# Patient Record
Sex: MALE | Race: WHITE | NOT HISPANIC OR LATINO | Employment: OTHER | URBAN - NONMETROPOLITAN AREA
[De-identification: names, ages, dates, MRNs, and addresses within clinical notes are randomized per-mention and may not be internally consistent; named-entity substitution may affect disease eponyms.]

---

## 2017-01-18 ENCOUNTER — CLINICAL SUPPORT (OUTPATIENT)
Dept: INTERNAL MEDICINE | Facility: CLINIC | Age: 57
End: 2017-01-18

## 2017-01-18 DIAGNOSIS — E29.1 HYPOGONADISM IN MALE: ICD-10-CM

## 2017-01-18 PROCEDURE — 96372 THER/PROPH/DIAG INJ SC/IM: CPT | Performed by: FAMILY MEDICINE

## 2017-01-18 RX ADMIN — TESTOSTERONE CYPIONATE 300 MG: 200 INJECTION, SOLUTION INTRAMUSCULAR at 11:53

## 2017-02-02 ENCOUNTER — APPOINTMENT (OUTPATIENT)
Dept: CT IMAGING | Facility: HOSPITAL | Age: 57
End: 2017-02-02

## 2017-02-02 ENCOUNTER — HOSPITAL ENCOUNTER (OUTPATIENT)
Facility: HOSPITAL | Age: 57
Setting detail: OBSERVATION
Discharge: HOME | End: 2017-02-03
Attending: INTERNAL MEDICINE | Admitting: INTERNAL MEDICINE

## 2017-02-02 ENCOUNTER — APPOINTMENT (OUTPATIENT)
Dept: GENERAL RADIOLOGY | Facility: HOSPITAL | Age: 57
End: 2017-02-02

## 2017-02-02 ENCOUNTER — OFFICE VISIT (OUTPATIENT)
Dept: INTERNAL MEDICINE | Facility: CLINIC | Age: 57
End: 2017-02-02

## 2017-02-02 VITALS
SYSTOLIC BLOOD PRESSURE: 124 MMHG | TEMPERATURE: 98.4 F | HEIGHT: 70 IN | BODY MASS INDEX: 32.78 KG/M2 | HEART RATE: 87 BPM | OXYGEN SATURATION: 89 % | DIASTOLIC BLOOD PRESSURE: 80 MMHG | RESPIRATION RATE: 12 BRPM | WEIGHT: 229 LBS

## 2017-02-02 DIAGNOSIS — R09.02 HYPOXIA: ICD-10-CM

## 2017-02-02 DIAGNOSIS — R06.03 RESPIRATORY DISTRESS: Primary | ICD-10-CM

## 2017-02-02 DIAGNOSIS — J20.8 ACUTE BRONCHITIS DUE TO OTHER SPECIFIED ORGANISMS: ICD-10-CM

## 2017-02-02 DIAGNOSIS — R06.02 SHORTNESS OF BREATH: ICD-10-CM

## 2017-02-02 DIAGNOSIS — J41.1 BRONCHITIS, MUCOPURULENT RECURRENT (HCC): ICD-10-CM

## 2017-02-02 PROBLEM — R06.00 DYSPNEA: Status: ACTIVE | Noted: 2017-02-02

## 2017-02-02 PROBLEM — I16.0 HYPERTENSIVE URGENCY: Status: ACTIVE | Noted: 2017-02-02

## 2017-02-02 PROBLEM — R51.9 HEADACHE: Status: ACTIVE | Noted: 2017-02-02

## 2017-02-02 LAB
ALBUMIN SERPL-MCNC: 4.4 G/DL (ref 3.5–5)
ALBUMIN/GLOB SERPL: 1.4 G/DL (ref 1–2)
ALP SERPL-CCNC: 69 U/L (ref 38–126)
ALT SERPL W P-5'-P-CCNC: 54 U/L (ref 13–69)
ANION GAP SERPL CALCULATED.3IONS-SCNC: 18.8 MMOL/L
ARTERIAL PATENCY WRIST A: POSITIVE
AST SERPL-CCNC: 28 U/L (ref 15–46)
ATMOSPHERIC PRESS: 741 MMHG
BASE EXCESS BLDA CALC-SCNC: 1.7 MMOL/L
BASOPHILS # BLD AUTO: 0.04 10*3/MM3 (ref 0–0.2)
BASOPHILS NFR BLD AUTO: 0.5 % (ref 0–2.5)
BDY SITE: ABNORMAL
BILIRUB SERPL-MCNC: 0.6 MG/DL (ref 0.2–1.3)
BUN BLD-MCNC: 19 MG/DL (ref 7–20)
BUN/CREAT SERPL: 27.1 (ref 6.3–21.9)
CALCIUM SPEC-SCNC: 9.4 MG/DL (ref 8.4–10.2)
CHLORIDE SERPL-SCNC: 103 MMOL/L (ref 98–107)
CO2 SERPL-SCNC: 25 MMOL/L (ref 26–30)
CREAT BLD-MCNC: 0.7 MG/DL (ref 0.6–1.3)
D-LACTATE SERPL-SCNC: 1.3 MMOL/L (ref 0.5–2)
DEPRECATED RDW RBC AUTO: 47.3 FL (ref 37–54)
EOSINOPHIL # BLD AUTO: 0.37 10*3/MM3 (ref 0–0.7)
EOSINOPHIL NFR BLD AUTO: 4.8 % (ref 0–7)
ERYTHROCYTE [DISTWIDTH] IN BLOOD BY AUTOMATED COUNT: 14 % (ref 11.5–14.5)
GFR SERPL CREATININE-BSD FRML MDRD: 118 ML/MIN/1.73
GLOBULIN UR ELPH-MCNC: 3.2 GM/DL
GLUCOSE BLD-MCNC: 185 MG/DL (ref 74–98)
HCO3 BLDA-SCNC: 25.9 MMOL/L (ref 22–28)
HCT VFR BLD AUTO: 51.7 % (ref 42–52)
HGB BLD-MCNC: 17.7 G/DL (ref 14–18)
HGB BLDA-MCNC: 18.1 G/DL (ref 12–18)
HOROWITZ INDEX BLD+IHG-RTO: 28 %
IMM GRANULOCYTES # BLD: 0.02 10*3/MM3 (ref 0–0.06)
IMM GRANULOCYTES NFR BLD: 0.3 % (ref 0–0.6)
LYMPHOCYTES # BLD AUTO: 1.55 10*3/MM3 (ref 0.6–3.4)
LYMPHOCYTES NFR BLD AUTO: 20 % (ref 10–50)
MCH RBC QN AUTO: 31.7 PG (ref 27–31)
MCHC RBC AUTO-ENTMCNC: 34.2 G/DL (ref 30–37)
MCV RBC AUTO: 92.7 FL (ref 80–94)
MODALITY: ABNORMAL
MONOCYTES # BLD AUTO: 0.91 10*3/MM3 (ref 0–0.9)
MONOCYTES NFR BLD AUTO: 11.8 % (ref 0–12)
NEUTROPHILS # BLD AUTO: 4.85 10*3/MM3 (ref 2–6.9)
NEUTROPHILS NFR BLD AUTO: 62.6 % (ref 37–80)
NRBC BLD MANUAL-RTO: 0 /100 WBC (ref 0–0)
PCO2 BLDA: 38.5 MM HG (ref 35–45)
PH BLDA: 7.44 PH UNITS
PLAT MORPH BLD: NORMAL
PLATELET # BLD AUTO: 183 10*3/MM3 (ref 130–400)
PMV BLD AUTO: 10.5 FL (ref 6–12)
PO2 BLDA: 66.8 MM HG (ref 75–100)
POTASSIUM BLD-SCNC: 3.8 MMOL/L (ref 3.5–5.1)
PROT SERPL-MCNC: 7.6 G/DL (ref 6.3–8.2)
RBC # BLD AUTO: 5.58 10*6/MM3 (ref 4.7–6.1)
RBC MORPH BLD: NORMAL
SAO2 % BLDCOA: 95.3 %
SODIUM BLD-SCNC: 143 MMOL/L (ref 137–145)
TROPONIN I SERPL-MCNC: <0.012 NG/ML (ref 0–0.03)
WBC MORPH BLD: NORMAL
WBC NRBC COR # BLD: 7.74 10*3/MM3 (ref 4.8–10.8)

## 2017-02-02 PROCEDURE — G0378 HOSPITAL OBSERVATION PER HR: HCPCS

## 2017-02-02 PROCEDURE — 71020 HC CHEST PA AND LATERAL: CPT

## 2017-02-02 PROCEDURE — 96360 HYDRATION IV INFUSION INIT: CPT

## 2017-02-02 PROCEDURE — 99220 PR INITIAL OBSERVATION CARE/DAY 70 MINUTES: CPT | Performed by: INTERNAL MEDICINE

## 2017-02-02 PROCEDURE — 25010000002 CEFTRIAXONE: Performed by: INTERNAL MEDICINE

## 2017-02-02 PROCEDURE — 36600 WITHDRAWAL OF ARTERIAL BLOOD: CPT

## 2017-02-02 PROCEDURE — 87040 BLOOD CULTURE FOR BACTERIA: CPT | Performed by: INTERNAL MEDICINE

## 2017-02-02 PROCEDURE — 85025 COMPLETE CBC W/AUTO DIFF WBC: CPT | Performed by: INTERNAL MEDICINE

## 2017-02-02 PROCEDURE — 94640 AIRWAY INHALATION TREATMENT: CPT | Performed by: FAMILY MEDICINE

## 2017-02-02 PROCEDURE — 94640 AIRWAY INHALATION TREATMENT: CPT

## 2017-02-02 PROCEDURE — 96361 HYDRATE IV INFUSION ADD-ON: CPT

## 2017-02-02 PROCEDURE — 96372 THER/PROPH/DIAG INJ SC/IM: CPT

## 2017-02-02 PROCEDURE — 84484 ASSAY OF TROPONIN QUANT: CPT | Performed by: INTERNAL MEDICINE

## 2017-02-02 PROCEDURE — 82805 BLOOD GASES W/O2 SATURATION: CPT

## 2017-02-02 PROCEDURE — 80053 COMPREHEN METABOLIC PANEL: CPT | Performed by: INTERNAL MEDICINE

## 2017-02-02 PROCEDURE — 70470 CT HEAD/BRAIN W/O & W/DYE: CPT

## 2017-02-02 PROCEDURE — 83605 ASSAY OF LACTIC ACID: CPT | Performed by: INTERNAL MEDICINE

## 2017-02-02 PROCEDURE — 25010000002 ENOXAPARIN PER 10 MG: Performed by: INTERNAL MEDICINE

## 2017-02-02 PROCEDURE — 25010000002 LEVOFLOXACIN PER 250 MG: Performed by: INTERNAL MEDICINE

## 2017-02-02 PROCEDURE — 0 IOPAMIDOL 61 % SOLUTION: Performed by: INTERNAL MEDICINE

## 2017-02-02 PROCEDURE — 85007 BL SMEAR W/DIFF WBC COUNT: CPT | Performed by: INTERNAL MEDICINE

## 2017-02-02 PROCEDURE — 99213 OFFICE O/P EST LOW 20 MIN: CPT | Performed by: FAMILY MEDICINE

## 2017-02-02 RX ORDER — BUPROPION HYDROCHLORIDE 150 MG/1
150 TABLET, EXTENDED RELEASE ORAL 3 TIMES DAILY
Status: DISCONTINUED | OUTPATIENT
Start: 2017-02-02 | End: 2017-02-03 | Stop reason: HOSPADM

## 2017-02-02 RX ORDER — HYDROCHLOROTHIAZIDE 12.5 MG/1
12.5 CAPSULE, GELATIN COATED ORAL DAILY
Status: DISCONTINUED | OUTPATIENT
Start: 2017-02-02 | End: 2017-02-03 | Stop reason: HOSPADM

## 2017-02-02 RX ORDER — IPRATROPIUM BROMIDE AND ALBUTEROL SULFATE 2.5; .5 MG/3ML; MG/3ML
3 SOLUTION RESPIRATORY (INHALATION)
Status: DISCONTINUED | OUTPATIENT
Start: 2017-02-02 | End: 2017-02-03 | Stop reason: HOSPADM

## 2017-02-02 RX ORDER — BRIMONIDINE TARTRATE AND TIMOLOL MALEATE 2; 5 MG/ML; MG/ML
2 SOLUTION OPHTHALMIC DAILY
Status: DISCONTINUED | OUTPATIENT
Start: 2017-02-02 | End: 2017-02-03 | Stop reason: HOSPADM

## 2017-02-02 RX ORDER — ACETAMINOPHEN 325 MG/1
650 TABLET ORAL EVERY 6 HOURS PRN
Status: DISCONTINUED | OUTPATIENT
Start: 2017-02-02 | End: 2017-02-03 | Stop reason: HOSPADM

## 2017-02-02 RX ORDER — ALBUTEROL SULFATE 2.5 MG/3ML
2.5 SOLUTION RESPIRATORY (INHALATION) EVERY 6 HOURS PRN
Status: DISCONTINUED | OUTPATIENT
Start: 2017-02-02 | End: 2017-02-03 | Stop reason: HOSPADM

## 2017-02-02 RX ORDER — BENZONATATE 100 MG/1
200 CAPSULE ORAL 3 TIMES DAILY PRN
Status: DISCONTINUED | OUTPATIENT
Start: 2017-02-02 | End: 2017-02-03 | Stop reason: HOSPADM

## 2017-02-02 RX ORDER — GUAIFENESIN 600 MG/1
600 TABLET, EXTENDED RELEASE ORAL 2 TIMES DAILY
Status: DISCONTINUED | OUTPATIENT
Start: 2017-02-02 | End: 2017-02-03 | Stop reason: HOSPADM

## 2017-02-02 RX ORDER — ALBUTEROL SULFATE 0.63 MG/3ML
0.63 SOLUTION RESPIRATORY (INHALATION) ONCE
Status: DISCONTINUED | OUTPATIENT
Start: 2017-02-02 | End: 2017-02-02

## 2017-02-02 RX ORDER — ALBUTEROL SULFATE 2.5 MG/3ML
2.5 SOLUTION RESPIRATORY (INHALATION) ONCE
Status: DISCONTINUED | OUTPATIENT
Start: 2017-02-02 | End: 2017-02-03 | Stop reason: HOSPADM

## 2017-02-02 RX ORDER — SODIUM CHLORIDE 9 MG/ML
75 INJECTION, SOLUTION INTRAVENOUS CONTINUOUS
Status: DISCONTINUED | OUTPATIENT
Start: 2017-02-02 | End: 2017-02-03 | Stop reason: HOSPADM

## 2017-02-02 RX ORDER — HYDRALAZINE HYDROCHLORIDE 10 MG/1
10 TABLET, FILM COATED ORAL EVERY 6 HOURS PRN
Status: DISCONTINUED | OUTPATIENT
Start: 2017-02-02 | End: 2017-02-03 | Stop reason: HOSPADM

## 2017-02-02 RX ORDER — LEVOFLOXACIN 5 MG/ML
500 INJECTION, SOLUTION INTRAVENOUS EVERY 24 HOURS
Status: DISCONTINUED | OUTPATIENT
Start: 2017-02-02 | End: 2017-02-03 | Stop reason: HOSPADM

## 2017-02-02 RX ORDER — DIAZEPAM 5 MG/1
5 TABLET ORAL EVERY 12 HOURS PRN
Status: DISCONTINUED | OUTPATIENT
Start: 2017-02-02 | End: 2017-02-03 | Stop reason: HOSPADM

## 2017-02-02 RX ORDER — SODIUM CHLORIDE 0.9 % (FLUSH) 0.9 %
1-10 SYRINGE (ML) INJECTION AS NEEDED
Status: DISCONTINUED | OUTPATIENT
Start: 2017-02-02 | End: 2017-02-03 | Stop reason: HOSPADM

## 2017-02-02 RX ORDER — AMLODIPINE BESYLATE AND BENAZEPRIL HYDROCHLORIDE 5; 10 MG/1; MG/1
2 CAPSULE ORAL DAILY
Status: DISCONTINUED | OUTPATIENT
Start: 2017-02-02 | End: 2017-02-03 | Stop reason: HOSPADM

## 2017-02-02 RX ORDER — ALBUTEROL SULFATE 2.5 MG/3ML
2.5 SOLUTION RESPIRATORY (INHALATION) ONCE
Status: DISCONTINUED | OUTPATIENT
Start: 2017-02-02 | End: 2017-02-02

## 2017-02-02 RX ORDER — ALBUTEROL SULFATE 90 UG/1
2 AEROSOL, METERED RESPIRATORY (INHALATION) EVERY 6 HOURS PRN
Status: DISCONTINUED | OUTPATIENT
Start: 2017-02-02 | End: 2017-02-02 | Stop reason: CLARIF

## 2017-02-02 RX ADMIN — SODIUM CHLORIDE 75 ML/HR: 9 INJECTION, SOLUTION INTRAVENOUS at 19:00

## 2017-02-02 RX ADMIN — SODIUM CHLORIDE 75 ML/HR: 9 INJECTION, SOLUTION INTRAVENOUS at 18:59

## 2017-02-02 RX ADMIN — SODIUM CHLORIDE 75 ML/HR: 9 INJECTION, SOLUTION INTRAVENOUS at 18:51

## 2017-02-02 RX ADMIN — IPRATROPIUM BROMIDE AND ALBUTEROL SULFATE 3 ML: .5; 3 SOLUTION RESPIRATORY (INHALATION) at 19:24

## 2017-02-02 RX ADMIN — IOPAMIDOL 100 ML: 612 INJECTION, SOLUTION INTRAVENOUS at 22:00

## 2017-02-02 RX ADMIN — CEFTRIAXONE 1 G: 1 INJECTION, POWDER, FOR SOLUTION INTRAMUSCULAR; INTRAVENOUS at 19:38

## 2017-02-02 RX ADMIN — ALBUTEROL SULFATE 2.5 MG: 2.5 SOLUTION RESPIRATORY (INHALATION) at 15:28

## 2017-02-02 RX ADMIN — AMLODIPINE BESYLATE AND BENAZEPRIL HYDROCHLORIDE 2 CAPSULE: 5; 10 CAPSULE ORAL at 18:58

## 2017-02-02 RX ADMIN — DIAZEPAM 5 MG: 5 TABLET ORAL at 18:58

## 2017-02-02 RX ADMIN — BUPROPION HYDROCHLORIDE 150 MG: 150 TABLET, FILM COATED, EXTENDED RELEASE ORAL at 20:00

## 2017-02-02 RX ADMIN — ACETAMINOPHEN 650 MG: 325 TABLET, FILM COATED ORAL at 19:38

## 2017-02-02 RX ADMIN — ENOXAPARIN SODIUM 40 MG: 40 INJECTION SUBCUTANEOUS at 18:56

## 2017-02-02 RX ADMIN — HYDROCHLOROTHIAZIDE 12.5 MG: 12.5 CAPSULE ORAL at 18:58

## 2017-02-02 RX ADMIN — LEVOFLOXACIN 500 MG: 5 INJECTION, SOLUTION INTRAVENOUS at 18:59

## 2017-02-02 RX ADMIN — BRIMONIDINE TARTRATE, TIMOLOL MALEATE 2 DROP: 2; 5 SOLUTION/ DROPS OPHTHALMIC at 18:57

## 2017-02-02 RX ADMIN — HYDRALAZINE HYDROCHLORIDE 10 MG: 10 TABLET, FILM COATED ORAL at 18:58

## 2017-02-02 RX ADMIN — GUAIFENESIN 600 MG: 600 TABLET, EXTENDED RELEASE ORAL at 18:58

## 2017-02-02 NOTE — PROGRESS NOTES
SUBJECTIVE: Demi Meyer is a 56 y.o. male seen for a follow up visit;        Frequent bronchitis: had improved for 2 weeks after the 2 week course of doxycycline.  Then started getting sinus infection symptoms, + coughing, became SOB 2 days ago - albuterol not helping.  Started feeling dizzy, lightheaded though on Sunday.      C/O headaches since November, pretty much every day. Takes ASA, Tylenol, ibuprofen alt.         The following portions of the patient's history were reviewed and updated as appropriate: He  has a past medical history of Arthritis; Diverticulitis; Diverticulosis; Gastric ulcer; Hypertension; Renal calculi; and Stroke.  He has Essential hypertension and Hypogonadism in male on his pertinent problem list.  He  has a past surgical history that includes Appendectomy; Eye surgery; Lumbar fusion (1999); Tonsillectomy; Hernia repair (2011); Back surgery; and Abdominal surgery.  His family history includes Alcohol abuse in his brother and maternal grandfather; Asthma in his sister; COPD in his maternal grandfather; Cancer in his brother, maternal grandfather, mother, and paternal grandmother; Diabetes in his father and mother; Esophageal cancer in his paternal grandmother; Heart attack in his father; Hypertension in his father, maternal grandfather, and mother; Irritable bowel syndrome in his father and sister; Lung cancer in his maternal grandfather; No Known Problems in his brother, maternal grandmother, maternal uncle, maternal uncle, and paternal aunt; Prostate cancer (age of onset: 61) in his brother; Stomach cancer in his maternal aunt.  He  reports that he has quit smoking. His smoking use included Cigarettes. He has a 5.00 pack-year smoking history. He has quit using smokeless tobacco. His smokeless tobacco use included Chew. He reports that he drinks about 0.6 oz of alcohol per week  He reports that he does not use illicit drugs..    Review of Systems   Constitutional: Positive for  "chills and diaphoresis. Negative for fever.   Respiratory: Positive for cough, chest tightness and shortness of breath.    Cardiovascular: Negative for chest pain and leg swelling.         OBJECTIVE:  Visit Vitals   • /80   • Pulse 87   • Temp 98.4 °F (36.9 °C) (Temporal Artery )   • Resp 12   • Ht 70\" (177.8 cm)   • Wt 229 lb (104 kg)   • SpO2 (!) 89%   • BMI 32.86 kg/m2      91% on 2 L O2 after albuterol neb    Physical Exam   Constitutional: He appears ill.   Cardiovascular: Normal rate and regular rhythm.    Pulmonary/Chest: Tachypnea noted. He has decreased breath sounds. He has wheezes.           ASSESSMENT:  1. Respiratory distress  Unstable - had to place on 2 L O2 and still only sating 91% at rest.    Discussed with on call hospitalist, pt to be admitted for hypoxia, suspected pneumonia    2. Shortness of breath  Albuterol nebs given in office w/ minimal improvement    3. Hypoxia  Currently on 2L 02 and only up to 91% - needs admission to hospital    4. Bronchitis, mucopurulent recurrent  Needs workup for recurrent bronchitis.                I, Angelita Lu MD, hereby attest that the medical record entry above accurately reflects signatures/notations that I made in my capacity as Angelita Lu MD when I treated and diagnosed the above patient.  I do hereby attest that his information is true, accurate, and complete to the best of my knowledge and I understand that any falsification, omission, or concealment of material fact may subject me to administrative, civil, or criminal liability.              "
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
## 2017-02-03 ENCOUNTER — APPOINTMENT (OUTPATIENT)
Dept: CARDIOLOGY | Facility: HOSPITAL | Age: 57
End: 2017-02-03
Attending: INTERNAL MEDICINE

## 2017-02-03 VITALS
HEIGHT: 70 IN | RESPIRATION RATE: 18 BRPM | WEIGHT: 227.6 LBS | DIASTOLIC BLOOD PRESSURE: 89 MMHG | SYSTOLIC BLOOD PRESSURE: 125 MMHG | OXYGEN SATURATION: 95 % | TEMPERATURE: 97.4 F | HEART RATE: 61 BPM | BODY MASS INDEX: 32.58 KG/M2

## 2017-02-03 LAB
ANION GAP SERPL CALCULATED.3IONS-SCNC: 14.5 MMOL/L
BACTERIA UR QL AUTO: ABNORMAL /HPF
BASOPHILS # BLD AUTO: 0.06 10*3/MM3 (ref 0–0.2)
BASOPHILS NFR BLD AUTO: 0.8 % (ref 0–2.5)
BH CV ECHO MEAS - % IVS THICK: 30.8 %
BH CV ECHO MEAS - % LVPW THICK: 80 %
BH CV ECHO MEAS - AO ACC SLOPE: 1372 CM/SEC^2
BH CV ECHO MEAS - AO ACC TIME: 0.1 SEC
BH CV ECHO MEAS - AO ROOT AREA (BSA CORRECTED): 1.5
BH CV ECHO MEAS - AO ROOT AREA: 8.6 CM^2
BH CV ECHO MEAS - AO ROOT DIAM: 3.3 CM
BH CV ECHO MEAS - BSA(HAYCOCK): 2.3 M^2
BH CV ECHO MEAS - BSA: 2.2 M^2
BH CV ECHO MEAS - BZI_BMI: 32.6 KILOGRAMS/M^2
BH CV ECHO MEAS - BZI_METRIC_HEIGHT: 177.8 CM
BH CV ECHO MEAS - BZI_METRIC_WEIGHT: 103 KG
BH CV ECHO MEAS - EDV(CUBED): 179.8 ML
BH CV ECHO MEAS - EDV(TEICH): 156.4 ML
BH CV ECHO MEAS - EF(CUBED): 75.8 %
BH CV ECHO MEAS - EF(TEICH): 67.1 %
BH CV ECHO MEAS - ESV(CUBED): 43.5 ML
BH CV ECHO MEAS - ESV(TEICH): 51.4 ML
BH CV ECHO MEAS - FS: 37.7 %
BH CV ECHO MEAS - IVS/LVPW: 1.3
BH CV ECHO MEAS - IVSD: 1.1 CM
BH CV ECHO MEAS - IVSS: 1.5 CM
BH CV ECHO MEAS - LA DIMENSION: 3.6 CM
BH CV ECHO MEAS - LA/AO: 1.1
BH CV ECHO MEAS - LV MASS(C)D: 222.2 GRAMS
BH CV ECHO MEAS - LV MASS(C)DI: 100.9 GRAMS/M^2
BH CV ECHO MEAS - LV MASS(C)S: 198.8 GRAMS
BH CV ECHO MEAS - LV MASS(C)SI: 90.2 GRAMS/M^2
BH CV ECHO MEAS - LV MAX PG: 7.1 MMHG
BH CV ECHO MEAS - LV MEAN PG: 3.3 MMHG
BH CV ECHO MEAS - LV V1 MAX: 133.4 CM/SEC
BH CV ECHO MEAS - LV V1 MEAN: 83.6 CM/SEC
BH CV ECHO MEAS - LV V1 VTI: 25.6 CM
BH CV ECHO MEAS - LVIDD: 5.6 CM
BH CV ECHO MEAS - LVIDS: 3.5 CM
BH CV ECHO MEAS - LVOT AREA (M): 4.2 CM^2
BH CV ECHO MEAS - LVOT AREA: 4 CM^2
BH CV ECHO MEAS - LVOT DIAM: 2.3 CM
BH CV ECHO MEAS - LVPWD: 0.87 CM
BH CV ECHO MEAS - LVPWS: 1.6 CM
BH CV ECHO MEAS - MV A MAX VEL: 85.9 CM/SEC
BH CV ECHO MEAS - MV E MAX VEL: 96.3 CM/SEC
BH CV ECHO MEAS - MV E/A: 1.1
BH CV ECHO MEAS - MV MAX PG: 3.4 MMHG
BH CV ECHO MEAS - MV MEAN PG: 1.2 MMHG
BH CV ECHO MEAS - MV V2 MAX: 92.8 CM/SEC
BH CV ECHO MEAS - MV V2 MEAN: 47.8 CM/SEC
BH CV ECHO MEAS - MV V2 VTI: 29.6 CM
BH CV ECHO MEAS - MVA(VTI): 3.5 CM^2
BH CV ECHO MEAS - PA ACC SLOPE: 1928 CM/SEC^2
BH CV ECHO MEAS - PA ACC TIME: 0.04 SEC
BH CV ECHO MEAS - PA MAX PG: 2.6 MMHG
BH CV ECHO MEAS - PA MEAN PG: 1.7 MMHG
BH CV ECHO MEAS - PA PR(ACCEL): 60 MMHG
BH CV ECHO MEAS - PA V2 MAX: 80.9 CM/SEC
BH CV ECHO MEAS - PA V2 MEAN: 61.4 CM/SEC
BH CV ECHO MEAS - PA V2 VTI: 16.2 CM
BH CV ECHO MEAS - RVSP: 28 MMHG
BH CV ECHO MEAS - SI(CUBED): 61.9 ML/M^2
BH CV ECHO MEAS - SI(LVOT): 46.6 ML/M^2
BH CV ECHO MEAS - SI(TEICH): 47.7 ML/M^2
BH CV ECHO MEAS - SV(CUBED): 136.3 ML
BH CV ECHO MEAS - SV(LVOT): 102.6 ML
BH CV ECHO MEAS - SV(TEICH): 105 ML
BH CV ECHO MEAS - TR MAX VEL: 189.4 CM/SEC
BILIRUB UR QL STRIP: NEGATIVE
BUN BLD-MCNC: 15 MG/DL (ref 7–20)
BUN/CREAT SERPL: 21.4 (ref 6.3–21.9)
CALCIUM SPEC-SCNC: 8.9 MG/DL (ref 8.4–10.2)
CHLORIDE SERPL-SCNC: 104 MMOL/L (ref 98–107)
CHOLEST SERPL-MCNC: 163 MG/DL (ref 0–199)
CLARITY UR: CLEAR
CO2 SERPL-SCNC: 28 MMOL/L (ref 26–30)
COLOR UR: YELLOW
CREAT BLD-MCNC: 0.7 MG/DL (ref 0.6–1.3)
DEPRECATED RDW RBC AUTO: 47.3 FL (ref 37–54)
EOSINOPHIL # BLD AUTO: 0.36 10*3/MM3 (ref 0–0.7)
EOSINOPHIL NFR BLD AUTO: 4.9 % (ref 0–7)
ERYTHROCYTE [DISTWIDTH] IN BLOOD BY AUTOMATED COUNT: 14.1 % (ref 11.5–14.5)
GFR SERPL CREATININE-BSD FRML MDRD: 118 ML/MIN/1.73
GLUCOSE BLD-MCNC: 136 MG/DL (ref 74–98)
GLUCOSE UR STRIP-MCNC: ABNORMAL MG/DL
HCT VFR BLD AUTO: 49.2 % (ref 42–52)
HDLC SERPL-MCNC: 27 MG/DL (ref 40–60)
HGB BLD-MCNC: 16.8 G/DL (ref 14–18)
HGB UR QL STRIP.AUTO: ABNORMAL
HYALINE CASTS UR QL AUTO: ABNORMAL /LPF
IMM GRANULOCYTES # BLD: 0.04 10*3/MM3 (ref 0–0.06)
IMM GRANULOCYTES NFR BLD: 0.5 % (ref 0–0.6)
KETONES UR QL STRIP: NEGATIVE
LDLC SERPL CALC-MCNC: 73 MG/DL (ref 0–99)
LDLC/HDLC SERPL: 2.71 {RATIO}
LEUKOCYTE ESTERASE UR QL STRIP.AUTO: NEGATIVE
LV EF 2D ECHO EST: 60 %
LYMPHOCYTES # BLD AUTO: 1.63 10*3/MM3 (ref 0.6–3.4)
LYMPHOCYTES NFR BLD AUTO: 22.1 % (ref 10–50)
MAGNESIUM SERPL-MCNC: 1.7 MG/DL (ref 1.6–2.3)
MCH RBC QN AUTO: 31.5 PG (ref 27–31)
MCHC RBC AUTO-ENTMCNC: 34.1 G/DL (ref 30–37)
MCV RBC AUTO: 92.3 FL (ref 80–94)
MONOCYTES # BLD AUTO: 1.07 10*3/MM3 (ref 0–0.9)
MONOCYTES NFR BLD AUTO: 14.5 % (ref 0–12)
NEUTROPHILS # BLD AUTO: 4.21 10*3/MM3 (ref 2–6.9)
NEUTROPHILS NFR BLD AUTO: 57.2 % (ref 37–80)
NITRITE UR QL STRIP: NEGATIVE
NRBC BLD MANUAL-RTO: 0 /100 WBC (ref 0–0)
PH UR STRIP.AUTO: 6.5 [PH] (ref 5–8)
PLATELET # BLD AUTO: 219 10*3/MM3 (ref 130–400)
PMV BLD AUTO: 10.4 FL (ref 6–12)
POTASSIUM BLD-SCNC: 3.5 MMOL/L (ref 3.5–5.1)
PROT UR QL STRIP: ABNORMAL
RBC # BLD AUTO: 5.33 10*6/MM3 (ref 4.7–6.1)
RBC # UR: ABNORMAL /HPF
REF LAB TEST METHOD: ABNORMAL
SODIUM BLD-SCNC: 143 MMOL/L (ref 137–145)
SP GR UR STRIP: 1.02 (ref 1–1.03)
SQUAMOUS #/AREA URNS HPF: ABNORMAL /HPF
TRIGL SERPL-MCNC: 314 MG/DL
TROPONIN I SERPL-MCNC: <0.012 NG/ML (ref 0–0.03)
TROPONIN I SERPL-MCNC: <0.012 NG/ML (ref 0–0.03)
UROBILINOGEN UR QL STRIP: ABNORMAL
VLDLC SERPL-MCNC: 62.8 MG/DL
WBC NRBC COR # BLD: 7.37 10*3/MM3 (ref 4.8–10.8)
WBC UR QL AUTO: ABNORMAL /HPF

## 2017-02-03 PROCEDURE — 93306 TTE W/DOPPLER COMPLETE: CPT | Performed by: INTERNAL MEDICINE

## 2017-02-03 PROCEDURE — 93306 TTE W/DOPPLER COMPLETE: CPT

## 2017-02-03 PROCEDURE — 94640 AIRWAY INHALATION TREATMENT: CPT

## 2017-02-03 PROCEDURE — G0378 HOSPITAL OBSERVATION PER HR: HCPCS

## 2017-02-03 PROCEDURE — 84484 ASSAY OF TROPONIN QUANT: CPT | Performed by: INTERNAL MEDICINE

## 2017-02-03 PROCEDURE — 81001 URINALYSIS AUTO W/SCOPE: CPT | Performed by: INTERNAL MEDICINE

## 2017-02-03 PROCEDURE — 80048 BASIC METABOLIC PNL TOTAL CA: CPT | Performed by: INTERNAL MEDICINE

## 2017-02-03 PROCEDURE — 80061 LIPID PANEL: CPT | Performed by: INTERNAL MEDICINE

## 2017-02-03 PROCEDURE — 99217 PR OBSERVATION CARE DISCHARGE MANAGEMENT: CPT | Performed by: INTERNAL MEDICINE

## 2017-02-03 PROCEDURE — 85025 COMPLETE CBC W/AUTO DIFF WBC: CPT | Performed by: INTERNAL MEDICINE

## 2017-02-03 PROCEDURE — 96361 HYDRATE IV INFUSION ADD-ON: CPT

## 2017-02-03 PROCEDURE — 83735 ASSAY OF MAGNESIUM: CPT | Performed by: INTERNAL MEDICINE

## 2017-02-03 RX ORDER — PREDNISONE 10 MG/1
10 TABLET ORAL DAILY
Qty: 30 TABLET | Refills: 0 | Status: SHIPPED | OUTPATIENT
Start: 2017-02-03 | End: 2017-04-26 | Stop reason: HOSPADM

## 2017-02-03 RX ORDER — NAPROXEN 250 MG/1
500 TABLET ORAL DAILY PRN
COMMUNITY
End: 2017-02-03 | Stop reason: HOSPADM

## 2017-02-03 RX ORDER — CETIRIZINE HYDROCHLORIDE 10 MG/1
10 TABLET ORAL DAILY
COMMUNITY

## 2017-02-03 RX ORDER — IBUPROFEN 200 MG
600 TABLET ORAL DAILY PRN
COMMUNITY
End: 2017-02-03 | Stop reason: HOSPADM

## 2017-02-03 RX ORDER — GUAIFENESIN 600 MG/1
600 TABLET, EXTENDED RELEASE ORAL 2 TIMES DAILY
Qty: 14 TABLET | Refills: 0 | Status: SHIPPED | OUTPATIENT
Start: 2017-02-03 | End: 2017-04-26 | Stop reason: HOSPADM

## 2017-02-03 RX ORDER — ASPIRIN 325 MG
650 TABLET ORAL DAILY PRN
COMMUNITY
End: 2017-05-01

## 2017-02-03 RX ORDER — LEVOFLOXACIN 500 MG/1
500 TABLET, FILM COATED ORAL DAILY
Qty: 14 TABLET | Refills: 0 | Status: SHIPPED | OUTPATIENT
Start: 2017-02-03 | End: 2017-03-24

## 2017-02-03 RX ORDER — ACETAMINOPHEN 500 MG
1000 TABLET ORAL DAILY PRN
COMMUNITY
End: 2017-02-03 | Stop reason: HOSPADM

## 2017-02-03 RX ORDER — BENZONATATE 200 MG/1
200 CAPSULE ORAL 3 TIMES DAILY PRN
Qty: 30 CAPSULE | Refills: 0 | Status: SHIPPED | OUTPATIENT
Start: 2017-02-03 | End: 2017-04-26 | Stop reason: HOSPADM

## 2017-02-03 RX ADMIN — BUPROPION HYDROCHLORIDE 150 MG: 150 TABLET, FILM COATED, EXTENDED RELEASE ORAL at 08:30

## 2017-02-03 RX ADMIN — BENZONATATE 200 MG: 100 CAPSULE ORAL at 08:30

## 2017-02-03 RX ADMIN — ACETAMINOPHEN 650 MG: 325 TABLET, FILM COATED ORAL at 03:27

## 2017-02-03 RX ADMIN — BRIMONIDINE TARTRATE, TIMOLOL MALEATE 2 DROP: 2; 5 SOLUTION/ DROPS OPHTHALMIC at 08:33

## 2017-02-03 RX ADMIN — IPRATROPIUM BROMIDE AND ALBUTEROL SULFATE 3 ML: .5; 3 SOLUTION RESPIRATORY (INHALATION) at 07:16

## 2017-02-03 RX ADMIN — DIAZEPAM 5 MG: 5 TABLET ORAL at 08:30

## 2017-02-03 RX ADMIN — HYDROCHLOROTHIAZIDE 12.5 MG: 12.5 CAPSULE ORAL at 08:30

## 2017-02-03 RX ADMIN — GUAIFENESIN 600 MG: 600 TABLET, EXTENDED RELEASE ORAL at 08:30

## 2017-02-03 RX ADMIN — AMLODIPINE BESYLATE AND BENAZEPRIL HYDROCHLORIDE 2 CAPSULE: 5; 10 CAPSULE ORAL at 08:31

## 2017-02-03 RX ADMIN — SODIUM CHLORIDE 75 ML/HR: 9 INJECTION, SOLUTION INTRAVENOUS at 08:28

## 2017-02-03 RX ADMIN — IPRATROPIUM BROMIDE AND ALBUTEROL SULFATE 3 ML: .5; 3 SOLUTION RESPIRATORY (INHALATION) at 12:22

## 2017-02-03 NOTE — PLAN OF CARE
Problem: Patient Care Overview (Adult)  Goal: Plan of Care Review  Outcome: Ongoing (interventions implemented as appropriate)    02/03/17 1331   Coping/Psychosocial Response Interventions   Plan Of Care Reviewed With patient;spouse   Patient Care Overview   Progress improving   Outcome Evaluation   Outcome Summary/Follow up Plan preparing for dc, no 02 required         Problem: COPD, Chronic Bronchitis/Emphysema (Adult)  Goal: Signs and Symptoms of Listed Potential Problems Will be Absent or Manageable (COPD, Chronic Bronchitis/Emphysema)  Outcome: Ongoing (interventions implemented as appropriate)    Problem: Pain, Chronic (Adult)  Goal: Identify Related Risk Factors and Signs and Symptoms  Outcome: Ongoing (interventions implemented as appropriate)  Goal: Acceptable Pain Control/Comfort Level  Outcome: Ongoing (interventions implemented as appropriate)

## 2017-02-03 NOTE — PROGRESS NOTES
94Exercise Oximetry    Patient Name:Demi Meyer   MRN: [id number]   Date: 02/03/17             ROOM AIR BASELINE   SpO2% 94   HR  71   Blood Pressure      EXERCISE ON ROOM AIR SpO2% EXERCISE ON O2 @ LPM SpO2%   1 MINUTE 93 1 MINUTE    2 MINUTES 94 2 MINUTES    3 MINUTES 95 3 MINUTES    4 MINUTES 95 4 MINUTES    5 MINUTES 95 5 MINUTES    6 MINUTES 95 6 MINUTES               Distance Walked  790 Distance Walked   Dyspnea (Patricia Scale)  1 Dyspnea (Patricia Scale)   Fatigue (Patricia Scale)  1 Fatigue (Patricia Scale)   SpO2% Post Exercise  96 SpO2% Post Exercise   HR Post Exercise  62 HR Post Exercise   Time to Recovery  1 min Time to Recovery     Comments: Pt walked with ease and did not require O2 to maintain a SaO2 greater than 88%.

## 2017-02-03 NOTE — PLAN OF CARE
Problem: Patient Care Overview (Adult)  Goal: Plan of Care Review  Outcome: Ongoing (interventions implemented as appropriate)    02/02/17 1940   Coping/Psychosocial Response Interventions   Plan Of Care Reviewed With patient;spouse   Patient Care Overview   Progress no change   Outcome Evaluation   Outcome Summary/Follow up Plan new admission, 02 at 3L via NC applied, tylenol for pain       Goal: Adult Individualization and Mutuality  Outcome: Ongoing (interventions implemented as appropriate)  Goal: Discharge Needs Assessment  Outcome: Ongoing (interventions implemented as appropriate)    Problem: COPD, Chronic Bronchitis/Emphysema (Adult)  Goal: Signs and Symptoms of Listed Potential Problems Will be Absent or Manageable (COPD, Chronic Bronchitis/Emphysema)  Outcome: Ongoing (interventions implemented as appropriate)

## 2017-02-03 NOTE — DISCHARGE SUMMARY
[facility]   DISCHARGE SUMMARY      Name:  Demi Meyer   Age:  56 y.o.  Sex:  male  :  1960  MRN:  [id number]   Visit Number:  [id number]  Primary Care Physician:  Angelita Lu MD  Date of Discharge:  2/3/2017  Admission Date:  2017      Discharge Diagnosis:     1. Intractable frontal headache likely due to sinusitis  2.  Acute bronchitis  3. Hypertensive urgency, resolved  4. Glaucoma  5. Depression  6. Chronic low back pain  7.  Acute on chronic sinusitis  Active Hospital Problems (** Indicates Principal Problem)    Diagnosis Date Noted   • Dyspnea [R06.00] 2017   • Headache [R51] 2017   • Hypertensive urgency [I16.0] 2017      Resolved Hospital Problems    Diagnosis Date Noted Date Resolved   No resolved problems to display.         Presenting Problem/History of Present Illness:    Dyspnea [R06.00]         Hospital Course:    Patient is a 56-year-old  male sent over from Dr. Lu office due to the fact that he was having dyspnea on exertion. He had originally presented there is a had been having recurrent frontal headaches. He states there are 7-8 out of 10 in intensity. He denies any blurred vision or lateralizing signs or weakness. Nothing makes it better or worse. He's been having these since November. He has also had several bouts of bronchitis in the past 3-4 months. He has been on multiple antibiotics most recently in December he was on doxycycline. He has also been on several rounds of steroids. He has had a cough and runny nose for the past 3 days. Cough is with brownish phlegm. He denies any fever or chills. He had some nausea and vomiting this morning. He has not had any imaging for his headaches. He denies any neck pain or nuchal rigidity. Wife states that he has not been confused. He is sent over for admission and further workup.     Patient was admitted.  Workup was done including CT of the brain which just showed acute on  chronic sinusitis.  Chest x-ray showed acute bronchitis.  Patient states he was improved overnight on antibiotics and breathing treatments.  It appears he may have an asthmatic component to his symptoms.  Would recommend follow-up with pulmonary services for asthma evaluation.  Today he denies any chest pressure, shortness breath, nausea, vomiting, or pain.  He does have a cough.  6 minute walk was done, he did not drop his O2 sat below 90.  His vital signs are all stable as are his labs.  He is stable to be discharged home.    Procedures Performed:           Consults:     Consults     No orders found for last 30 day(s).          Pertinent Test Results:     Lab Results (all)     Procedure Component Value Units Date/Time    Blood Gas, Arterial [[id number]]  (Abnormal) Collected:  02/02/17 1753    Specimen:  Arterial Blood Updated:  02/02/17 1753     Site Arterial: right radial      Russell's Test Positive      pH, Arterial 7.436 pH units      pCO2, Arterial 38.5 mm Hg      pO2, Arterial 66.8 (L) mm Hg      HCO3, Arterial 25.9 mmol/L      Base Excess, Arterial 1.7 mmol/L      O2 Saturation, Arterial 95.3 %      Hemoglobin, Blood Gas 18.1 (H) g/dL      Barometric Pressure for Blood Gas 741 mmHg      Modality Cannula - Nasal      FIO2 28 %     CBC Auto Differential [[id number]]  (Abnormal) Collected:  02/02/17 1831    Specimen:  Blood Updated:  02/02/17 1902     WBC 7.74 10*3/mm3      RBC 5.58 10*6/mm3      Hemoglobin 17.7 g/dL      Hematocrit 51.7 %      MCV 92.7 fL      MCH 31.7 (H) pg      MCHC 34.2 g/dL      RDW 14.0 %      RDW-SD 47.3 fl      MPV 10.5 fL      Platelets 183 10*3/mm3      Neutrophil % 62.6 %      Lymphocyte % 20.0 %      Monocyte % 11.8 %      Eosinophil % 4.8 %      Basophil % 0.5 %      Immature Grans % 0.3 %      Neutrophils, Absolute 4.85 10*3/mm3      Lymphocytes, Absolute 1.55 10*3/mm3      Monocytes, Absolute 0.91 (H) 10*3/mm3      Eosinophils, Absolute 0.37 10*3/mm3      Basophils, Absolute 0.04  10*3/mm3      Immature Grans, Absolute 0.02 10*3/mm3      nRBC 0.0 /100 WBC     CBC & Differential [[id number]] Collected:  02/02/17 1831    Specimen:  Blood Updated:  02/02/17 1902    Narrative:       The following orders were created for panel order CBC & Differential.  Procedure                               Abnormality         Status                     ---------                               -----------         ------                     Scan Slide[[id number]]                    Normal              Final result               CBC Auto Differential[[id number]]         Abnormal            Final result                 Please view results for these tests on the individual orders.    Scan Slide [[id number]]  (Normal) Collected:  02/02/17 1831    Specimen:  Blood Updated:  02/02/17 1902     RBC Morphology Normal      WBC Morphology Normal      Platelet Morphology Normal     Lactic Acid, Plasma [[id number]]  (Normal) Collected:  02/02/17 1831    Specimen:  Blood Updated:  02/02/17 1907     Lactate 1.3 mmol/L     Comprehensive Metabolic Panel [[id number]]  (Abnormal) Collected:  02/02/17 1919    Specimen:  Blood Updated:  02/02/17 2022     Glucose 185 (H) mg/dL       Glucose >180, Hemoglobin A1C recommended.        BUN 19 mg/dL      Creatinine 0.70 mg/dL      Sodium 143 mmol/L      Potassium 3.8 mmol/L      Chloride 103 mmol/L      CO2 25.0 (L) mmol/L      Calcium 9.4 mg/dL      Total Protein 7.6 g/dL      Albumin 4.40 g/dL      ALT (SGPT) 54 U/L      AST (SGOT) 28 U/L      Alkaline Phosphatase 69 U/L      Total Bilirubin 0.6 mg/dL      eGFR Non African Amer 118 mL/min/1.73      Globulin 3.2 gm/dL      A/G Ratio 1.4 g/dL      BUN/Creatinine Ratio 27.1 (H)      Anion Gap 18.8 mmol/L     Narrative:       Abnormal estimated GFR should be followed by more specific studies to confirm end stage chronic renal disease. The equation used for calculation may not be accurate for patients less than 19 years old, greater than 70 years old,  patients at extremes of weight, malnutrition, or with acute renal dysfunction.    Troponin [[id number]]  (Normal) Collected:  02/02/17 1919    Specimen:  Blood Updated:  02/02/17 2022     Troponin I <0.012 ng/mL     Narrative:       Normal Patient Upper Reference Limit (URL) (99th Percentile)=0.03 ng/mL   Non-AMI Illness Reference Limit=0.03-0.11 ng/mL   AMI Confirmation=0.12 ng/mL and above    CBC Auto Differential [[id number]]  (Abnormal) Collected:  02/03/17 0534    Specimen:  Blood Updated:  02/03/17 0621     WBC 7.37 10*3/mm3      RBC 5.33 10*6/mm3      Hemoglobin 16.8 g/dL      Hematocrit 49.2 %      MCV 92.3 fL      MCH 31.5 (H) pg      MCHC 34.1 g/dL      RDW 14.1 %      RDW-SD 47.3 fl      MPV 10.4 fL      Platelets 219 10*3/mm3      Neutrophil % 57.2 %      Lymphocyte % 22.1 %      Monocyte % 14.5 (H) %      Eosinophil % 4.9 %      Basophil % 0.8 %      Immature Grans % 0.5 %      Neutrophils, Absolute 4.21 10*3/mm3      Lymphocytes, Absolute 1.63 10*3/mm3      Monocytes, Absolute 1.07 (H) 10*3/mm3      Eosinophils, Absolute 0.36 10*3/mm3      Basophils, Absolute 0.06 10*3/mm3      Immature Grans, Absolute 0.04 10*3/mm3      nRBC 0.0 /100 WBC     Basic Metabolic Panel [[id number]]  (Abnormal) Collected:  02/03/17 0534    Specimen:  Blood Updated:  02/03/17 0645     Glucose 136 (H) mg/dL      BUN 15 mg/dL      Creatinine 0.70 mg/dL      Sodium 143 mmol/L      Potassium 3.5 mmol/L      Chloride 104 mmol/L      CO2 28.0 mmol/L      Calcium 8.9 mg/dL      eGFR Non African Amer 118 mL/min/1.73      BUN/Creatinine Ratio 21.4      Anion Gap 14.5 mmol/L     Narrative:       Abnormal estimated GFR should be followed by more specific studies to confirm end stage chronic renal disease. The equation used for calculation may not be accurate for patients less than 19 years old, greater than 70 years old, patients at extremes of weight, malnutrition, or with acute renal dysfunction.    Magnesium [[id number]]  (Normal) Collected:   02/03/17 0534    Specimen:  Blood Updated:  02/03/17 0645     Magnesium 1.7 mg/dL     Lipid Panel [[id number]]  (Abnormal) Collected:  02/03/17 0534    Specimen:  Blood Updated:  02/03/17 0645     Total Cholesterol 163 mg/dL      Triglycerides 314 (H) mg/dL      HDL Cholesterol 27 (L) mg/dL      LDL Cholesterol  73 mg/dL      VLDL Cholesterol 62.8 mg/dL      LDL/HDL Ratio 2.71     Narrative:       Reference ranges for triglycerides, VLDL cholesterol, LDL cholesterol, and HDL cholesterol are not true population normal ranges but are threshold levels for increased risk of coronary artery disease established by ATP III guidelines from the National Cholesterol Education Program.    Troponin [[id number]]  (Normal) Collected:  02/03/17 0534    Specimen:  Blood Updated:  02/03/17 0645     Troponin I <0.012 ng/mL     Narrative:       Normal Patient Upper Reference Limit (URL) (99th Percentile)=0.03 ng/mL   Non-AMI Illness Reference Limit=0.03-0.11 ng/mL   AMI Confirmation=0.12 ng/mL and above    Blood Culture [[id number]]  (Normal) Collected:  02/02/17 1831    Specimen:  Blood from Hand, Right Updated:  02/03/17 0701     Blood Culture No growth at less than 24 hours     Blood Culture [[id number]]  (Normal) Collected:  02/02/17 1831    Specimen:  Blood from Arm, Right Updated:  02/03/17 0701     Blood Culture No growth at less than 24 hours     Urinalysis With / Microscopic If Indicated [[id number]] Collected:  02/03/17 1029    Specimen:  Urine from Urine, Clean Catch Updated:  02/03/17 1043    Urinalysis, Microscopic Only [[id number]] Collected:  02/03/17 1029    Specimen:  Urine from Urine, Clean Catch Updated:  02/03/17 1058          Imaging Results (all)     Procedure Component Value Units Date/Time    CT Head With & Without Contrast [[id number]] Collected:  02/02/17 2140     Updated:  02/02/17 2145    Narrative:       FINAL REPORT    CLINICAL HISTORY:  SEVERE FRONTAL HEADACHE.    FINDINGS:  Multiple contiguous axial slices through  "the head were obtained both before and after the intravenous administration of contrast with coronal reformatted images.    There is mild age-appropriate cortical atrophy with associated ventricular enlargement. There is no acute infarct, hemorrhage or mass effect. Intracranial vessels enhance normally without focus of abnormal enhancement.    There is moderate mucosal thickening of the bilateral ethmoid sphenoid and maxillary sinuses with a moderate left and large right maxillary sinus air-fluid level. Mastoid air cells are clear. Frontal sinuses are nonaerated. There is no skull fracture.    Impression:    No acute intracranial abnormality    Acute on chronic sinusitis      Impression:       Authenticated by Carlos Ballard MD on 02/02/2017 09:40:12 PM    XR Chest PA & Lateral [[id number]] Collected:  02/03/17 0753     Updated:  02/03/17 0756    Narrative:       PROCEDURE: XR CHEST PA AND LATERAL-        HISTORY: dyspnea     COMPARISON: None.     FINDINGS: The heart is normal in size. The mediastinum is unremarkable.  There is bronchial wall thickening in the left lung base which may  reflect bronchitis. No focal opacities were effusions are evident. There  is no pneumothorax. There are no acute osseous abnormalities.           Impression:       Bronchial wall thickening in the left lung base which may  reflect bronchitis.           This report was finalized on 2/3/2017 7:53 AM by Anay Collazo M.D..          Condition on Discharge:      Stable    Vital Signs:    Visit Vitals   • /89   • Pulse 56   • Temp 97.4 °F (36.3 °C) (Oral)   • Resp 18   • Ht 70\" (177.8 cm)   • Wt 227 lb 9.6 oz (103 kg)   • SpO2 95%   • BMI 32.66 kg/m2       Physical Exam:      General Appearance:    Alert, cooperative, in no acute distress   Head:    Normocephalic, without obvious abnormality, atraumatic   Eyes:            Lids and lashes normal, conjunctivae and sclerae normal, no   icterus, no pallor, corneas clear, PERRLA "   Ears:    Ears appear intact with no abnormalities noted   Throat:   No oral lesions, no thrush, oral mucosa moist   Neck:   No adenopathy, supple, trachea midline, no thyromegaly, no   carotid bruit, no JVD   Back:     No kyphosis present, no scoliosis present, no skin lesions,      erythema or scars, no tenderness to percussion or                   palpation,   range of motion normal   Lungs:     expiratory wheezes bilaterally,respirations regular, even and      unlabored    Heart:    Regular rhythm and normal rate, normal S1 and S2, no            murmur, no gallop, no rub, no click   Chest Wall:    No abnormalities observed   Abdomen:     Normal bowel sounds, no masses, no organomegaly, soft        non-tender, non-distended, no guarding, no rebound                tenderness   Rectal:     Deferred   Extremities:   Moves all extremities well, no edema, no cyanosis, no             redness   Pulses:   Pulses palpable and equal bilaterally   Skin:   No bleeding, bruising or rash   Lymph nodes:   No palpable adenopathy   Neurologic:   Cranial nerves 2 - 12 grossly intact, sensation intact, DTR       present and equal bilaterally       Discharge Disposition:    Home or Self Care    Discharge Medication:     Rice, Demi   Home Medication Instructions JILLIAN:[id number]    Printed on:02/03/17 1057   Medication Information                      amLODIPine-benazepril (LOTREL) 10-40 MG per capsule  Take 1 capsule by mouth Daily.             benzonatate (TESSALON) 200 MG capsule  Take 1 capsule by mouth 3 (Three) Times a Day As Needed for cough.             brimonidine-timolol (COMBIGAN) 0.2-0.5 % ophthalmic solution  2 drops 1 (one) time. 2 drops in left eye once per day             buPROPion SR (WELLBUTRIN SR) 150 MG 12 hr tablet  Take 1 tablet by mouth 3 (Three) Times a Day.             diazepam (VALIUM) 5 MG tablet  Take 5 mg by mouth Every 12 (Twelve) Hours As Needed for anxiety.             guaiFENesin (MUCINEX) 600 MG 12  hr tablet  Take 1 tablet by mouth 2 (Two) Times a Day.             hydrochlorothiazide (MICROZIDE) 12.5 MG capsule  Take 1 capsule by mouth Daily.             hydrocortisone (ANUSOL-HC) 2.5 % rectal cream  Insert  into the rectum 2 (Two) Times a Day.             levoFLOXacin (LEVAQUIN) 500 MG tablet  Take 1 tablet by mouth Daily.             predniSONE (DELTASONE) 10 MG tablet  Take 1 tablet by mouth Daily. Take 4 for 3 days, then take 3 for 3 days, then take 2 for 3 days, then take 1 for 3 days, then stop             PROAIR  (90 BASE) MCG/ACT inhaler  Inhale 2 puffs Every 6 (Six) Hours As Needed for wheezing or shortness of air.             Testosterone Cypionate (DEPOTESTOTERONE CYPIONATE) 200 MG/ML injection  Inject 1 mL into the shoulder, thigh, or buttocks every 14 (fourteen) days.               Zyrtec 10 mg daily  Aspirin 325mg daily    Discharge Diet:   Healthy heart diet          Activity at Discharge:      as tolerated    Follow-up Appointments:    DR Lu 1 week  Dr. Butts 2 weeks    Test Results Pending at Discharge:     Order Current Status    Urinalysis With / Microscopic If Indicated In process    Urinalysis, Microscopic Only In process    Blood Culture Preliminary result    Blood Culture Preliminary result             Benton Lacy DO  02/03/17  10:59 AM

## 2017-02-07 LAB
BACTERIA SPEC AEROBE CULT: NORMAL
BACTERIA SPEC AEROBE CULT: NORMAL

## 2017-02-09 ENCOUNTER — TELEPHONE (OUTPATIENT)
Dept: INTERNAL MEDICINE | Facility: CLINIC | Age: 57
End: 2017-02-09

## 2017-02-14 ENCOUNTER — OFFICE VISIT (OUTPATIENT)
Dept: INTERNAL MEDICINE | Facility: CLINIC | Age: 57
End: 2017-02-14

## 2017-02-14 VITALS
OXYGEN SATURATION: 96 % | BODY MASS INDEX: 32.67 KG/M2 | WEIGHT: 228.2 LBS | SYSTOLIC BLOOD PRESSURE: 124 MMHG | HEART RATE: 97 BPM | HEIGHT: 70 IN | DIASTOLIC BLOOD PRESSURE: 80 MMHG | TEMPERATURE: 98.5 F | RESPIRATION RATE: 12 BRPM

## 2017-02-14 DIAGNOSIS — R73.9 ELEVATED BLOOD SUGAR: ICD-10-CM

## 2017-02-14 DIAGNOSIS — R06.02 SHORTNESS OF BREATH: ICD-10-CM

## 2017-02-14 DIAGNOSIS — E29.1 HYPOGONADISM IN MALE: ICD-10-CM

## 2017-02-14 DIAGNOSIS — J47.9: Primary | ICD-10-CM

## 2017-02-14 DIAGNOSIS — R59.0 HILAR LYMPHADENOPATHY: ICD-10-CM

## 2017-02-14 DIAGNOSIS — I10 ESSENTIAL HYPERTENSION: ICD-10-CM

## 2017-02-14 PROCEDURE — 96372 THER/PROPH/DIAG INJ SC/IM: CPT | Performed by: FAMILY MEDICINE

## 2017-02-14 PROCEDURE — 99214 OFFICE O/P EST MOD 30 MIN: CPT | Performed by: FAMILY MEDICINE

## 2017-02-14 RX ORDER — HYDROCHLOROTHIAZIDE 25 MG/1
25 TABLET ORAL DAILY
Qty: 90 TABLET | Refills: 1 | Status: SHIPPED | OUTPATIENT
Start: 2017-02-14 | End: 2017-04-26 | Stop reason: HOSPADM

## 2017-02-14 RX ADMIN — TESTOSTERONE CYPIONATE 300 MG: 200 INJECTION, SOLUTION INTRAMUSCULAR at 16:51

## 2017-02-14 NOTE — PROGRESS NOTES
Hospital follow up. He is still having SOA with activity. He says his BP is also spiking with activity and even stress. His wife is checking his BP. 140/100-110 when it is high. He gets headaches, lightheaded, some chest tightness. Every night when he lays down, he feels a itchy sensation in left side neck and left side chest.      SUBJECTIVE: Demi Meyer is a 56 y.o. male seen for a hospital follow-up visit;    Recurrent bronchitis: improved with breathing treatments and antibiotics, still having shortness of breath with activity but not at rest.  Did not have CT chest during admission.  Sputum cxs were negative.  Getting some headaches, lightheadedness, left sided itching/irritation.  Had cardiac workup while hospitalized including echo, normal.  His BP has been going up a lot with activity.      The following portions of the patient's history were reviewed and updated as appropriate: He  has a past medical history of Arthritis; Diverticulitis; Diverticulosis; Gastric ulcer; Hypertension; Renal calculi; and Stroke.  He has Essential hypertension and Hypogonadism in male on his pertinent problem list.  He  has a past surgical history that includes Appendectomy; Eye surgery; Lumbar fusion (1999); Tonsillectomy; Hernia repair (2011); Back surgery; and Abdominal surgery.  His family history includes Alcohol abuse in his brother and maternal grandfather; Asthma in his sister; COPD in his maternal grandfather; Cancer in his brother, maternal grandfather, mother, and paternal grandmother; Diabetes in his father and mother; Esophageal cancer in his paternal grandmother; Heart attack in his father; Hypertension in his father, maternal grandfather, and mother; Irritable bowel syndrome in his father and sister; Lung cancer in his maternal grandfather; No Known Problems in his brother, maternal grandmother, maternal uncle, maternal uncle, and paternal aunt; Prostate cancer (age of onset: 61) in his brother; Stomach cancer in  "his maternal aunt.  He  reports that he has quit smoking. His smoking use included Cigarettes. He has a 5.00 pack-year smoking history. He has quit using smokeless tobacco. His smokeless tobacco use included Chew. He reports that he drinks about 0.6 oz of alcohol per week  He reports that he does not use illicit drugs..    Review of Systems   Constitutional: Positive for diaphoresis and fatigue. Negative for chills and fever.   Respiratory: Positive for cough, chest tightness and shortness of breath. Negative for stridor.    Cardiovascular: Negative for chest pain and leg swelling.   Neurological: Positive for light-headedness. Negative for syncope.         OBJECTIVE:  Visit Vitals   • /80   • Pulse 97   • Temp 98.5 °F (36.9 °C)   • Resp 12   • Ht 70\" (177.8 cm)   • Wt 228 lb 3.2 oz (104 kg)   • SpO2 96%   • BMI 32.74 kg/m2        Physical Exam   Constitutional: He appears well-developed and well-nourished. No distress.   Cardiovascular: Normal rate and regular rhythm.    Pulmonary/Chest: Effort normal and breath sounds normal.       Lab review - negative workup during hospital stay    Independent CXR review: increased hilar lymphadenopathy        ASSESSMENT:  1. Recurrent bronchiectasis    - Full Pulmonary Function Test With Bronchodilator; Future  - CBC (No Diff); Future  - Lactate Dehydrogenase; Future  - Alpha - 1 - Antitrypsin; Future  - Sedimentation Rate; Future  - C-reactive Protein; Future  - D-dimer, Quantitative; Future  - CT Chest Hi Resolution; Future    2. Essential hypertension  worsening  - hydrochlorothiazide (HYDRODIURIL) 25 MG tablet; Take 1 tablet by mouth Daily.  Dispense: 90 tablet; Refill: 1    3. Hypogonadism in male  stable    4. Hilar lymphadenopathy  New probe, concerned for sarcoidoisis  - CBC (No Diff); Future  - Lactate Dehydrogenase; Future  - Alpha - 1 - Antitrypsin; Future  - Sedimentation Rate; Future  - C-reactive Protein; Future  - CT Chest Hi Resolution; Future    5. " Shortness of breath  improving  - Full Pulmonary Function Test With Bronchodilator; Future  - CBC (No Diff); Future  - Lactate Dehydrogenase; Future  - Alpha - 1 - Antitrypsin; Future  - Sedimentation Rate; Future  - C-reactive Protein; Future  - D-dimer, Quantitative; Future  - CT Chest Hi Resolution; Future    6. Elevated blood sugar    - Hemoglobin A1c; Future        I, Angelita Lu MD, hereby attest that the medical record entry above accurately reflects signatures/notations that I made in my capacity as Angelita Lu MD when I treated and diagnosed the above patient.  I do hereby attest that his information is true, accurate, and complete to the best of my knowledge and I understand that any falsification, omission, or concealment of material fact may subject me to administrative, civil, or criminal liability.

## 2017-03-01 ENCOUNTER — LAB (OUTPATIENT)
Dept: INTERNAL MEDICINE | Facility: CLINIC | Age: 57
End: 2017-03-01

## 2017-03-01 DIAGNOSIS — R59.0 HILAR LYMPHADENOPATHY: ICD-10-CM

## 2017-03-01 DIAGNOSIS — J47.9: ICD-10-CM

## 2017-03-01 DIAGNOSIS — R73.9 ELEVATED BLOOD SUGAR: ICD-10-CM

## 2017-03-01 DIAGNOSIS — R06.02 SHORTNESS OF BREATH: ICD-10-CM

## 2017-03-01 LAB
CRP SERPL-MCNC: 10.5 MG/DL (ref 0–10)
D DIMER PPP FEU-MCNC: 0.28 MG/L (FEU) (ref 0–0.5)
DEPRECATED RDW RBC AUTO: 50.9 FL (ref 37–54)
ERYTHROCYTE [DISTWIDTH] IN BLOOD BY AUTOMATED COUNT: 14.7 % (ref 11.3–14.5)
ERYTHROCYTE [SEDIMENTATION RATE] IN BLOOD: 6 MM/HR (ref 0–20)
HBA1C MFR BLD: 6.6 % (ref 4.8–5.6)
HCT VFR BLD AUTO: 52.1 % (ref 38.9–50.9)
HGB BLD-MCNC: 17.6 G/DL (ref 13.1–17.5)
LDH SERPL-CCNC: 189 U/L (ref 120–246)
MCH RBC QN AUTO: 32 PG (ref 27–31)
MCHC RBC AUTO-ENTMCNC: 33.8 G/DL (ref 32–36)
MCV RBC AUTO: 94.7 FL (ref 80–99)
PLATELET # BLD AUTO: 274 10*3/MM3 (ref 150–450)
PMV BLD AUTO: 10.4 FL (ref 6–12)
RBC # BLD AUTO: 5.5 10*6/MM3 (ref 4.2–5.76)
WBC NRBC COR # BLD: 8.46 10*3/MM3 (ref 3.5–10.8)

## 2017-03-01 PROCEDURE — 85379 FIBRIN DEGRADATION QUANT: CPT | Performed by: FAMILY MEDICINE

## 2017-03-01 PROCEDURE — 82103 ALPHA-1-ANTITRYPSIN TOTAL: CPT | Performed by: FAMILY MEDICINE

## 2017-03-01 PROCEDURE — 83036 HEMOGLOBIN GLYCOSYLATED A1C: CPT | Performed by: FAMILY MEDICINE

## 2017-03-01 PROCEDURE — 96372 THER/PROPH/DIAG INJ SC/IM: CPT | Performed by: FAMILY MEDICINE

## 2017-03-01 PROCEDURE — 36415 COLL VENOUS BLD VENIPUNCTURE: CPT | Performed by: FAMILY MEDICINE

## 2017-03-01 PROCEDURE — 85027 COMPLETE CBC AUTOMATED: CPT | Performed by: FAMILY MEDICINE

## 2017-03-01 PROCEDURE — 85652 RBC SED RATE AUTOMATED: CPT | Performed by: FAMILY MEDICINE

## 2017-03-01 PROCEDURE — 86140 C-REACTIVE PROTEIN: CPT | Performed by: FAMILY MEDICINE

## 2017-03-01 PROCEDURE — 83615 LACTATE (LD) (LDH) ENZYME: CPT | Performed by: FAMILY MEDICINE

## 2017-03-01 RX ADMIN — TESTOSTERONE CYPIONATE 300 MG: 200 INJECTION, SOLUTION INTRAMUSCULAR at 12:39

## 2017-03-02 ENCOUNTER — HOSPITAL ENCOUNTER (OUTPATIENT)
Dept: CT IMAGING | Facility: HOSPITAL | Age: 57
Discharge: HOME | End: 2017-03-02
Attending: FAMILY MEDICINE | Admitting: FAMILY MEDICINE

## 2017-03-02 DIAGNOSIS — J47.9: ICD-10-CM

## 2017-03-02 DIAGNOSIS — R06.02 SHORTNESS OF BREATH: ICD-10-CM

## 2017-03-02 DIAGNOSIS — R59.0 HILAR LYMPHADENOPATHY: ICD-10-CM

## 2017-03-02 LAB — A1AT SERPL-MCNC: 157 MG/DL (ref 90–200)

## 2017-03-02 PROCEDURE — 71250 CT THORAX DX C-: CPT

## 2017-03-09 ENCOUNTER — LAB (OUTPATIENT)
Dept: LAB | Facility: HOSPITAL | Age: 57
End: 2017-03-09
Attending: INTERNAL MEDICINE

## 2017-03-09 ENCOUNTER — OFFICE VISIT (OUTPATIENT)
Dept: PULMONOLOGY | Facility: CLINIC | Age: 57
End: 2017-03-09

## 2017-03-09 VITALS
DIASTOLIC BLOOD PRESSURE: 62 MMHG | SYSTOLIC BLOOD PRESSURE: 110 MMHG | BODY MASS INDEX: 31.92 KG/M2 | HEIGHT: 70 IN | WEIGHT: 223 LBS | HEART RATE: 76 BPM | RESPIRATION RATE: 18 BRPM | OXYGEN SATURATION: 97 %

## 2017-03-09 DIAGNOSIS — E66.9 OBESITY (BMI 30-39.9): ICD-10-CM

## 2017-03-09 DIAGNOSIS — J30.89 OTHER ALLERGIC RHINITIS: ICD-10-CM

## 2017-03-09 DIAGNOSIS — J45.40 MODERATE PERSISTENT ASTHMA WITHOUT COMPLICATION: ICD-10-CM

## 2017-03-09 DIAGNOSIS — R06.02 SHORTNESS OF BREATH: Primary | ICD-10-CM

## 2017-03-09 PROCEDURE — 86003 ALLG SPEC IGE CRUDE XTRC EA: CPT | Performed by: INTERNAL MEDICINE

## 2017-03-09 PROCEDURE — 82785 ASSAY OF IGE: CPT | Performed by: INTERNAL MEDICINE

## 2017-03-09 PROCEDURE — 99245 OFF/OP CONSLTJ NEW/EST HI 55: CPT | Performed by: INTERNAL MEDICINE

## 2017-03-09 PROCEDURE — 95012 NITRIC OXIDE EXP GAS DETER: CPT | Performed by: INTERNAL MEDICINE

## 2017-03-09 PROCEDURE — 36415 COLL VENOUS BLD VENIPUNCTURE: CPT

## 2017-03-09 RX ORDER — FLUNISOLIDE 0.25 MG/ML
1 SOLUTION NASAL EVERY 12 HOURS
Qty: 1 BOTTLE | Refills: 5 | Status: SHIPPED | OUTPATIENT
Start: 2017-03-09 | End: 2017-09-20 | Stop reason: SDUPTHER

## 2017-03-09 NOTE — PROGRESS NOTES
CONSULT NOTE    Chief Complaint   Patient presents with   • Asthma       Subjective   Demi Meyer is a 56 y.o. male.     History of Present Illness   Patient comes in today for consultation because of shortness of breath.  The patient says that since last summer he's had sinus infection and bronchitis requiring antibiotics and steroids.  He also mentions a roof leak and identification of some mold in the house, although he says that his symptoms had started even before the roof leak?    He denies any history of smoking.  He does have a positive family history of asthma in his son and sister.  He uses albuterol inhaler on a regular basis and has been needing it more regularly lately.    Patient also complains of runny nose and dribbling in the back of the throat for the past few weeks. This has been sometimes associated with seasonal variation.    The following portions of the patient's history were reviewed and updated as appropriate: allergies, current medications, past family history, past medical history, past social history and past surgical history.    Review of Systems   HENT: Negative for sinus pressure, sneezing and sore throat.    Respiratory: Positive for shortness of breath. Negative for cough and wheezing.    Cardiovascular: Negative for palpitations and leg swelling.   Psychiatric/Behavioral: Negative for sleep disturbance.   All other systems reviewed and are negative.      Past Medical History   Diagnosis Date   • Arthritis    • Diverticulitis    • Diverticulosis    • Gastric ulcer    • Hypertension    • Renal calculi    • Stroke      TIA - slurred speech, discoordinated       Social History   Substance Use Topics   • Smoking status: Former Smoker     Packs/day: 0.25     Years: 20.00     Types: Cigarettes   • Smokeless tobacco: Former User     Types: Chew   • Alcohol use 0.6 oz/week     1 Cans of beer per week      Comment: occassional         Objective   Visit Vitals   • /62   • Pulse 76   •  "Resp 18   • Ht 70\" (177.8 cm)   • Wt 223 lb (101 kg)   • SpO2 97%   • BMI 32 kg/m2     Physical Exam   Constitutional: He is oriented to person, place, and time. He appears well-developed and well-nourished.   HENT:   Head: Atraumatic.   Fillings noted.   Eyes: EOM are normal.   Heterochromia noted   Neck: No JVD present. No tracheal deviation present. No thyromegaly present.   Increased adipose tissue   Cardiovascular: Normal rate and regular rhythm.    Pulmonary/Chest: Effort normal. No respiratory distress. He has wheezes.   Musculoskeletal: Normal range of motion. He exhibits no edema.   Neurological: He is alert and oriented to person, place, and time.   Skin: Skin is warm and dry.   Psychiatric: He has a normal mood and affect. His behavior is normal.   Vitals reviewed.          Assessment/Plan   Demi was seen today for asthma.    Diagnoses and all orders for this visit:    Shortness of breath  -     Pulmonary Function Test; Future    Moderate persistent asthma without complication  -     Nitric Oxide  -     Pulmonary Function Test; Future    Other allergic rhinitis  -     IgE; Future  -     Allergens, Zone 8; Future    Obesity (BMI 30-39.9)    Other orders  -     flunisolide (NASALIDE) 25 MCG/ACT (0.025%) solution nasal spray; Inhale 1 spray Every 12 (Twelve) Hours.  -     Fluticasone Furoate (ARNUITY ELLIPTA) 200 MCG/ACT aerosol powder ; Inhale 1 puff Daily. Rinse mouth with water after use.           Return in about 6 weeks (around 4/20/2017) for Recheck, Labs, Give pt sleep questionairre.    DISCUSSION(if any):  I have reviewed the patient's imaging studies and shared them with him.  The CT scan did not suggest any significant abnormality.    I have also reviewed his last primary care provider's office note that mentions shortness of breath with activity.     His last echocardiogram suggested grade 1 diastolic dysfunction.    Laboratory workup was also reviewed which showed normal alpha-1 antitrypsin " levels with elevated absolute eosinophil count of 360.    ==============================  ==============================    Have had a very long discussion with the patient and after reviewing the data, it seems that he has asthma which is somewhat poorly controlled.  He suffers from glaucoma which makes long-acting beta agonist or long-acting muscarinic agents difficult to use and therefore I have started him on inhaled cortical steroid.    Since he has been able to use albuterol based inhalers in the past, I have advised him to use albuterol based rescue inhaler for when necessary purposes    Patient was also advised to keep a log of the use of rescue inhaler.    His FeNO level was elevated at 28 suggesting likely asthma.    I have given him a sleep questionnaire    Patient will be started on nasal steroids for symptoms which are definitely consistent with seasonal allergic rhinitis.     I have ordered IgE/RAST panel.    Errors in dictation may reflect use of voice recognition software and not all errors in transcription may have been detected prior to signing    This document was electronically signed by Carmen Butts MD March 20, 2017  5:46 PM

## 2017-03-10 LAB — TOTAL IGE SMQN RAST: 413 IU/ML (ref 0–100)

## 2017-03-13 LAB
A ALTERNATA IGE QN: <0.1 KU/L
A FUMIGATUS IGE QN: <0.1 KU/L
AMER ROACH IGE QN: 0.3 KU/L
BAHIA GRASS IGE QN: 1.51 KU/L
BERMUDA GRASS IGE QN: 1.05 KU/L
BOXELDER IGE QN: 1.03 KU/L
C HERBARUM IGE QN: <0.1 KU/L
CAT DANDER IGG QN: <0.1 KU/L
CMN PIGWEED IGE QN: 0.99 KU/L
COMMON RAGWEED IGE QN: 15.2 KU/L
CONV CLASS DESCRIPTION: ABNORMAL
D FARINAE IGE QN: 2.28 KU/L
D PTERONYSS IGE QN: 0.11 KU/L
DOG DANDER IGE QN: 0.59 KU/L
ENGL PLANTAIN IGE QN: 0.91 KU/L
HAZELNUT POLN IGE QN: 0.53 KU/L
JOHNSON GRASS IGE QN: 1.04 KU/L
KENT BLUE GRASS IGE QN: 1.01 KU/L
M RACEMOSUS IGE QN: 0.6 KU/L
MT JUNIPER IGE QN: 12.7 KU/L
MUGWORT IGE QN: 3.39 KU/L
NETTLE IGE QN: 1.03 KU/L
P NOTATUM IGE QN: <0.1 KU/L
S BOTRYOSUM IGE QN: <0.1 KU/L
SHEEP SORREL IGE QN: 1.04 KU/L
SWEET GUM IGE QN: 0.81 KU/L
T011-IGE MAPLE LEAF SYCAMORE: 0.9 KU/L
WHITE ELM IGE QN: 1.04 KU/L
WHITE HICKORY IGE QN: 0.89 KU/L
WHITE MULBERRY IGE QN: 0.67 KU/L
WHITE OAK IGE QN: 0.96 KU/L

## 2017-03-15 ENCOUNTER — CLINICAL SUPPORT (OUTPATIENT)
Dept: INTERNAL MEDICINE | Facility: CLINIC | Age: 57
End: 2017-03-15

## 2017-03-15 DIAGNOSIS — E29.1 HYPOGONADISM IN MALE: Primary | ICD-10-CM

## 2017-03-15 PROCEDURE — 96372 THER/PROPH/DIAG INJ SC/IM: CPT | Performed by: FAMILY MEDICINE

## 2017-03-15 RX ORDER — TESTOSTERONE CYPIONATE 200 MG/ML
300 INJECTION, SOLUTION INTRAMUSCULAR ONCE
Status: COMPLETED | OUTPATIENT
Start: 2017-03-15 | End: 2017-03-15

## 2017-03-15 RX ADMIN — TESTOSTERONE CYPIONATE 300 MG: 200 INJECTION, SOLUTION INTRAMUSCULAR at 16:34

## 2017-03-24 ENCOUNTER — OFFICE VISIT (OUTPATIENT)
Dept: SURGERY | Facility: CLINIC | Age: 57
End: 2017-03-24

## 2017-03-24 VITALS
SYSTOLIC BLOOD PRESSURE: 126 MMHG | RESPIRATION RATE: 18 BRPM | WEIGHT: 240 LBS | BODY MASS INDEX: 34.36 KG/M2 | HEIGHT: 70 IN | TEMPERATURE: 99 F | DIASTOLIC BLOOD PRESSURE: 88 MMHG | HEART RATE: 80 BPM

## 2017-03-24 DIAGNOSIS — L02.811 CUTANEOUS ABSCESS OF HEAD EXCLUDING FACE: Primary | ICD-10-CM

## 2017-03-24 PROCEDURE — 10061 I&D ABSCESS COMP/MULTIPLE: CPT | Performed by: SURGERY

## 2017-03-24 PROCEDURE — 99203 OFFICE O/P NEW LOW 30 MIN: CPT | Performed by: SURGERY

## 2017-03-24 RX ORDER — SULFAMETHOXAZOLE AND TRIMETHOPRIM 800; 160 MG/1; MG/1
1 TABLET ORAL 2 TIMES DAILY
Qty: 6 TABLET | Refills: 0 | Status: SHIPPED | OUTPATIENT
Start: 2017-03-24 | End: 2017-03-27

## 2017-03-24 NOTE — PROGRESS NOTES
Reason for Consultation:  Abscess @ right cervical area.      Chief complaint   Chief Complaint   Patient presents with   • Abscess     right cervical area.       Subjective .     History of present illness:  I saw the patient in the office  today as a consultation for evaluation and treatment of a cyst-like mass @ right cervical area which has been present for @ 48 hours, he complains of much pain with redness, warmth and drainage, he denies injury and/or trauma at the site.    Review of Systems   Constitutional: Negative for chills, fever and unexpected weight change.   HENT: Negative for trouble swallowing and voice change.    Eyes: Negative for visual disturbance.   Respiratory: Negative for apnea, cough, chest tightness, shortness of breath and wheezing.    Cardiovascular: Negative for chest pain, palpitations and leg swelling.   Gastrointestinal: Negative for abdominal distention, abdominal pain, anal bleeding, blood in stool, constipation, diarrhea, nausea, rectal pain and vomiting.   Endocrine: Negative for cold intolerance and heat intolerance.   Genitourinary: Negative for difficulty urinating, dysuria, flank pain, scrotal swelling and testicular pain.   Musculoskeletal: Positive for neck pain. Negative for back pain, gait problem and joint swelling.   Skin: Positive for color change and wound. Negative for rash.   Neurological: Negative for dizziness, syncope, speech difficulty, weakness, numbness and headaches.   Hematological: Negative for adenopathy. Does not bruise/bleed easily.   Psychiatric/Behavioral: Negative for confusion. The patient is not nervous/anxious.        History  Past Medical History:   Diagnosis Date   • Arthritis    • Asthma    • Diverticulitis    • Diverticulosis    • Gastric ulcer    • Hypertension    • Renal calculi    • Stroke     TIA - slurred speech, discoordinated          Past Surgical History:   Procedure Laterality Date   • ABDOMINAL SURGERY     • APPENDECTOMY     •  BACK SURGERY     • EYE SURGERY     • HERNIA REPAIR  2011    ventral & hiatal w/ mesh   • LUMBAR FUSION  1999    L3-S1   • TONSILLECTOMY            Family History   Problem Relation Age of Onset   • Hypertension Mother    • Cancer Mother      vulvar   • Diabetes Mother    • Diabetes Father    • Heart attack Father    • Irritable bowel syndrome Father    • Hypertension Father    • Irritable bowel syndrome Sister    • Cancer Brother    • Alcohol abuse Brother    • Prostate cancer Brother 61   • Stomach cancer Maternal Aunt      metastatic   • No Known Problems Maternal Uncle    • No Known Problems Paternal Aunt    • No Known Problems Maternal Grandmother    • Lung cancer Maternal Grandfather    • COPD Maternal Grandfather    • Alcohol abuse Maternal Grandfather    • Hypertension Maternal Grandfather    • Cancer Maternal Grandfather    • Esophageal cancer Paternal Grandmother    • Cancer Paternal Grandmother    • Asthma Sister    • No Known Problems Brother    • No Known Problems Maternal Uncle           Social History   Substance Use Topics   • Smoking status: Former Smoker     Packs/day: 0.25     Years: 20.00     Types: Cigarettes   • Smokeless tobacco: Former User     Types: Chew   • Alcohol use 0.6 oz/week     1 Cans of beer per week      Comment: occassional    and Allergies:  Amoxicillin; Erythromycin; Celecoxib; Citalopram; Clonidine derivatives; Gabapentin; and Hydrocodone    Objective     Vital Signs   Temp:  [99 °F (37.2 °C)] 99 °F (37.2 °C)  Heart Rate:  [80] 80  Resp:  [18] 18  BP: (126)/(88) 126/88    Physical Exam:     General Appearance:    Alert, cooperative, in no acute distress   Head:    Normocephalic, without obvious abnormality, atraumatic   Eyes:            Lids and lashes normal, conjunctivae and sclerae normal, no   icterus, no pallor, corneas clear.   Ears:    Ears appear intact with no abnormalities noted   Throat:   No oral lesions, no thrush, oral mucosa moist   Neck:   No adenopathy, supple,  trachea midline, no thyromegaly, no   carotid bruit. 6 cm abscess right cervical region   Lungs/Respiratory:     Clear to auscultation,respirations regular, even and                  unlabored    Heart/Cardiovascular:    Regular rhythm and normal rate, no Murmur.   Abdomen:     Normal bowel sounds, no masses, no organomegaly, soft        non-tender, non-distended, no guarding   Extremities:   Moves all extremities well, no edema, no cyanosis, no             Redness.   Pulses:   Pulses palpable and equal bilaterally   Skin:   No bleeding, bruising or rash. Sinus tract and draining pilonidal cyst on sacrum.   Lymph nodes:   No palpable adenopathy   Neurologic:   Cranial nerves 2 - 12 grossly intact, sensation intact.       Results Review:   I reviewed the patient's new clinical results.      Assessment/Plan   Cervical abscess, right    Procedure:    I recommended abscess drainage to the patient. I explained the indication as well as the risks and benefits which include bleeding, further infection requiring additional procedures, non healing of the wound etc. The patient understands these and wishes to proceed.      The patient was brought to the procedure room. Consent and time out were performed. The area was prepped and draped in the usual fashion. 1% lidocaine with epinephrine was infused locally. The abscess was then incised and drained sharply with a #11 blade. Purulent contents were evacuated and irrigated with saline and peroxide. Minimal blood loss had occurred and was well controlled with pressure. There were no complications and the patient tolerated the procedure well. Wound instructions were given.  follow-up one week      I discussed the patients findings and my recommendations with patient and consulting provider.        Gabriela Gordon MD  03/24/17  1:58 PM

## 2017-03-28 ENCOUNTER — OFFICE VISIT (OUTPATIENT)
Dept: SURGERY | Facility: CLINIC | Age: 57
End: 2017-03-28

## 2017-03-28 VITALS
WEIGHT: 240 LBS | HEIGHT: 70 IN | BODY MASS INDEX: 34.36 KG/M2 | SYSTOLIC BLOOD PRESSURE: 130 MMHG | DIASTOLIC BLOOD PRESSURE: 86 MMHG | TEMPERATURE: 98.2 F

## 2017-03-28 DIAGNOSIS — Z48.89 POSTOPERATIVE VISIT: Primary | ICD-10-CM

## 2017-03-28 PROCEDURE — 99024 POSTOP FOLLOW-UP VISIT: CPT | Performed by: SURGERY

## 2017-03-28 RX ORDER — SULFAMETHOXAZOLE AND TRIMETHOPRIM 800; 160 MG/1; MG/1
1 TABLET ORAL 2 TIMES DAILY
Qty: 10 TABLET | Refills: 0 | Status: SHIPPED | OUTPATIENT
Start: 2017-03-28 | End: 2017-04-02

## 2017-03-28 NOTE — PROGRESS NOTES
"    Patient Care Team:  Angelita Lu MD as PCP - General        Chief Complaint   Patient presents with   • Abscess     s/p i&d right neck drainage       Interval History:     History : Patient is here s/p right neck I&D done on 03/24/17 by Dr. Gordon.  Patient complains of pain and bloody drainage. He was on 3 days of Bactrim.     Review of Systems    Vital Signs  /86 (BP Location: Right arm, Patient Position: Sitting)  Temp 98.2 °F (36.8 °C) (Oral)   Ht 70\" (177.8 cm)  Wt 240 lb (109 kg)  BMI 34.44 kg/m2    Allergies:  Allergies   Allergen Reactions   • Amoxicillin Rash   • Erythromycin Itching   • Celecoxib    • Citalopram    • Clonidine Derivatives    • Gabapentin    • Hydrocodone GI Intolerance       Medications:    Current Outpatient Prescriptions:   •  amLODIPine-benazepril (LOTREL) 10-40 MG per capsule, Take 1 capsule by mouth Daily., Disp: 90 capsule, Rfl: 1  •  aspirin 325 MG tablet, Take 650 mg by mouth Daily As Needed for mild pain (1-3)., Disp: , Rfl:   •  benzonatate (TESSALON) 200 MG capsule, Take 1 capsule by mouth 3 (Three) Times a Day As Needed for cough., Disp: 30 capsule, Rfl: 0  •  brimonidine-timolol (COMBIGAN) 0.2-0.5 % ophthalmic solution, 2 drops 1 (one) time. 2 drops in left eye once per day, Disp: , Rfl:   •  buPROPion SR (WELLBUTRIN SR) 150 MG 12 hr tablet, Take 1 tablet by mouth 3 (Three) Times a Day., Disp: 90 tablet, Rfl: 5  •  cetirizine (zyrTEC) 10 MG tablet, Take 10 mg by mouth Daily., Disp: , Rfl:   •  diazepam (VALIUM) 5 MG tablet, Take 5 mg by mouth Every 12 (Twelve) Hours As Needed for anxiety., Disp: , Rfl:   •  flunisolide (NASALIDE) 25 MCG/ACT (0.025%) solution nasal spray, Inhale 1 spray Every 12 (Twelve) Hours., Disp: 1 bottle, Rfl: 5  •  Fluticasone Furoate (ARNUITY ELLIPTA) 200 MCG/ACT aerosol powder , Inhale 1 puff Daily. Rinse mouth with water after use., Disp: 30 each, Rfl: 5  •  guaiFENesin (MUCINEX) 600 MG 12 hr tablet, Take 1 tablet by mouth 2 (Two) " Times a Day., Disp: 14 tablet, Rfl: 0  •  hydrochlorothiazide (HYDRODIURIL) 25 MG tablet, Take 1 tablet by mouth Daily., Disp: 90 tablet, Rfl: 1  •  hydrochlorothiazide (MICROZIDE) 12.5 MG capsule, Take 1 capsule by mouth Daily., Disp: 90 capsule, Rfl: 1  •  PROAIR  (90 BASE) MCG/ACT inhaler, Inhale 2 puffs Every 6 (Six) Hours As Needed for wheezing or shortness of air., Disp: 1 inhaler, Rfl: 3  •  Testosterone Cypionate (DEPOTESTOTERONE CYPIONATE) 200 MG/ML injection, Inject 1 mL into the shoulder, thigh, or buttocks every 14 (fourteen) days., Disp: , Rfl:   •  predniSONE (DELTASONE) 10 MG tablet, Take 1 tablet by mouth Daily. Take 4 for 3 days, then take 3 for 3 days, then take 2 for 3 days, then take 1 for 3 days, then stop, Disp: 30 tablet, Rfl: 0    Current Facility-Administered Medications:   •  Testosterone Cypionate (DEPOTESTOTERONE CYPIONATE) injection 300 mg, 300 mg, Intramuscular, Q14 Days, Angelita Lu MD, 300 mg at 03/01/17 1239    Physical Exam:     General Appearance:    Alert, cooperative, in no acute distress   Skin:   minor bleeding from the base of the wound is noted.  No significant purulent drainage.  There are still significant amount of induration around the drainage site.  I cauterized this with silver nitrate numbers no bleeding at conclusion.          Assessment/Plan  abscess of the right neck.  Follow-up with Dr. Gordon on Friday.  Continue the antibiotics until then.  The wound appears to be well drained.                Felipe Sanchez MD  03/28/17

## 2017-03-31 ENCOUNTER — OFFICE VISIT (OUTPATIENT)
Dept: SURGERY | Facility: CLINIC | Age: 57
End: 2017-03-31

## 2017-03-31 VITALS — TEMPERATURE: 99.7 F | HEART RATE: 84 BPM

## 2017-03-31 DIAGNOSIS — Z48.89 POSTOPERATIVE VISIT: Primary | ICD-10-CM

## 2017-03-31 PROCEDURE — 99024 POSTOP FOLLOW-UP VISIT: CPT | Performed by: SURGERY

## 2017-03-31 NOTE — PROGRESS NOTES
Reason for Consultation: Follow-up lesion excision    Chief Complaint:   Chief Complaint   Patient presents with   • Post-op     incision and drainage right cervical area.       History of present illness:  I saw the patient in the office today as a followup from their recent incision and drainage of abscess @ right cervical area which was done in the office @ one week ago. Pt continues to complain of pain and drainage.      Vital Signs   Temp:  [99.7 °F (37.6 °C)] 99.7 °F (37.6 °C)  Heart Rate:  [84] 84    Physical Exam:   General Appearance:    Alert, cooperative, in no acute distress   Abdomen:     no masses, no organomegaly, soft non-tender, non-distended, no guarding, wounds are well healed   Chest:      Clear toausculation       Assessment / Plan:    1. Postoperative visit          I did discuss the situation with the patient today in the office and they have done well from their recent incision and drainage,  follow-up in one week, if no improvement may need excision of the residual mass.    Gabriela Gordon MD  03/31/17

## 2017-04-07 ENCOUNTER — OFFICE VISIT (OUTPATIENT)
Dept: SURGERY | Facility: CLINIC | Age: 57
End: 2017-04-07

## 2017-04-07 VITALS — TEMPERATURE: 95.4 F

## 2017-04-07 DIAGNOSIS — L02.01 CUTANEOUS ABSCESS OF FACE: Primary | ICD-10-CM

## 2017-04-07 PROCEDURE — 99213 OFFICE O/P EST LOW 20 MIN: CPT | Performed by: SURGERY

## 2017-04-07 NOTE — PROGRESS NOTES
Reason for Consultation: Follow-up facial abscess    Chief Complaint:       History: One-week follow-up draining abscess right face, has decreased in size.  Minimal drainage.  Still has open ulcer.  No fever or chills.  Some response to antibiotics.    Review of Systems   Constitutional: Negative for chills, fever and unexpected weight change.   HENT: Negative for trouble swallowing and voice change.    Eyes: Negative for visual disturbance.   Respiratory: Negative for apnea, cough, chest tightness, shortness of breath and wheezing.    Cardiovascular: Negative for chest pain, palpitations and leg swelling.   Gastrointestinal: Negative for abdominal distention, abdominal pain, anal bleeding, blood in stool, constipation, diarrhea, nausea, rectal pain and vomiting.   Endocrine: Negative for cold intolerance and heat intolerance.   Genitourinary: Negative for difficulty urinating, dysuria, flank pain, scrotal swelling and testicular pain.   Musculoskeletal: Negative for back pain, gait problem and joint swelling.   Skin: Positive for color change and wound. Negative for rash.   Neurological: Negative for dizziness, syncope, speech difficulty, weakness, numbness and headaches.   Hematological: Negative for adenopathy. Does not bruise/bleed easily.   Psychiatric/Behavioral: Negative for confusion. The patient is not nervous/anxious.        Vital Signs  Temp 95.4 °F (35.2 °C)    Allergies:  Allergies   Allergen Reactions   • Amoxicillin Rash   • Erythromycin Itching   • Celecoxib    • Citalopram    • Clonidine Derivatives    • Gabapentin    • Hydrocodone GI Intolerance       Medications:    Current Outpatient Prescriptions:   •  amLODIPine-benazepril (LOTREL) 10-40 MG per capsule, Take 1 capsule by mouth Daily., Disp: 90 capsule, Rfl: 1  •  aspirin 325 MG tablet, Take 650 mg by mouth Daily As Needed for mild pain (1-3)., Disp: , Rfl:   •  benzonatate (TESSALON) 200 MG capsule, Take 1 capsule by mouth 3 (Three) Times a Day  As Needed for cough., Disp: 30 capsule, Rfl: 0  •  brimonidine-timolol (COMBIGAN) 0.2-0.5 % ophthalmic solution, 2 drops 1 (one) time. 2 drops in left eye once per day, Disp: , Rfl:   •  buPROPion SR (WELLBUTRIN SR) 150 MG 12 hr tablet, Take 1 tablet by mouth 3 (Three) Times a Day., Disp: 90 tablet, Rfl: 5  •  cetirizine (zyrTEC) 10 MG tablet, Take 10 mg by mouth Daily., Disp: , Rfl:   •  diazepam (VALIUM) 5 MG tablet, Take 5 mg by mouth Every 12 (Twelve) Hours As Needed for anxiety., Disp: , Rfl:   •  flunisolide (NASALIDE) 25 MCG/ACT (0.025%) solution nasal spray, Inhale 1 spray Every 12 (Twelve) Hours., Disp: 1 bottle, Rfl: 5  •  Fluticasone Furoate (ARNUITY ELLIPTA) 200 MCG/ACT aerosol powder , Inhale 1 puff Daily. Rinse mouth with water after use., Disp: 30 each, Rfl: 5  •  guaiFENesin (MUCINEX) 600 MG 12 hr tablet, Take 1 tablet by mouth 2 (Two) Times a Day., Disp: 14 tablet, Rfl: 0  •  hydrochlorothiazide (HYDRODIURIL) 25 MG tablet, Take 1 tablet by mouth Daily., Disp: 90 tablet, Rfl: 1  •  hydrochlorothiazide (MICROZIDE) 12.5 MG capsule, Take 1 capsule by mouth Daily., Disp: 90 capsule, Rfl: 1  •  predniSONE (DELTASONE) 10 MG tablet, Take 1 tablet by mouth Daily. Take 4 for 3 days, then take 3 for 3 days, then take 2 for 3 days, then take 1 for 3 days, then stop, Disp: 30 tablet, Rfl: 0  •  PROAIR  (90 BASE) MCG/ACT inhaler, Inhale 2 puffs Every 6 (Six) Hours As Needed for wheezing or shortness of air., Disp: 1 inhaler, Rfl: 3  •  Testosterone Cypionate (DEPOTESTOTERONE CYPIONATE) 200 MG/ML injection, Inject 1 mL into the shoulder, thigh, or buttocks every 14 (fourteen) days., Disp: , Rfl:     Current Facility-Administered Medications:   •  Testosterone Cypionate (DEPOTESTOTERONE CYPIONATE) injection 300 mg, 300 mg, Intramuscular, Q14 Days, Angelita Lu MD, 300 mg at 03/01/17 1239    Physical Exam:   General Appearance:    Alert, cooperative, in no acute distress   Head:    Normocephalic, without  obvious abnormality, atraumatic.  Open abscess right face, neck area.  Necrotic center improving.     Lungs:     Clear to auscultation,respirations regular, even and                  unlabored    Heart:    Regular rhythm and normal rate, normal S1 and S2, no            murmur, no gallop, no rub, no click   Abdomen:     Normal bowel sounds, no masses, no organomegaly, soft        non-tender, non-distended, no guarding, no rebound                tenderness   Extremities:   Moves all extremities well, no edema, no cyanosis, no             redness   Pulses:   Pulses palpable and equal bilaterally   Skin:   No bleeding, bruising or rash      Assessment/Plan     1. Cutaneous abscess of face     follow-up in 4 weeks if abscess has not healed.  May require excision residual mass.    Gabriela Gordon MD  04/07/17

## 2017-04-11 ENCOUNTER — OFFICE VISIT (OUTPATIENT)
Dept: INTERNAL MEDICINE | Facility: CLINIC | Age: 57
End: 2017-04-11

## 2017-04-11 VITALS
BODY MASS INDEX: 33.07 KG/M2 | OXYGEN SATURATION: 98 % | DIASTOLIC BLOOD PRESSURE: 80 MMHG | HEIGHT: 70 IN | TEMPERATURE: 97.3 F | WEIGHT: 231 LBS | SYSTOLIC BLOOD PRESSURE: 120 MMHG | HEART RATE: 71 BPM

## 2017-04-11 DIAGNOSIS — L02.11 ABSCESS, NECK: ICD-10-CM

## 2017-04-11 DIAGNOSIS — R10.9 ABDOMINAL PAIN, UNSPECIFIED LOCATION: ICD-10-CM

## 2017-04-11 DIAGNOSIS — I10 ESSENTIAL HYPERTENSION: ICD-10-CM

## 2017-04-11 DIAGNOSIS — R19.7 DIARRHEA, UNSPECIFIED TYPE: ICD-10-CM

## 2017-04-11 DIAGNOSIS — R31.9 BLOOD IN URINE: Primary | ICD-10-CM

## 2017-04-11 LAB
BILIRUB BLD-MCNC: NEGATIVE MG/DL
CLARITY, POC: CLEAR
COLOR UR: YELLOW
GLUCOSE UR STRIP-MCNC: NEGATIVE MG/DL
KETONES UR QL: NEGATIVE
LEUKOCYTE EST, POC: ABNORMAL
NITRITE UR-MCNC: NEGATIVE MG/ML
PH UR: 5 [PH] (ref 5–8)
PROT UR STRIP-MCNC: NEGATIVE MG/DL
RBC # UR STRIP: ABNORMAL /UL
SP GR UR: 1.01 (ref 1–1.03)
UROBILINOGEN UR QL: NORMAL

## 2017-04-11 PROCEDURE — 81003 URINALYSIS AUTO W/O SCOPE: CPT | Performed by: FAMILY MEDICINE

## 2017-04-11 PROCEDURE — 99214 OFFICE O/P EST MOD 30 MIN: CPT | Performed by: FAMILY MEDICINE

## 2017-04-11 RX ORDER — TAMSULOSIN HYDROCHLORIDE 0.4 MG/1
1 CAPSULE ORAL NIGHTLY
Qty: 7 CAPSULE | Refills: 0 | Status: SHIPPED | OUTPATIENT
Start: 2017-04-11 | End: 2017-05-01

## 2017-04-11 RX ORDER — OXYCODONE HYDROCHLORIDE AND ACETAMINOPHEN 5; 325 MG/1; MG/1
1 TABLET ORAL EVERY 8 HOURS PRN
Qty: 10 TABLET | Refills: 0 | Status: SHIPPED | OUTPATIENT
Start: 2017-04-11 | End: 2017-04-26 | Stop reason: HOSPADM

## 2017-04-11 NOTE — PROGRESS NOTES
Jack Meyer is a 56 y.o. male.     History of Present Illness   Last night woke up with pain on right groin and started urinating blood. He's had a kidney stone around age 42, last night was not as intense of pain. Patient feels like he's about to urinate at times when he does not have to. Has pain in lower abdomen/groin and around right testicle.     Also having some diarrhea. Was diagnosed with asthma in last year or so, has been on many antibiotics. No blood in stool.     Continues to nur infection on right side of neck. Has been seeing surgeons for it, required drainage. Feels he's getting another lesion on that side.     The following portions of the patient's history were reviewed and updated as appropriate: allergies, current medications, past family history, past medical history, past social history, past surgical history and problem list.    Review of Systems   Constitutional: Positive for activity change. Negative for fever.   Gastrointestinal: Positive for abdominal pain and diarrhea. Negative for vomiting.   Genitourinary: Positive for flank pain and testicular pain. Negative for difficulty urinating.       Objective   Physical Exam   Constitutional: He is oriented to person, place, and time. Vital signs are normal. He appears well-developed and well-nourished. He is active. He does not appear ill.   HENT:   Head: Normocephalic and atraumatic.   Right Ear: Hearing normal.   Left Ear: Hearing normal.   Nose: Nose normal.   Eyes: EOM and lids are normal. Pupils are equal, round, and reactive to light.   Neck:       Cardiovascular: Normal rate, regular rhythm and normal heart sounds.    Pulmonary/Chest: Effort normal and breath sounds normal.   Abdominal: Soft. There is tenderness in the suprapubic area and left lower quadrant. There is no rigidity, no rebound, no guarding, no CVA tenderness and negative Garg's sign.   Neurological: He is alert and oriented to person, place, and time. No  cranial nerve deficit. Gait normal.   Skin: Skin is warm. He is not diaphoretic. No cyanosis. Nails show no clubbing.   Psychiatric: He has a normal mood and affect. His speech is normal and behavior is normal. Judgment and thought content normal.   Nursing note and vitals reviewed.       Office Visit on 04/11/2017   Component Date Value Ref Range Status   • Color 04/11/2017 Yellow  Yellow, Straw, Dark Yellow, Erica Final   • Clarity, UA 04/11/2017 Clear  Clear Final   • Glucose, UA 04/11/2017 Negative  Negative, 1000 mg/dL (3+) mg/dL Final   • Bilirubin 04/11/2017 Negative  Negative Final   • Ketones, UA 04/11/2017 Negative  Negative Final   • Specific Gravity  04/11/2017 1.015  1.005 - 1.030 Final   • Blood, UA 04/11/2017 250 Kris/ul* Negative Final   • pH, Urine 04/11/2017 5.0  5.0 - 8.0 Final   • Protein, POC 04/11/2017 Negative  Negative mg/dL Final   • Urobilinogen, UA 04/11/2017 Normal  Normal Final   • Leukocytes 04/11/2017 75 Vicenta/ul* Negative Final   • Nitrite, UA 04/11/2017 Negative  Negative Final         Assessment/Plan   Demi was seen today for cyst, abdominal pain, blood in urine and diarrhea.    Diagnoses and all orders for this visit:    Blood in urine  -     POC Urinalysis Dipstick, Automated  -     CT Abdomen Pelvis Stone Protocol; Future  -     oxyCODONE-acetaminophen (PERCOCET) 5-325 MG per tablet; Take 1 tablet by mouth Every 8 (Eight) Hours As Needed for Severe Pain (7-10).  -     tamsulosin (FLOMAX) 0.4 MG capsule 24 hr capsule; Take 1 capsule by mouth Every Night.    Diarrhea, unspecified type    Abdominal pain, unspecified location    Abscess, neck    Essential hypertension     discussed options for imaging.  Setting up for CT abdomen pelvis stone protocol, if diarrhea is from a colitis or diverticulitis should see that on the imaging as well.  No additional antibiotics at this time.  Needs to follow-up with his surgeon regarding neck lesions.  Encouraged hydration, Flomax to help pass stone  if that is what is going on.  If patient does not have stone needs to see urology.  Blood pressure is controlled at this time despite his discomfort.  Needs to follow up with his primary care provider. Pain medicine discussed, take only if absolutely needed, can be habit forming and cause constipation.

## 2017-04-13 ENCOUNTER — TELEPHONE (OUTPATIENT)
Dept: INTERNAL MEDICINE | Facility: CLINIC | Age: 57
End: 2017-04-13

## 2017-04-13 ENCOUNTER — HOSPITAL ENCOUNTER (OUTPATIENT)
Dept: CT IMAGING | Facility: HOSPITAL | Age: 57
Discharge: HOME | End: 2017-04-13
Attending: FAMILY MEDICINE | Admitting: FAMILY MEDICINE

## 2017-04-13 ENCOUNTER — HOSPITAL ENCOUNTER (EMERGENCY)
Facility: HOSPITAL | Age: 57
Discharge: HOME | End: 2017-04-13
Attending: EMERGENCY MEDICINE | Admitting: EMERGENCY MEDICINE

## 2017-04-13 VITALS
HEIGHT: 70 IN | SYSTOLIC BLOOD PRESSURE: 128 MMHG | DIASTOLIC BLOOD PRESSURE: 91 MMHG | RESPIRATION RATE: 20 BRPM | HEART RATE: 105 BPM | TEMPERATURE: 101.2 F | WEIGHT: 230 LBS | OXYGEN SATURATION: 95 % | BODY MASS INDEX: 32.93 KG/M2

## 2017-04-13 DIAGNOSIS — R31.9 BLOOD IN URINE: ICD-10-CM

## 2017-04-13 DIAGNOSIS — R10.9 ACUTE FLANK PAIN: Primary | ICD-10-CM

## 2017-04-13 DIAGNOSIS — N39.0 URINARY TRACT INFECTION, SITE UNSPECIFIED: ICD-10-CM

## 2017-04-13 LAB
ALBUMIN SERPL-MCNC: 4.8 G/DL (ref 3.5–5)
ALBUMIN/GLOB SERPL: 1.2 G/DL (ref 1–2)
ALP SERPL-CCNC: 81 U/L (ref 38–126)
ALT SERPL W P-5'-P-CCNC: 49 U/L (ref 13–69)
ANION GAP SERPL CALCULATED.3IONS-SCNC: 19 MMOL/L
AST SERPL-CCNC: 40 U/L (ref 15–46)
BACTERIA UR QL AUTO: ABNORMAL /HPF
BASOPHILS # BLD AUTO: 0.06 10*3/MM3 (ref 0–0.2)
BASOPHILS NFR BLD AUTO: 0.7 % (ref 0–2.5)
BILIRUB SERPL-MCNC: 0.8 MG/DL (ref 0.2–1.3)
BILIRUB UR QL STRIP: NEGATIVE
BUN BLD-MCNC: 18 MG/DL (ref 7–20)
BUN/CREAT SERPL: 20 (ref 6.3–21.9)
CALCIUM SPEC-SCNC: 9.6 MG/DL (ref 8.4–10.2)
CHLORIDE SERPL-SCNC: 99 MMOL/L (ref 98–107)
CLARITY UR: ABNORMAL
CO2 SERPL-SCNC: 25 MMOL/L (ref 26–30)
COLOR UR: YELLOW
CREAT BLD-MCNC: 0.9 MG/DL (ref 0.6–1.3)
DEPRECATED RDW RBC AUTO: 46 FL (ref 37–54)
EOSINOPHIL # BLD AUTO: 0.01 10*3/MM3 (ref 0–0.7)
EOSINOPHIL NFR BLD AUTO: 0.1 % (ref 0–7)
ERYTHROCYTE [DISTWIDTH] IN BLOOD BY AUTOMATED COUNT: 13.5 % (ref 11.5–14.5)
FLUAV AG NPH QL: NEGATIVE
FLUBV AG NPH QL IA: NEGATIVE
GFR SERPL CREATININE-BSD FRML MDRD: 88 ML/MIN/1.73
GLOBULIN UR ELPH-MCNC: 3.9 GM/DL
GLUCOSE BLD-MCNC: 167 MG/DL (ref 74–98)
GLUCOSE UR STRIP-MCNC: NEGATIVE MG/DL
HCT VFR BLD AUTO: 53.3 % (ref 42–52)
HGB BLD-MCNC: 18.1 G/DL (ref 14–18)
HGB UR QL STRIP.AUTO: ABNORMAL
HYALINE CASTS UR QL AUTO: ABNORMAL /LPF
IMM GRANULOCYTES # BLD: 0.09 10*3/MM3 (ref 0–0.06)
IMM GRANULOCYTES NFR BLD: 1 % (ref 0–0.6)
KETONES UR QL STRIP: NEGATIVE
LEUKOCYTE ESTERASE UR QL STRIP.AUTO: ABNORMAL
LIPASE SERPL-CCNC: 47 U/L (ref 23–300)
LYMPHOCYTES # BLD AUTO: 0.76 10*3/MM3 (ref 0.6–3.4)
LYMPHOCYTES NFR BLD AUTO: 8.5 % (ref 10–50)
MCH RBC QN AUTO: 31.2 PG (ref 27–31)
MCHC RBC AUTO-ENTMCNC: 34 G/DL (ref 30–37)
MCV RBC AUTO: 91.7 FL (ref 80–94)
MONOCYTES # BLD AUTO: 0.75 10*3/MM3 (ref 0–0.9)
MONOCYTES NFR BLD AUTO: 8.4 % (ref 0–12)
NEUTROPHILS # BLD AUTO: 7.3 10*3/MM3 (ref 2–6.9)
NEUTROPHILS NFR BLD AUTO: 81.3 % (ref 37–80)
NITRITE UR QL STRIP: NEGATIVE
NRBC BLD MANUAL-RTO: 0 /100 WBC (ref 0–0)
PH UR STRIP.AUTO: <=5 [PH] (ref 5–8)
PLATELET # BLD AUTO: 233 10*3/MM3 (ref 130–400)
PMV BLD AUTO: 9.9 FL (ref 6–12)
POTASSIUM BLD-SCNC: 4 MMOL/L (ref 3.5–5.1)
PROT SERPL-MCNC: 8.7 G/DL (ref 6.3–8.2)
PROT UR QL STRIP: ABNORMAL
RBC # BLD AUTO: 5.81 10*6/MM3 (ref 4.7–6.1)
RBC # UR: ABNORMAL /HPF
REF LAB TEST METHOD: ABNORMAL
SODIUM BLD-SCNC: 139 MMOL/L (ref 137–145)
SP GR UR STRIP: >=1.03 (ref 1–1.03)
SQUAMOUS #/AREA URNS HPF: ABNORMAL /HPF
UROBILINOGEN UR QL STRIP: ABNORMAL
WBC NRBC COR # BLD: 8.97 10*3/MM3 (ref 4.8–10.8)
WBC UR QL AUTO: ABNORMAL /HPF

## 2017-04-13 PROCEDURE — 81001 URINALYSIS AUTO W/SCOPE: CPT

## 2017-04-13 PROCEDURE — 87077 CULTURE AEROBIC IDENTIFY: CPT

## 2017-04-13 PROCEDURE — 25010000002 KETOROLAC TROMETHAMINE PER 15 MG: Performed by: PHYSICIAN ASSISTANT

## 2017-04-13 PROCEDURE — 87804 INFLUENZA ASSAY W/OPTIC: CPT | Performed by: PHYSICIAN ASSISTANT

## 2017-04-13 PROCEDURE — 87147 CULTURE TYPE IMMUNOLOGIC: CPT

## 2017-04-13 PROCEDURE — 25010000002 CEFTRIAXONE: Performed by: PHYSICIAN ASSISTANT

## 2017-04-13 PROCEDURE — 87086 URINE CULTURE/COLONY COUNT: CPT

## 2017-04-13 PROCEDURE — 96375 TX/PRO/DX INJ NEW DRUG ADDON: CPT

## 2017-04-13 PROCEDURE — 25010000002 ONDANSETRON PER 1 MG: Performed by: PHYSICIAN ASSISTANT

## 2017-04-13 PROCEDURE — 83690 ASSAY OF LIPASE: CPT | Performed by: PHYSICIAN ASSISTANT

## 2017-04-13 PROCEDURE — 96361 HYDRATE IV INFUSION ADD-ON: CPT

## 2017-04-13 PROCEDURE — 96365 THER/PROPH/DIAG IV INF INIT: CPT

## 2017-04-13 PROCEDURE — 85025 COMPLETE CBC W/AUTO DIFF WBC: CPT | Performed by: PHYSICIAN ASSISTANT

## 2017-04-13 PROCEDURE — 87186 SC STD MICRODIL/AGAR DIL: CPT

## 2017-04-13 PROCEDURE — 80053 COMPREHEN METABOLIC PANEL: CPT | Performed by: PHYSICIAN ASSISTANT

## 2017-04-13 PROCEDURE — 99284 EMERGENCY DEPT VISIT MOD MDM: CPT

## 2017-04-13 PROCEDURE — 74176 CT ABD & PELVIS W/O CONTRAST: CPT

## 2017-04-13 RX ORDER — SODIUM CHLORIDE 0.9 % (FLUSH) 0.9 %
10 SYRINGE (ML) INJECTION AS NEEDED
Status: DISCONTINUED | OUTPATIENT
Start: 2017-04-13 | End: 2017-04-13 | Stop reason: HOSPADM

## 2017-04-13 RX ORDER — KETOROLAC TROMETHAMINE 10 MG/1
10 TABLET, FILM COATED ORAL EVERY 6 HOURS PRN
Qty: 12 TABLET | Refills: 0 | Status: SHIPPED | OUTPATIENT
Start: 2017-04-13 | End: 2017-04-26 | Stop reason: HOSPADM

## 2017-04-13 RX ORDER — CEPHALEXIN 500 MG/1
500 CAPSULE ORAL 3 TIMES DAILY
Qty: 30 CAPSULE | Refills: 0 | Status: SHIPPED | OUTPATIENT
Start: 2017-04-13 | End: 2017-04-14

## 2017-04-13 RX ORDER — PHENAZOPYRIDINE HYDROCHLORIDE 200 MG/1
200 TABLET, FILM COATED ORAL 3 TIMES DAILY PRN
Qty: 6 TABLET | Refills: 0 | Status: SHIPPED | OUTPATIENT
Start: 2017-04-13 | End: 2017-04-26 | Stop reason: HOSPADM

## 2017-04-13 RX ORDER — IBUPROFEN 800 MG/1
800 TABLET ORAL ONCE
Status: DISCONTINUED | OUTPATIENT
Start: 2017-04-13 | End: 2017-04-13

## 2017-04-13 RX ORDER — ONDANSETRON 2 MG/ML
4 INJECTION INTRAMUSCULAR; INTRAVENOUS ONCE
Status: COMPLETED | OUTPATIENT
Start: 2017-04-13 | End: 2017-04-13

## 2017-04-13 RX ORDER — KETOROLAC TROMETHAMINE 30 MG/ML
30 INJECTION, SOLUTION INTRAMUSCULAR; INTRAVENOUS ONCE
Status: COMPLETED | OUTPATIENT
Start: 2017-04-13 | End: 2017-04-13

## 2017-04-13 RX ORDER — ACETAMINOPHEN 500 MG
1000 TABLET ORAL ONCE
Status: COMPLETED | OUTPATIENT
Start: 2017-04-13 | End: 2017-04-13

## 2017-04-13 RX ORDER — ONDANSETRON 4 MG/1
4 TABLET, FILM COATED ORAL EVERY 6 HOURS PRN
Qty: 10 TABLET | Refills: 0 | Status: SHIPPED | OUTPATIENT
Start: 2017-04-13 | End: 2017-05-01

## 2017-04-13 RX ADMIN — ACETAMINOPHEN 1000 MG: 500 TABLET, COATED ORAL at 21:20

## 2017-04-13 RX ADMIN — SODIUM CHLORIDE 1000 ML: 9 INJECTION, SOLUTION INTRAVENOUS at 19:07

## 2017-04-13 RX ADMIN — KETOROLAC TROMETHAMINE 30 MG: 30 INJECTION, SOLUTION INTRAMUSCULAR; INTRAVENOUS at 20:32

## 2017-04-13 RX ADMIN — ONDANSETRON 4 MG: 2 INJECTION INTRAMUSCULAR; INTRAVENOUS at 19:07

## 2017-04-13 RX ADMIN — CEFTRIAXONE 1 G: 1 INJECTION, POWDER, FOR SOLUTION INTRAMUSCULAR; INTRAVENOUS at 20:41

## 2017-04-13 NOTE — TELEPHONE ENCOUNTER
----- Message from Catherine Shirley MD sent at 4/13/2017 12:54 PM EDT -----  Contact: Patient  Yes, please tell him to go!    ----- Message -----     From: Supriya Easley MA     Sent: 4/13/2017  12:23 PM       To: Catherine Shirley MD        ----- Message -----     [facility]     Sent: 4/13/2017  10:38 AM       To: Supriya Easley MA    Patient said he's still having horrible groin pain and still has blood in his urine. Woke up at 4am with the worst pain.    He's wondering if she should go to the ER.    He'd like a callback.

## 2017-04-13 NOTE — TELEPHONE ENCOUNTER
Spoke with pt, he is still having flecks of dried blood in urine and urine is discolored. Pain has eased up a bit since this am, but he is really nauseous now. Went over CT result with him. Advised him to go to ED tonight. CT is WNL, but he is still having sx's and now nausea and not feeling well at all. Pt agrees (his wife will have to drive him when she gets home). Advised him we will keep in touch and keep track of ED notes and any tests he has done.

## 2017-04-13 NOTE — ED PROVIDER NOTES
Subjective   HPI Comments: 56-year-old male here with a 3 day history of bilateral flank pain into the suprapubic region with dysuria, frequency and fever.  Also has some URI symptoms and a headache.  Had a CT scan of his abdomen and pelvis performed today as an outpatient that was read as negative by the radiologist(I have reviewed the report myself).  Has been taking ibuprofen and Tylenol at home.  Remote history of kidney stones.  Remote history of kidney infections as well.  The pain radiates to both testicles as well.  No testicular swelling noted.    Patient is a 56 y.o. male presenting with flank pain.   History provided by:  Patient  History limited by: nothing.   used: No    Flank Pain   Pain location:  L flank and R flank  Pain quality: aching    Pain radiates to:  Suprapubic region  Pain severity:  Moderate  Onset quality:  Gradual  Duration:  3 days  Timing:  Constant  Progression:  Worsening  Chronicity:  New  Context: not alcohol use    Relieved by:  NSAIDs  Worsened by:  Urination  Ineffective treatments:  Liquids  Associated symptoms: chills, dysuria, fever and hematuria    Associated symptoms: no chest pain    Risk factors: no alcohol abuse        Review of Systems   Constitutional: Positive for chills and fever.   HENT: Positive for rhinorrhea.    Eyes: Negative.    Respiratory: Negative.    Cardiovascular: Negative.  Negative for chest pain.   Gastrointestinal: Positive for abdominal pain.   Endocrine: Negative.    Genitourinary: Positive for dysuria, flank pain and hematuria.   Skin: Negative.    Allergic/Immunologic: Negative.    Neurological: Positive for headaches.   Hematological: Negative.    Psychiatric/Behavioral: Negative.    All other systems reviewed and are negative.      Past Medical History:   Diagnosis Date   • Arthritis    • Asthma    • Diverticulitis    • Diverticulosis    • Gastric ulcer    • Hypertension    • Renal calculi    • Stroke     TIA - slurred  speech, discoordinated       Allergies   Allergen Reactions   • Amoxicillin Rash   • Erythromycin Itching   • Celecoxib    • Citalopram    • Clonidine Derivatives    • Gabapentin    • Hydrocodone GI Intolerance       Past Surgical History:   Procedure Laterality Date   • ABDOMINAL SURGERY     • APPENDECTOMY     • BACK SURGERY     • EYE SURGERY     • HERNIA REPAIR  2011    ventral & hiatal w/ mesh   • LUMBAR FUSION  1999    L3-S1   • TONSILLECTOMY         Family History   Problem Relation Age of Onset   • Hypertension Mother    • Cancer Mother      vulvar   • Diabetes Mother    • Diabetes Father    • Heart attack Father    • Irritable bowel syndrome Father    • Hypertension Father    • Irritable bowel syndrome Sister    • Cancer Brother    • Alcohol abuse Brother    • Prostate cancer Brother 61   • Stomach cancer Maternal Aunt      metastatic   • No Known Problems Maternal Uncle    • No Known Problems Paternal Aunt    • No Known Problems Maternal Grandmother    • Lung cancer Maternal Grandfather    • COPD Maternal Grandfather    • Alcohol abuse Maternal Grandfather    • Hypertension Maternal Grandfather    • Cancer Maternal Grandfather    • Esophageal cancer Paternal Grandmother    • Cancer Paternal Grandmother    • Asthma Sister    • No Known Problems Brother    • No Known Problems Maternal Uncle        Social History     Social History   • Marital status:      Spouse name: N/A   • Number of children: N/A   • Years of education: N/A     Social History Main Topics   • Smoking status: Former Smoker     Packs/day: 0.25     Years: 20.00     Types: Cigarettes   • Smokeless tobacco: Former User     Types: Chew   • Alcohol use 0.6 oz/week     1 Cans of beer per week      Comment: occassional   • Drug use: No   • Sexual activity: Yes     Partners: Female     Other Topics Concern   • None     Social History Narrative   • None           Objective   Physical Exam   Constitutional: He is oriented to person, place, and  time. He appears well-developed and well-nourished. No distress.   HENT:   Head: Normocephalic and atraumatic.   Right Ear: External ear normal.   Left Ear: External ear normal.   Eyes: EOM are normal. Pupils are equal, round, and reactive to light.   Neck: Normal range of motion. Neck supple.   Cardiovascular: Normal rate, regular rhythm and normal heart sounds.    Pulmonary/Chest: Effort normal and breath sounds normal. No stridor. He has no wheezes. He exhibits no tenderness.   Abdominal: Soft. He exhibits no distension. There is no tenderness. There is no rebound and no guarding.   Bilateral CVA tenderness noted.   Genitourinary: Penis normal.   Genitourinary Comments: Normal circumcised penis.  Nontender and nonswollen testicles.   Musculoskeletal: Normal range of motion. He exhibits no edema.   Neurological: He is alert and oriented to person, place, and time.   Skin: Skin is warm and dry. No rash noted. He is not diaphoretic.   Psychiatric: He has a normal mood and affect. His behavior is normal. Judgment and thought content normal.   Nursing note and vitals reviewed.      Procedures         ED Course  ED Course                  MDM  Number of Diagnoses or Management Options  Acute flank pain: new and requires workup  Urinary tract infection, site unspecified: new and requires workup     Amount and/or Complexity of Data Reviewed  Clinical lab tests: reviewed and ordered  Discuss the patient with other providers: yes    Risk of Complications, Morbidity, and/or Mortality  Presenting problems: moderate  Diagnostic procedures: low  Management options: moderate    Patient Progress  Patient progress: stable      Final diagnoses:   Acute flank pain   Urinary tract infection, site unspecified            Alba Post PA-C  04/13/17 5623

## 2017-04-14 ENCOUNTER — TELEPHONE (OUTPATIENT)
Dept: INTERNAL MEDICINE | Facility: CLINIC | Age: 57
End: 2017-04-14

## 2017-04-14 LAB
HOLD SPECIMEN: NORMAL
WHOLE BLOOD HOLD SPECIMEN: NORMAL
WHOLE BLOOD HOLD SPECIMEN: NORMAL

## 2017-04-14 RX ORDER — CIPROFLOXACIN 500 MG/1
500 TABLET, FILM COATED ORAL 2 TIMES DAILY
Qty: 28 TABLET | Refills: 0 | Status: SHIPPED | OUTPATIENT
Start: 2017-04-14 | End: 2017-04-26 | Stop reason: HOSPADM

## 2017-04-14 NOTE — DISCHARGE INSTRUCTIONS
Increase fluids.  Lots of cranberry juice.  Return to the ER for worsening pain, high fevers, vomiting, new or worsening symptoms.  Follow-up with your doctor and Dr. Wells, urologist, 1-2 days for further workup, treatment and evaluation.

## 2017-04-14 NOTE — TELEPHONE ENCOUNTER
Dr. Shirley reviewed ED note from last night. Per her v/o, d/c Cephalexin and send in rx for Cipro 500 mg BID x 14 days. Pt notified, rx sent to [facility] at pt request.

## 2017-04-14 NOTE — TELEPHONE ENCOUNTER
----- Message from Catherine Shirley MD sent at 4/14/2017  9:06 AM EDT -----  Can you call and check on him today?

## 2017-04-14 NOTE — TELEPHONE ENCOUNTER
Spoke with pt after we reviewed ED note. He had not started Keflex yet. I sent in Cipro to [facility]. He is scheduled for f/u with Dr. Lu on Wed 4-19-17.

## 2017-04-16 ENCOUNTER — HOSPITAL ENCOUNTER (EMERGENCY)
Facility: HOSPITAL | Age: 57
Discharge: HOME | End: 2017-04-16
Attending: EMERGENCY MEDICINE | Admitting: EMERGENCY MEDICINE

## 2017-04-16 ENCOUNTER — APPOINTMENT (OUTPATIENT)
Dept: GENERAL RADIOLOGY | Facility: HOSPITAL | Age: 57
End: 2017-04-16

## 2017-04-16 VITALS
DIASTOLIC BLOOD PRESSURE: 76 MMHG | OXYGEN SATURATION: 90 % | TEMPERATURE: 98.1 F | RESPIRATION RATE: 22 BRPM | SYSTOLIC BLOOD PRESSURE: 116 MMHG | BODY MASS INDEX: 32.93 KG/M2 | HEIGHT: 70 IN | HEART RATE: 79 BPM | WEIGHT: 230 LBS

## 2017-04-16 DIAGNOSIS — M25.512 ACUTE PAIN OF LEFT SHOULDER: Primary | ICD-10-CM

## 2017-04-16 DIAGNOSIS — M75.02 ADHESIVE CAPSULITIS OF LEFT SHOULDER: ICD-10-CM

## 2017-04-16 LAB
ALBUMIN SERPL-MCNC: 4.3 G/DL (ref 3.2–4.8)
ALBUMIN/GLOB SERPL: 1.2 G/DL (ref 1.5–2.5)
ALP SERPL-CCNC: 96 U/L (ref 25–100)
ALT SERPL W P-5'-P-CCNC: 85 U/L (ref 7–40)
ANION GAP SERPL CALCULATED.3IONS-SCNC: 8 MMOL/L (ref 3–11)
AST SERPL-CCNC: 63 U/L (ref 0–33)
BACTERIA SPEC AEROBE CULT: ABNORMAL
BASOPHILS # BLD AUTO: 0.02 10*3/MM3 (ref 0–0.2)
BASOPHILS NFR BLD AUTO: 0.2 % (ref 0–1)
BILIRUB SERPL-MCNC: 0.4 MG/DL (ref 0.3–1.2)
BNP SERPL-MCNC: 27 PG/ML (ref 0–100)
BUN BLD-MCNC: 10 MG/DL (ref 9–23)
BUN/CREAT SERPL: 12.5 (ref 7–25)
CALCIUM SPEC-SCNC: 9.4 MG/DL (ref 8.7–10.4)
CHLORIDE SERPL-SCNC: 96 MMOL/L (ref 99–109)
CO2 SERPL-SCNC: 29 MMOL/L (ref 20–31)
CREAT BLD-MCNC: 0.8 MG/DL (ref 0.6–1.3)
DEPRECATED RDW RBC AUTO: 49.2 FL (ref 37–54)
EOSINOPHIL # BLD AUTO: 0.08 10*3/MM3 (ref 0.1–0.3)
EOSINOPHIL NFR BLD AUTO: 0.6 % (ref 0–3)
ERYTHROCYTE [DISTWIDTH] IN BLOOD BY AUTOMATED COUNT: 13.9 % (ref 11.3–14.5)
GFR SERPL CREATININE-BSD FRML MDRD: 101 ML/MIN/1.73
GLOBULIN UR ELPH-MCNC: 3.6 GM/DL
GLUCOSE BLD-MCNC: 160 MG/DL (ref 70–100)
HCT VFR BLD AUTO: 49.8 % (ref 38.9–50.9)
HGB BLD-MCNC: 16.5 G/DL (ref 13.1–17.5)
HOLD SPECIMEN: NORMAL
HOLD SPECIMEN: NORMAL
IMM GRANULOCYTES # BLD: 0.08 10*3/MM3 (ref 0–0.03)
IMM GRANULOCYTES NFR BLD: 0.6 % (ref 0–0.6)
LIPASE SERPL-CCNC: 51 U/L (ref 6–51)
LYMPHOCYTES # BLD AUTO: 1.39 10*3/MM3 (ref 0.6–4.8)
LYMPHOCYTES NFR BLD AUTO: 10.7 % (ref 24–44)
MCH RBC QN AUTO: 31.4 PG (ref 27–31)
MCHC RBC AUTO-ENTMCNC: 33.1 G/DL (ref 32–36)
MCV RBC AUTO: 94.9 FL (ref 80–99)
MONOCYTES # BLD AUTO: 1.33 10*3/MM3 (ref 0–1)
MONOCYTES NFR BLD AUTO: 10.2 % (ref 0–12)
NEUTROPHILS # BLD AUTO: 10.11 10*3/MM3 (ref 1.5–8.3)
NEUTROPHILS NFR BLD AUTO: 77.7 % (ref 41–71)
PLATELET # BLD AUTO: 197 10*3/MM3 (ref 150–450)
PMV BLD AUTO: 10.9 FL (ref 6–12)
POTASSIUM BLD-SCNC: 4.1 MMOL/L (ref 3.5–5.5)
PROT SERPL-MCNC: 7.9 G/DL (ref 5.7–8.2)
RBC # BLD AUTO: 5.25 10*6/MM3 (ref 4.2–5.76)
SODIUM BLD-SCNC: 133 MMOL/L (ref 132–146)
TROPONIN I SERPL-MCNC: 0.01 NG/ML (ref 0–0.07)
WBC NRBC COR # BLD: 13.01 10*3/MM3 (ref 3.5–10.8)
WHOLE BLOOD HOLD SPECIMEN: NORMAL
WHOLE BLOOD HOLD SPECIMEN: NORMAL

## 2017-04-16 PROCEDURE — 83690 ASSAY OF LIPASE: CPT | Performed by: EMERGENCY MEDICINE

## 2017-04-16 PROCEDURE — 80053 COMPREHEN METABOLIC PANEL: CPT | Performed by: EMERGENCY MEDICINE

## 2017-04-16 PROCEDURE — 96375 TX/PRO/DX INJ NEW DRUG ADDON: CPT

## 2017-04-16 PROCEDURE — 85025 COMPLETE CBC W/AUTO DIFF WBC: CPT | Performed by: EMERGENCY MEDICINE

## 2017-04-16 PROCEDURE — 73030 X-RAY EXAM OF SHOULDER: CPT

## 2017-04-16 PROCEDURE — 63710000001 PREDNISONE PER 1 MG: Performed by: EMERGENCY MEDICINE

## 2017-04-16 PROCEDURE — 93005 ELECTROCARDIOGRAM TRACING: CPT | Performed by: EMERGENCY MEDICINE

## 2017-04-16 PROCEDURE — 71020 HC CHEST PA AND LATERAL: CPT

## 2017-04-16 PROCEDURE — 84484 ASSAY OF TROPONIN QUANT: CPT

## 2017-04-16 PROCEDURE — 99284 EMERGENCY DEPT VISIT MOD MDM: CPT

## 2017-04-16 PROCEDURE — 83880 ASSAY OF NATRIURETIC PEPTIDE: CPT | Performed by: EMERGENCY MEDICINE

## 2017-04-16 PROCEDURE — 25010000002 KETOROLAC TROMETHAMINE PER 15 MG: Performed by: EMERGENCY MEDICINE

## 2017-04-16 PROCEDURE — 25010000002 DIAZEPAM PER 5 MG: Performed by: EMERGENCY MEDICINE

## 2017-04-16 PROCEDURE — 96374 THER/PROPH/DIAG INJ IV PUSH: CPT

## 2017-04-16 RX ORDER — DIAZEPAM 5 MG/ML
5 INJECTION, SOLUTION INTRAMUSCULAR; INTRAVENOUS ONCE
Status: COMPLETED | OUTPATIENT
Start: 2017-04-16 | End: 2017-04-16

## 2017-04-16 RX ORDER — DIAZEPAM 5 MG/ML
5 INJECTION, SOLUTION INTRAMUSCULAR; INTRAVENOUS ONCE
Status: DISCONTINUED | OUTPATIENT
Start: 2017-04-16 | End: 2017-04-16

## 2017-04-16 RX ORDER — PREDNISONE 20 MG/1
60 TABLET ORAL ONCE
Status: COMPLETED | OUTPATIENT
Start: 2017-04-16 | End: 2017-04-16

## 2017-04-16 RX ORDER — KETOROLAC TROMETHAMINE 15 MG/ML
15 INJECTION, SOLUTION INTRAMUSCULAR; INTRAVENOUS ONCE
Status: COMPLETED | OUTPATIENT
Start: 2017-04-16 | End: 2017-04-16

## 2017-04-16 RX ORDER — SODIUM CHLORIDE 0.9 % (FLUSH) 0.9 %
10 SYRINGE (ML) INJECTION AS NEEDED
Status: DISCONTINUED | OUTPATIENT
Start: 2017-04-16 | End: 2017-04-16 | Stop reason: HOSPADM

## 2017-04-16 RX ORDER — OXYCODONE AND ACETAMINOPHEN 10; 325 MG/1; MG/1
1 TABLET ORAL ONCE
Status: COMPLETED | OUTPATIENT
Start: 2017-04-16 | End: 2017-04-16

## 2017-04-16 RX ORDER — CYCLOBENZAPRINE HCL 10 MG
10 TABLET ORAL 3 TIMES DAILY PRN
Qty: 12 TABLET | Refills: 0 | Status: SHIPPED | OUTPATIENT
Start: 2017-04-16 | End: 2017-04-26 | Stop reason: HOSPADM

## 2017-04-16 RX ORDER — OXYCODONE HYDROCHLORIDE AND ACETAMINOPHEN 5; 325 MG/1; MG/1
1-2 TABLET ORAL EVERY 4 HOURS PRN
Qty: 12 TABLET | Refills: 0 | Status: SHIPPED | OUTPATIENT
Start: 2017-04-16 | End: 2017-04-26 | Stop reason: HOSPADM

## 2017-04-16 RX ADMIN — PREDNISONE 60 MG: 20 TABLET ORAL at 02:54

## 2017-04-16 RX ADMIN — KETOROLAC TROMETHAMINE 15 MG: 15 INJECTION, SOLUTION INTRAMUSCULAR; INTRAVENOUS at 04:59

## 2017-04-16 RX ADMIN — OXYCODONE HYDROCHLORIDE AND ACETAMINOPHEN 1 TABLET: 10; 325 TABLET ORAL at 02:53

## 2017-04-16 RX ADMIN — DIAZEPAM 5 MG: 5 INJECTION, SOLUTION INTRAMUSCULAR; INTRAVENOUS at 03:04

## 2017-04-16 NOTE — ED PROVIDER NOTES
Subjective   HPI Comments: Demi Meyer is a 56 y.o.male who presents to the ED with c/o left shoulder pain. He states that yesterday upon waking, he began having episodes of pain that would last roughly one minute before improving with severe pain to his shoulder that would occasionally radiate to his neck. He states that his pain has been waxing and waning and then when it continued today, he came to the ED for evaluation. He denies any numbness, weakness, acute trauma or other acute complaints. Additionally he was seen in the ED for a UTI 3 days ago.   Patient is a 56 y.o. male presenting with upper extremity pain.   History provided by:  Patient  Upper Extremity Issue   Location:  Shoulder  Shoulder location:  L shoulder  Injury: no    Pain details:     Quality:  Aching    Radiates to: neck.    Severity:  Severe    Onset quality:  Sudden    Duration:  1 day    Timing:  Intermittent    Progression:  Waxing and waning  Dislocation: no    Relieved by:  Nothing  Worsened by:  Nothing  Ineffective treatments: NSAIDs.  Associated symptoms: no fever        Review of Systems   Constitutional: Negative for chills and fever.   Respiratory: Negative for shortness of breath.    Cardiovascular: Positive for chest pain.   All other systems reviewed and are negative.      Past Medical History:   Diagnosis Date   • Arthritis    • Asthma    • Diverticulitis    • Diverticulosis    • Gastric ulcer    • Hypertension    • Renal calculi    • Stroke     TIA - slurred speech, discoordinated       Allergies   Allergen Reactions   • Amoxicillin Rash   • Erythromycin Itching   • Celecoxib    • Citalopram    • Clonidine Derivatives    • Gabapentin    • Hydrocodone GI Intolerance       Past Surgical History:   Procedure Laterality Date   • ABDOMINAL SURGERY     • APPENDECTOMY     • BACK SURGERY     • EYE SURGERY     • HERNIA REPAIR  2011    ventral & hiatal w/ mesh   • LUMBAR FUSION  1999    L3-S1   • TONSILLECTOMY         Family History    Problem Relation Age of Onset   • Hypertension Mother    • Cancer Mother      vulvar   • Diabetes Mother    • Diabetes Father    • Heart attack Father    • Irritable bowel syndrome Father    • Hypertension Father    • Irritable bowel syndrome Sister    • Cancer Brother    • Alcohol abuse Brother    • Prostate cancer Brother 61   • Stomach cancer Maternal Aunt      metastatic   • No Known Problems Maternal Uncle    • No Known Problems Paternal Aunt    • No Known Problems Maternal Grandmother    • Lung cancer Maternal Grandfather    • COPD Maternal Grandfather    • Alcohol abuse Maternal Grandfather    • Hypertension Maternal Grandfather    • Cancer Maternal Grandfather    • Esophageal cancer Paternal Grandmother    • Cancer Paternal Grandmother    • Asthma Sister    • No Known Problems Brother    • No Known Problems Maternal Uncle        Social History     Social History   • Marital status:      Spouse name: N/A   • Number of children: N/A   • Years of education: N/A     Social History Main Topics   • Smoking status: Former Smoker     Packs/day: 0.25     Years: 20.00     Types: Cigarettes   • Smokeless tobacco: Former User     Types: Chew   • Alcohol use 0.6 oz/week     1 Cans of beer per week      Comment: occassional   • Drug use: No   • Sexual activity: Yes     Partners: Female     Other Topics Concern   • None     Social History Narrative         Objective   Physical Exam   Constitutional: He is oriented to person, place, and time. He appears well-developed and well-nourished. No distress.   HENT:   Head: Normocephalic and atraumatic.   Eyes: Conjunctivae are normal. No scleral icterus.   Neck: Normal range of motion. Neck supple.   Cardiovascular: Normal rate, regular rhythm and normal heart sounds.    Pulmonary/Chest: Effort normal and breath sounds normal. No respiratory distress.   Abdominal: Soft. There is no tenderness.   Musculoskeletal: Normal range of motion. He exhibits tenderness. He exhibits  no edema.   Significant Reproducible TTP AC joint along with flexion/extension of the left shoulder.      Lymphadenopathy:     He has no cervical adenopathy.   Neurological: He is alert and oriented to person, place, and time.   Skin: Skin is warm and dry. No rash noted. He is not diaphoretic.   No lymphadenopathy appreciated.    Psychiatric: He has a normal mood and affect. His behavior is normal.   Nursing note and vitals reviewed.      Procedures         ED Course  ED Course     No results found for this or any previous visit (from the past 24 hour(s)).  Note: In addition to lab results from this visit, the labs listed above may include labs taken at another facility or during a different encounter within the last 24 hours. Please correlate lab times with ED admission and discharge times for further clarification of the services performed during this visit.    XR Chest 2 View   Preliminary Result   Stable chest. No acute cardiopulmonary disease.       FAX TO: A       DICTATED:     04/16/2017   EDITED:         04/16/2017              XR Shoulder 2+ View Left   Preliminary Result   No acute bony abnormality identified.       FAX TO: A       DICTATED:     04/16/2017   EDITED:         04/16/2017                Vitals:    04/16/17 0357 04/16/17 0400 04/16/17 0404 04/16/17 0430   BP:  112/73  116/76   BP Location:       Patient Position:       Pulse:       Resp:       Temp:       TempSrc:       SpO2: 92% 93% 95% 90%   Weight:       Height:         Medications   oxyCODONE-acetaminophen (PERCOCET)  MG per tablet 1 tablet (1 tablet Oral Given 4/16/17 0253)   predniSONE (DELTASONE) tablet 60 mg (60 mg Oral Given 4/16/17 0254)   diazePAM (VALIUM) injection 5 mg (5 mg Intravenous Given 4/16/17 9947)   ketorolac (TORADOL) injection 15 mg (15 mg Intravenous Given 4/16/17 7464)     ECG/EMG Results (last 24 hours)     ** No results found for the last 24 hours. **                        MDM    Final diagnoses:   Acute pain  of left shoulder   Adhesive capsulitis of left shoulder       Documentation assistance provided by rachell Cole.  Information recorded by the scriblong was done at my direction and has been verified and validated by me.     Luis Miguel Cole  04/16/17 0246       Alli Paredes DO  04/17/17 0329

## 2017-04-17 ENCOUNTER — TELEPHONE (OUTPATIENT)
Dept: INTERNAL MEDICINE | Facility: CLINIC | Age: 57
End: 2017-04-17

## 2017-04-17 DIAGNOSIS — M75.02 ADHESIVE CAPSULITIS OF LEFT SHOULDER: Primary | ICD-10-CM

## 2017-04-17 NOTE — TELEPHONE ENCOUNTER
----- Message from Kinza Diego sent at 4/17/2017  8:59 AM EDT -----  Contact: PATIENT   PLEASE RETURN CALL. PATIENT WOULD LIKE TO KNOW IF DR. LOPEZ CAN SEND OVER A REFERRAL TODAY FOR DR. COUCH (ORTHO SURGEON)?

## 2017-04-17 NOTE — TELEPHONE ENCOUNTER
Went to [facility] ED yesterday for left shoulder pain. Dx'd with adhesive capsulitis of left shoulder, and was told to follow up visit with Ortho today. ED said Dr. Carrasco. If you have someone else in mind that would be fine, per pt. Referral entered/pending.

## 2017-04-18 ENCOUNTER — APPOINTMENT (OUTPATIENT)
Dept: CT IMAGING | Facility: HOSPITAL | Age: 57
End: 2017-04-18

## 2017-04-18 ENCOUNTER — APPOINTMENT (OUTPATIENT)
Dept: LAB | Facility: HOSPITAL | Age: 57
End: 2017-04-18

## 2017-04-18 ENCOUNTER — HOSPITAL ENCOUNTER (INPATIENT)
Facility: HOSPITAL | Age: 57
LOS: 8 days | Discharge: HOME HEALTH | End: 2017-04-26
Attending: FAMILY MEDICINE | Admitting: FAMILY MEDICINE

## 2017-04-18 ENCOUNTER — OFFICE VISIT (OUTPATIENT)
Dept: ORTHOPEDIC SURGERY | Facility: CLINIC | Age: 57
End: 2017-04-18

## 2017-04-18 VITALS — TEMPERATURE: 98.8 F | WEIGHT: 229.9 LBS | RESPIRATION RATE: 18 BRPM | HEIGHT: 70 IN | BODY MASS INDEX: 32.91 KG/M2

## 2017-04-18 DIAGNOSIS — J18.9 PNEUMONIA DUE TO INFECTIOUS ORGANISM, UNSPECIFIED LATERALITY, UNSPECIFIED PART OF LUNG: ICD-10-CM

## 2017-04-18 DIAGNOSIS — A41.9 SEPSIS, DUE TO UNSPECIFIED ORGANISM: Primary | ICD-10-CM

## 2017-04-18 DIAGNOSIS — R79.82 ELEVATED C-REACTIVE PROTEIN (CRP): ICD-10-CM

## 2017-04-18 DIAGNOSIS — Z87.440 HX: UTI (URINARY TRACT INFECTION): ICD-10-CM

## 2017-04-18 DIAGNOSIS — M25.512 ACUTE PAIN OF LEFT SHOULDER: Primary | ICD-10-CM

## 2017-04-18 DIAGNOSIS — M25.571 ARTHRALGIA OF RIGHT ANKLE: ICD-10-CM

## 2017-04-18 DIAGNOSIS — N17.9 ACUTE RENAL FAILURE, UNSPECIFIED ACUTE RENAL FAILURE TYPE (HCC): ICD-10-CM

## 2017-04-18 DIAGNOSIS — Z74.09 IMPAIRED FUNCTIONAL MOBILITY, BALANCE, GAIT, AND ENDURANCE: ICD-10-CM

## 2017-04-18 DIAGNOSIS — R79.89 ELEVATED SERUM CREATININE: ICD-10-CM

## 2017-04-18 DIAGNOSIS — D72.829 LEUKOCYTOSIS, UNSPECIFIED TYPE: ICD-10-CM

## 2017-04-18 PROBLEM — J45.20 MILD INTERMITTENT ASTHMA WITHOUT COMPLICATION: Status: ACTIVE | Noted: 2017-04-18

## 2017-04-18 PROBLEM — E87.1 DEHYDRATION WITH HYPONATREMIA: Status: ACTIVE | Noted: 2017-04-18

## 2017-04-18 PROBLEM — M10.9 ACUTE GOUT OF RIGHT ANKLE: Status: ACTIVE | Noted: 2017-04-18

## 2017-04-18 PROBLEM — A41.02 SEVERE SEPSIS DUE TO METHICILLIN RESISTANT STAPHYLOCOCCUS AUREUS (MRSA) WITH ACUTE ORGAN DYSFUNCTION (HCC): Status: ACTIVE | Noted: 2017-04-18

## 2017-04-18 PROBLEM — A49.9 UTI (URINARY TRACT INFECTION), BACTERIAL: Status: ACTIVE | Noted: 2017-04-18

## 2017-04-18 PROBLEM — E86.0 DEHYDRATION WITH HYPONATREMIA: Status: ACTIVE | Noted: 2017-04-18

## 2017-04-18 PROBLEM — M25.519 SHOULDER PAIN: Status: ACTIVE | Noted: 2017-04-18

## 2017-04-18 PROBLEM — R65.20 SEVERE SEPSIS DUE TO METHICILLIN RESISTANT STAPHYLOCOCCUS AUREUS (MRSA) WITH ACUTE ORGAN DYSFUNCTION (HCC): Status: ACTIVE | Noted: 2017-04-18

## 2017-04-18 PROBLEM — R10.30 ABDOMINAL PAIN, LOWER: Status: ACTIVE | Noted: 2017-04-18

## 2017-04-18 PROBLEM — N39.0 UTI (URINARY TRACT INFECTION), BACTERIAL: Status: ACTIVE | Noted: 2017-04-18

## 2017-04-18 PROBLEM — J01.00 ACUTE MAXILLARY SINUSITIS: Status: ACTIVE | Noted: 2017-04-18

## 2017-04-18 LAB
ALBUMIN SERPL-MCNC: 4.2 G/DL (ref 3.5–5)
ALBUMIN/GLOB SERPL: 1 G/DL (ref 1–2)
ALP SERPL-CCNC: 128 U/L (ref 38–126)
ALT SERPL W P-5'-P-CCNC: 66 U/L (ref 13–69)
ANION GAP SERPL CALCULATED.3IONS-SCNC: 16.9 MMOL/L
ANION GAP SERPL CALCULATED.3IONS-SCNC: 20.4 MMOL/L
ARTERIAL PATENCY WRIST A: POSITIVE
AST SERPL-CCNC: 39 U/L (ref 15–46)
BACTERIA UR QL AUTO: ABNORMAL /HPF
BASE EXCESS BLDA CALC-SCNC: 5.6 MMOL/L
BASOPHILS # BLD AUTO: 0.1 10*3/MM3 (ref 0–0.2)
BASOPHILS # BLD AUTO: 0.17 10*3/MM3 (ref 0–0.2)
BASOPHILS NFR BLD AUTO: 0.5 % (ref 0–2.5)
BASOPHILS NFR BLD AUTO: 0.7 % (ref 0–2.5)
BDY SITE: ABNORMAL
BILIRUB SERPL-MCNC: 1.2 MG/DL (ref 0.2–1.3)
BILIRUB UR QL STRIP: NEGATIVE
BUN BLD-MCNC: 18 MG/DL (ref 7–20)
BUN BLD-MCNC: 22 MG/DL (ref 7–20)
BUN/CREAT SERPL: 10.6 (ref 6.3–21.9)
BUN/CREAT SERPL: 12.9 (ref 6.3–21.9)
CALCIUM SPEC-SCNC: 8.9 MG/DL (ref 8.4–10.2)
CALCIUM SPEC-SCNC: 9 MG/DL (ref 8.4–10.2)
CHLORIDE SERPL-SCNC: 90 MMOL/L (ref 98–107)
CHLORIDE SERPL-SCNC: 90 MMOL/L (ref 98–107)
CK SERPL-CCNC: 72 U/L (ref 30–170)
CLARITY UR: CLEAR
CO2 SERPL-SCNC: 27 MMOL/L (ref 26–30)
CO2 SERPL-SCNC: 33 MMOL/L (ref 26–30)
COHGB MFR BLD: 1.7 %
COLOR UR: YELLOW
CREAT BLD-MCNC: 1.7 MG/DL (ref 0.6–1.3)
CREAT BLD-MCNC: 1.7 MG/DL (ref 0.6–1.3)
CRP SERPL-MCNC: 56.9 MG/DL (ref 0–1)
D-LACTATE SERPL-SCNC: 1.8 MMOL/L (ref 0.5–2)
D-LACTATE SERPL-SCNC: 2.1 MMOL/L (ref 0.5–2)
DEPRECATED RDW RBC AUTO: 47.6 FL (ref 37–54)
DEPRECATED RDW RBC AUTO: 48.1 FL (ref 37–54)
EOSINOPHIL # BLD AUTO: 0.01 10*3/MM3 (ref 0–0.7)
EOSINOPHIL # BLD AUTO: 0.04 10*3/MM3 (ref 0–0.7)
EOSINOPHIL NFR BLD AUTO: 0 % (ref 0–7)
EOSINOPHIL NFR BLD AUTO: 0.2 % (ref 0–7)
ERYTHROCYTE [DISTWIDTH] IN BLOOD BY AUTOMATED COUNT: 14.1 % (ref 11.5–14.5)
ERYTHROCYTE [DISTWIDTH] IN BLOOD BY AUTOMATED COUNT: 14.3 % (ref 11.5–14.5)
ERYTHROCYTE [SEDIMENTATION RATE] IN BLOOD: 64 MM/HR (ref 0–15)
FLUAV AG NPH QL: NEGATIVE
FLUBV AG NPH QL IA: NEGATIVE
GFR SERPL CREATININE-BSD FRML MDRD: 42 ML/MIN/1.73
GFR SERPL CREATININE-BSD FRML MDRD: 42 ML/MIN/1.73
GLOBULIN UR ELPH-MCNC: 4.4 GM/DL
GLUCOSE BLD-MCNC: 150 MG/DL (ref 74–98)
GLUCOSE BLD-MCNC: 160 MG/DL (ref 74–98)
GLUCOSE BLDC GLUCOMTR-MCNC: 158 MG/DL (ref 70–130)
GLUCOSE UR STRIP-MCNC: NEGATIVE MG/DL
GRAN CASTS URNS QL MICRO: ABNORMAL /LPF
HCO3 BLDA-SCNC: 28.1 MMOL/L (ref 22–28)
HCT VFR BLD AUTO: 48.4 % (ref 42–52)
HCT VFR BLD AUTO: 49.3 % (ref 42–52)
HGB BLD-MCNC: 15.9 G/DL (ref 14–18)
HGB BLD-MCNC: 16.6 G/DL (ref 14–18)
HGB BLDA-MCNC: 15.5 G/DL (ref 12–18)
HGB UR QL STRIP.AUTO: ABNORMAL
HOLD SPECIMEN: NORMAL
HOLD SPECIMEN: NORMAL
HOROWITZ INDEX BLD+IHG-RTO: 21 %
HYALINE CASTS UR QL AUTO: ABNORMAL /LPF
IMM GRANULOCYTES # BLD: 0.27 10*3/MM3 (ref 0–0.06)
IMM GRANULOCYTES # BLD: 0.6 10*3/MM3 (ref 0–0.06)
IMM GRANULOCYTES NFR BLD: 1.2 % (ref 0–0.6)
IMM GRANULOCYTES NFR BLD: 2.6 % (ref 0–0.6)
INR PPP: 1.49 (ref 0.9–1.1)
KETONES UR QL STRIP: NEGATIVE
LEUKOCYTE ESTERASE UR QL STRIP.AUTO: NEGATIVE
LYMPHOCYTES # BLD AUTO: 1.01 10*3/MM3 (ref 0.6–3.4)
LYMPHOCYTES # BLD AUTO: 1.21 10*3/MM3 (ref 0.6–3.4)
LYMPHOCYTES # BLD MANUAL: 1.6 10*3/MM3 (ref 0.6–3.4)
LYMPHOCYTES NFR BLD AUTO: 4.4 % (ref 10–50)
LYMPHOCYTES NFR BLD AUTO: 5.5 % (ref 10–50)
LYMPHOCYTES NFR BLD MANUAL: 7 % (ref 0–12)
LYMPHOCYTES NFR BLD MANUAL: 7 % (ref 10–50)
MAGNESIUM SERPL-MCNC: 2.2 MG/DL (ref 1.6–2.3)
MCH RBC QN AUTO: 30.4 PG (ref 27–31)
MCH RBC QN AUTO: 30.7 PG (ref 27–31)
MCHC RBC AUTO-ENTMCNC: 32.9 G/DL (ref 30–37)
MCHC RBC AUTO-ENTMCNC: 33.7 G/DL (ref 30–37)
MCV RBC AUTO: 91.1 FL (ref 80–94)
MCV RBC AUTO: 92.5 FL (ref 80–94)
METHGB BLD QL: 0.4 %
MODALITY: ABNORMAL
MONOCYTES # BLD AUTO: 1.5 10*3/MM3 (ref 0–0.9)
MONOCYTES # BLD AUTO: 1.6 10*3/MM3 (ref 0–0.9)
MONOCYTES # BLD AUTO: 1.82 10*3/MM3 (ref 0–0.9)
MONOCYTES NFR BLD AUTO: 6.8 % (ref 0–12)
MONOCYTES NFR BLD AUTO: 8 % (ref 0–12)
MUCOUS THREADS URNS QL MICRO: ABNORMAL /HPF
NEUTROPHILS # BLD AUTO: 18.95 10*3/MM3 (ref 2–6.9)
NEUTROPHILS # BLD AUTO: 19.2 10*3/MM3 (ref 2–6.9)
NEUTROPHILS # BLD AUTO: 19.62 10*3/MM3 (ref 2–6.9)
NEUTROPHILS NFR BLD AUTO: 84.3 % (ref 37–80)
NEUTROPHILS NFR BLD AUTO: 85.8 % (ref 37–80)
NEUTROPHILS NFR BLD MANUAL: 86 % (ref 37–80)
NITRITE UR QL STRIP: NEGATIVE
NRBC BLD MANUAL-RTO: 0 /100 WBC (ref 0–0)
NRBC BLD MANUAL-RTO: 0 /100 WBC (ref 0–0)
OXYHGB MFR BLDV: 85.8 % (ref 94–99)
PCO2 BLDA: 32.8 MM HG (ref 35–45)
PH BLDA: 7.54 PH UNITS
PH UR STRIP.AUTO: 5.5 [PH] (ref 5–8)
PLAT MORPH BLD: NORMAL
PLATELET # BLD AUTO: 216 10*3/MM3 (ref 130–400)
PLATELET # BLD AUTO: 222 10*3/MM3 (ref 130–400)
PMV BLD AUTO: 10.1 FL (ref 6–12)
PMV BLD AUTO: 10.5 FL (ref 6–12)
PO2 BLDA: 46.5 MM HG (ref 75–100)
POTASSIUM BLD-SCNC: 3.9 MMOL/L (ref 3.5–5.1)
POTASSIUM BLD-SCNC: 4.4 MMOL/L (ref 3.5–5.1)
PROT SERPL-MCNC: 8.6 G/DL (ref 6.3–8.2)
PROT UR QL STRIP: ABNORMAL
PROTHROMBIN TIME: 16.3 SECONDS (ref 9.3–12.1)
RBC # BLD AUTO: 5.23 10*6/MM3 (ref 4.7–6.1)
RBC # BLD AUTO: 5.41 10*6/MM3 (ref 4.7–6.1)
RBC # UR: ABNORMAL /HPF
RBC MORPH BLD: NORMAL
RBC MORPH BLD: NORMAL
REF LAB TEST METHOD: ABNORMAL
SCAN SLIDE: NORMAL
SMALL PLATELETS BLD QL SMEAR: ADEQUATE
SODIUM BLD-SCNC: 133 MMOL/L (ref 137–145)
SODIUM BLD-SCNC: 136 MMOL/L (ref 137–145)
SP GR UR STRIP: 1.01 (ref 1–1.03)
SQUAMOUS #/AREA URNS HPF: ABNORMAL /HPF
TROPONIN I SERPL-MCNC: <0.012 NG/ML (ref 0–0.03)
URATE SERPL-MCNC: 6.3 MG/DL (ref 2.5–8.5)
UROBILINOGEN UR QL STRIP: ABNORMAL
WBC MORPH BLD: NORMAL
WBC MORPH BLD: NORMAL
WBC NRBC COR # BLD: 22.07 10*3/MM3 (ref 4.8–10.8)
WBC NRBC COR # BLD: 22.81 10*3/MM3 (ref 4.8–10.8)
WBC UR QL AUTO: ABNORMAL /HPF
WHOLE BLOOD HOLD SPECIMEN: NORMAL
WHOLE BLOOD HOLD SPECIMEN: NORMAL

## 2017-04-18 PROCEDURE — 36415 COLL VENOUS BLD VENIPUNCTURE: CPT | Performed by: PHYSICIAN ASSISTANT

## 2017-04-18 PROCEDURE — 87081 CULTURE SCREEN ONLY: CPT | Performed by: FAMILY MEDICINE

## 2017-04-18 PROCEDURE — 25010000002 ONDANSETRON PER 1 MG: Performed by: FAMILY MEDICINE

## 2017-04-18 PROCEDURE — 87077 CULTURE AEROBIC IDENTIFY: CPT | Performed by: FAMILY MEDICINE

## 2017-04-18 PROCEDURE — 82550 ASSAY OF CK (CPK): CPT | Performed by: FAMILY MEDICINE

## 2017-04-18 PROCEDURE — 20610 DRAIN/INJ JOINT/BURSA W/O US: CPT | Performed by: PHYSICIAN ASSISTANT

## 2017-04-18 PROCEDURE — 82805 BLOOD GASES W/O2 SATURATION: CPT

## 2017-04-18 PROCEDURE — 85007 BL SMEAR W/DIFF WBC COUNT: CPT | Performed by: PHYSICIAN ASSISTANT

## 2017-04-18 PROCEDURE — 86702 HIV-2 ANTIBODY: CPT | Performed by: FAMILY MEDICINE

## 2017-04-18 PROCEDURE — 87147 CULTURE TYPE IMMUNOLOGIC: CPT | Performed by: FAMILY MEDICINE

## 2017-04-18 PROCEDURE — 74176 CT ABD & PELVIS W/O CONTRAST: CPT

## 2017-04-18 PROCEDURE — 99284 EMERGENCY DEPT VISIT MOD MDM: CPT

## 2017-04-18 PROCEDURE — 83036 HEMOGLOBIN GLYCOSYLATED A1C: CPT | Performed by: FAMILY MEDICINE

## 2017-04-18 PROCEDURE — 70450 CT HEAD/BRAIN W/O DYE: CPT

## 2017-04-18 PROCEDURE — 83605 ASSAY OF LACTIC ACID: CPT | Performed by: FAMILY MEDICINE

## 2017-04-18 PROCEDURE — 25010000002 CEFTRIAXONE: Performed by: FAMILY MEDICINE

## 2017-04-18 PROCEDURE — 85025 COMPLETE CBC W/AUTO DIFF WBC: CPT | Performed by: PHYSICIAN ASSISTANT

## 2017-04-18 PROCEDURE — 82375 ASSAY CARBOXYHB QUANT: CPT

## 2017-04-18 PROCEDURE — 87186 SC STD MICRODIL/AGAR DIL: CPT | Performed by: FAMILY MEDICINE

## 2017-04-18 PROCEDURE — 87804 INFLUENZA ASSAY W/OPTIC: CPT | Performed by: FAMILY MEDICINE

## 2017-04-18 PROCEDURE — 25010000002 LEVOFLOXACIN PER 250 MG: Performed by: FAMILY MEDICINE

## 2017-04-18 PROCEDURE — 80048 BASIC METABOLIC PNL TOTAL CA: CPT | Performed by: PHYSICIAN ASSISTANT

## 2017-04-18 PROCEDURE — 82962 GLUCOSE BLOOD TEST: CPT

## 2017-04-18 PROCEDURE — 85610 PROTHROMBIN TIME: CPT | Performed by: FAMILY MEDICINE

## 2017-04-18 PROCEDURE — 87591 N.GONORRHOEAE DNA AMP PROB: CPT | Performed by: FAMILY MEDICINE

## 2017-04-18 PROCEDURE — 85025 COMPLETE CBC W/AUTO DIFF WBC: CPT | Performed by: FAMILY MEDICINE

## 2017-04-18 PROCEDURE — 25010000002 MORPHINE PER 10 MG: Performed by: FAMILY MEDICINE

## 2017-04-18 PROCEDURE — 72125 CT NECK SPINE W/O DYE: CPT

## 2017-04-18 PROCEDURE — 84484 ASSAY OF TROPONIN QUANT: CPT | Performed by: FAMILY MEDICINE

## 2017-04-18 PROCEDURE — 25010000002 VANCOMYCIN PER 500 MG: Performed by: FAMILY MEDICINE

## 2017-04-18 PROCEDURE — 84550 ASSAY OF BLOOD/URIC ACID: CPT | Performed by: PHYSICIAN ASSISTANT

## 2017-04-18 PROCEDURE — 81001 URINALYSIS AUTO W/SCOPE: CPT | Performed by: FAMILY MEDICINE

## 2017-04-18 PROCEDURE — 63710000001 PREDNISONE PER 1 MG: Performed by: FAMILY MEDICINE

## 2017-04-18 PROCEDURE — 80053 COMPREHEN METABOLIC PANEL: CPT | Performed by: FAMILY MEDICINE

## 2017-04-18 PROCEDURE — 84443 ASSAY THYROID STIM HORMONE: CPT | Performed by: FAMILY MEDICINE

## 2017-04-18 PROCEDURE — 87798 DETECT AGENT NOS DNA AMP: CPT | Performed by: FAMILY MEDICINE

## 2017-04-18 PROCEDURE — 85651 RBC SED RATE NONAUTOMATED: CPT | Performed by: PHYSICIAN ASSISTANT

## 2017-04-18 PROCEDURE — 99204 OFFICE O/P NEW MOD 45 MIN: CPT | Performed by: PHYSICIAN ASSISTANT

## 2017-04-18 PROCEDURE — 36415 COLL VENOUS BLD VENIPUNCTURE: CPT

## 2017-04-18 PROCEDURE — 93005 ELECTROCARDIOGRAM TRACING: CPT | Performed by: FAMILY MEDICINE

## 2017-04-18 PROCEDURE — 86140 C-REACTIVE PROTEIN: CPT | Performed by: PHYSICIAN ASSISTANT

## 2017-04-18 PROCEDURE — 25010000002 CEFEPIME PER 500 MG: Performed by: FAMILY MEDICINE

## 2017-04-18 PROCEDURE — 86701 HIV-1ANTIBODY: CPT | Performed by: FAMILY MEDICINE

## 2017-04-18 PROCEDURE — 71250 CT THORAX DX C-: CPT

## 2017-04-18 PROCEDURE — 83050 HGB METHEMOGLOBIN QUAN: CPT

## 2017-04-18 PROCEDURE — 99223 1ST HOSP IP/OBS HIGH 75: CPT | Performed by: FAMILY MEDICINE

## 2017-04-18 PROCEDURE — 87040 BLOOD CULTURE FOR BACTERIA: CPT | Performed by: FAMILY MEDICINE

## 2017-04-18 PROCEDURE — 36600 WITHDRAWAL OF ARTERIAL BLOOD: CPT

## 2017-04-18 PROCEDURE — 83735 ASSAY OF MAGNESIUM: CPT | Performed by: FAMILY MEDICINE

## 2017-04-18 RX ORDER — DIAZEPAM 5 MG/1
5 TABLET ORAL EVERY 12 HOURS PRN
Status: DISCONTINUED | OUTPATIENT
Start: 2017-04-18 | End: 2017-04-26 | Stop reason: HOSPADM

## 2017-04-18 RX ORDER — SODIUM CHLORIDE 9 MG/ML
75 INJECTION, SOLUTION INTRAVENOUS CONTINUOUS
Status: DISCONTINUED | OUTPATIENT
Start: 2017-04-18 | End: 2017-04-21

## 2017-04-18 RX ORDER — ONDANSETRON 2 MG/ML
4 INJECTION INTRAMUSCULAR; INTRAVENOUS ONCE
Status: COMPLETED | OUTPATIENT
Start: 2017-04-18 | End: 2017-04-18

## 2017-04-18 RX ORDER — FAMOTIDINE 20 MG/1
20 TABLET, FILM COATED ORAL 2 TIMES DAILY
Status: DISCONTINUED | OUTPATIENT
Start: 2017-04-19 | End: 2017-04-26 | Stop reason: HOSPADM

## 2017-04-18 RX ORDER — LEVOFLOXACIN 5 MG/ML
500 INJECTION, SOLUTION INTRAVENOUS ONCE
Status: COMPLETED | OUTPATIENT
Start: 2017-04-18 | End: 2017-04-18

## 2017-04-18 RX ORDER — PROMETHAZINE HYDROCHLORIDE 12.5 MG/1
12.5 TABLET ORAL EVERY 6 HOURS PRN
Status: DISCONTINUED | OUTPATIENT
Start: 2017-04-18 | End: 2017-04-26 | Stop reason: HOSPADM

## 2017-04-18 RX ORDER — SODIUM CHLORIDE 0.9 % (FLUSH) 0.9 %
1-10 SYRINGE (ML) INJECTION AS NEEDED
Status: DISCONTINUED | OUTPATIENT
Start: 2017-04-18 | End: 2017-04-26 | Stop reason: HOSPADM

## 2017-04-18 RX ORDER — BUPROPION HYDROCHLORIDE 150 MG/1
150 TABLET, EXTENDED RELEASE ORAL EVERY 12 HOURS SCHEDULED
Status: DISCONTINUED | OUTPATIENT
Start: 2017-04-18 | End: 2017-04-26 | Stop reason: HOSPADM

## 2017-04-18 RX ORDER — DEXTROSE MONOHYDRATE 25 G/50ML
25 INJECTION, SOLUTION INTRAVENOUS
Status: DISCONTINUED | OUTPATIENT
Start: 2017-04-18 | End: 2017-04-26 | Stop reason: HOSPADM

## 2017-04-18 RX ORDER — OXYCODONE HYDROCHLORIDE AND ACETAMINOPHEN 5; 325 MG/1; MG/1
1 TABLET ORAL EVERY 8 HOURS PRN
Status: DISCONTINUED | OUTPATIENT
Start: 2017-04-18 | End: 2017-04-19

## 2017-04-18 RX ORDER — MORPHINE SULFATE 2 MG/ML
2 INJECTION, SOLUTION INTRAMUSCULAR; INTRAVENOUS ONCE
Status: COMPLETED | OUTPATIENT
Start: 2017-04-18 | End: 2017-04-18

## 2017-04-18 RX ORDER — MORPHINE SULFATE 2 MG/ML
2 INJECTION, SOLUTION INTRAMUSCULAR; INTRAVENOUS EVERY 4 HOURS PRN
Status: DISCONTINUED | OUTPATIENT
Start: 2017-04-18 | End: 2017-04-19

## 2017-04-18 RX ORDER — SODIUM CHLORIDE 0.9 % (FLUSH) 0.9 %
10 SYRINGE (ML) INJECTION AS NEEDED
Status: DISCONTINUED | OUTPATIENT
Start: 2017-04-18 | End: 2017-04-26 | Stop reason: HOSPADM

## 2017-04-18 RX ORDER — PHENAZOPYRIDINE HYDROCHLORIDE 100 MG/1
200 TABLET, FILM COATED ORAL 3 TIMES DAILY PRN
Status: DISCONTINUED | OUTPATIENT
Start: 2017-04-18 | End: 2017-04-23

## 2017-04-18 RX ORDER — ACETAMINOPHEN 325 MG/1
650 TABLET ORAL EVERY 6 HOURS PRN
Status: DISCONTINUED | OUTPATIENT
Start: 2017-04-18 | End: 2017-04-26 | Stop reason: HOSPADM

## 2017-04-18 RX ORDER — IPRATROPIUM BROMIDE AND ALBUTEROL SULFATE 2.5; .5 MG/3ML; MG/3ML
3 SOLUTION RESPIRATORY (INHALATION) EVERY 6 HOURS PRN
Status: DISCONTINUED | OUTPATIENT
Start: 2017-04-18 | End: 2017-04-26 | Stop reason: HOSPADM

## 2017-04-18 RX ORDER — BRIMONIDINE TARTRATE AND TIMOLOL MALEATE 2; 5 MG/ML; MG/ML
2 SOLUTION OPHTHALMIC ONCE
Status: COMPLETED | OUTPATIENT
Start: 2017-04-18 | End: 2017-04-18

## 2017-04-18 RX ORDER — PREDNISONE 20 MG/1
20 TABLET ORAL DAILY
Status: DISCONTINUED | OUTPATIENT
Start: 2017-04-18 | End: 2017-04-24

## 2017-04-18 RX ORDER — TAMSULOSIN HYDROCHLORIDE 0.4 MG/1
0.4 CAPSULE ORAL NIGHTLY
Status: DISCONTINUED | OUTPATIENT
Start: 2017-04-18 | End: 2017-04-26 | Stop reason: HOSPADM

## 2017-04-18 RX ADMIN — OXYCODONE HYDROCHLORIDE AND ACETAMINOPHEN 1 TABLET: 5; 325 TABLET ORAL at 23:09

## 2017-04-18 RX ADMIN — DIAZEPAM 5 MG: 5 TABLET ORAL at 23:09

## 2017-04-18 RX ADMIN — SODIUM CHLORIDE 150 ML/HR: 9 INJECTION, SOLUTION INTRAVENOUS at 23:51

## 2017-04-18 RX ADMIN — CEFTRIAXONE 1 G: 1 INJECTION, POWDER, FOR SOLUTION INTRAMUSCULAR; INTRAVENOUS at 19:02

## 2017-04-18 RX ADMIN — Medication 10 ML: at 19:33

## 2017-04-18 RX ADMIN — SODIUM CHLORIDE 1000 ML: 9 INJECTION, SOLUTION INTRAVENOUS at 20:24

## 2017-04-18 RX ADMIN — ONDANSETRON 4 MG: 2 INJECTION INTRAMUSCULAR; INTRAVENOUS at 21:27

## 2017-04-18 RX ADMIN — ONDANSETRON 4 MG: 2 INJECTION INTRAMUSCULAR; INTRAVENOUS at 19:31

## 2017-04-18 RX ADMIN — SODIUM CHLORIDE 1000 ML: 9 INJECTION, SOLUTION INTRAVENOUS at 18:55

## 2017-04-18 RX ADMIN — TAMSULOSIN HYDROCHLORIDE 0.4 MG: 0.4 CAPSULE ORAL at 23:09

## 2017-04-18 RX ADMIN — VANCOMYCIN HYDROCHLORIDE 1000 MG: 1 INJECTION, POWDER, LYOPHILIZED, FOR SOLUTION INTRAVENOUS at 19:17

## 2017-04-18 RX ADMIN — BRIMONIDINE TARTRATE, TIMOLOL MALEATE 2 DROP: 2; 5 SOLUTION/ DROPS OPHTHALMIC at 23:08

## 2017-04-18 RX ADMIN — CEFEPIME 2 G: 1 INJECTION, POWDER, FOR SOLUTION INTRAMUSCULAR; INTRAVENOUS at 21:18

## 2017-04-18 RX ADMIN — LEVOFLOXACIN 500 MG: 5 INJECTION, SOLUTION INTRAVENOUS at 21:01

## 2017-04-18 RX ADMIN — PREDNISONE 20 MG: 20 TABLET ORAL at 23:09

## 2017-04-18 RX ADMIN — MORPHINE SULFATE 2 MG: 2 INJECTION, SOLUTION INTRAMUSCULAR; INTRAVENOUS at 19:33

## 2017-04-18 RX ADMIN — BUPROPION HYDROCHLORIDE 150 MG: 150 TABLET, FILM COATED, EXTENDED RELEASE ORAL at 23:09

## 2017-04-18 RX ADMIN — MORPHINE SULFATE 2 MG: 2 INJECTION, SOLUTION INTRAMUSCULAR; INTRAVENOUS at 21:32

## 2017-04-18 NOTE — PROGRESS NOTES
Subjective   Patient ID: Demi Meyer is a 56 y.o.  male   Pain and Edema of the Left Shoulder         History of Present Illness     Patient presents as a new patient with complaints of left shoulder pain that began abruptly on the morning of 4-15-17. There was no injury or trauma to the shoulder.  The patient was seen in [facility] ED on 4-15-17 for left shoulder pain. He had xrays of the shoulder which were neg. For acute process. The patient states the pain is constant throbbing but at times he will experience intense sharp pains to the left shoulder ( He localized to the top of shoulder AC joint)  He denies fevers or chills. Denies numbness or tingling. Denies current neck pain. He states his left shoulder pain is provoked abduction and ff of the left arm.       Patient also complains of right ankle pain, warmth and skin erythema that he noticed approx. 2 days ago. There was NO injury or trauma. Denies calf pain or swelling.   He states his biological brothers have + hx of Gout but he has never been Dx with gout.       Of note* the patient was seen in [facility] 4-13-17 for gross hematuria and what he thought was his usual kidney stone pain.. After a thorough work-up in the ED, the patient was Dx with UTI and given meds.. Currently taking Cipro Abx and he was called from [facility] today to advise him of + Urine culture and he was switched to Macrobid.       Pain Score: 8  Pain Location: Shoulder  Pain Orientation: Left     Pain Descriptors: Throbbing  Pain Frequency: Constant/continuous  Pain Onset: Sudden  Date Pain First Started: 04/16/17     Aggravating Factors: Other (Comment) (movement)        Pain Intervention(s): Medication (See MAR), Rest, Cold applied  Result of Injury: No  Work-Related Injury: No    Past Medical History:   Diagnosis Date   • Arthritis    • Asthma     Dr Butts   • Bronchiectasis    • Bronchitis, mucopurulent recurrent    • Diverticulitis    • Diverticulosis    • Gastric ulcer    • GERD  (gastroesophageal reflux disease)    • Glaucoma    • Hypertension    • Lumbosacral disc disease    • Neurologic disorder    • Renal calculi    • Scoliosis    • Stroke     TIA - slurred speech, discoordinated        Past Surgical History:   Procedure Laterality Date   • ABDOMINAL SURGERY     • APPENDECTOMY     • BACK SURGERY     • EYE SURGERY     • HERNIA REPAIR  2011    ventral & hiatal w/ mesh   • LUMBAR FUSION  1999    L3-S1   • TONSILLECTOMY         Family History   Problem Relation Age of Onset   • Hypertension Mother    • Cancer Mother      vulvar   • Diabetes Mother    • Diabetes Father    • Heart attack Father    • Irritable bowel syndrome Father    • Hypertension Father    • Irritable bowel syndrome Sister    • Cancer Brother    • Alcohol abuse Brother    • Prostate cancer Brother 61   • Stomach cancer Maternal Aunt      metastatic   • No Known Problems Maternal Uncle    • No Known Problems Paternal Aunt    • No Known Problems Maternal Grandmother    • Lung cancer Maternal Grandfather    • COPD Maternal Grandfather    • Alcohol abuse Maternal Grandfather    • Hypertension Maternal Grandfather    • Cancer Maternal Grandfather    • Esophageal cancer Paternal Grandmother    • Cancer Paternal Grandmother    • Asthma Sister    • No Known Problems Brother    • No Known Problems Maternal Uncle    • Gout Other    • Rheumatic fever Other    • Hyperlipidemia Other         Social History     Social History   • Marital status:      Spouse name: N/A   • Number of children: N/A   • Years of education: N/A     Occupational History   • Not on file.     Social History Main Topics   • Smoking status: Former Smoker     Packs/day: 0.25     Years: 20.00     Types: Cigarettes     Quit date: 8/18/2016   • Smokeless tobacco: Former User     Types: Chew   • Alcohol use 0.6 oz/week     1 Cans of beer per week      Comment: occassional   • Drug use: No   • Sexual activity: Yes     Partners: Female     Other Topics Concern   •  "Not on file     Social History Narrative    . Denies current tobacco or drug use. Drinks rare occasional alcohol.       Allergies   Allergen Reactions   • Amoxicillin Rash   • Erythromycin Itching   • Celecoxib    • Citalopram    • Clonidine Derivatives    • Gabapentin    • Hydrocodone GI Intolerance       Review of Systems   Constitutional: Positive for appetite change (decreased appetite) and diaphoresis. Negative for chills, fever and unexpected weight change.   HENT: Negative for dental problem and sore throat.    Eyes: Negative for visual disturbance.   Respiratory: Negative for shortness of breath.    Cardiovascular: Negative for chest pain.   Gastrointestinal: Negative for abdominal pain, constipation, diarrhea, nausea and vomiting.   Genitourinary: Positive for difficulty urinating and dysuria. Negative for frequency.   Musculoskeletal: Positive for arthralgias (left shoulder and right ankle).   Neurological: Positive for headaches (no HA currently).   Hematological: Does not bruise/bleed easily.   Psychiatric/Behavioral: Negative.    All other systems reviewed and are negative.      Objective   Temp 98.8 °F (37.1 °C) (Oral)   Resp 18  Ht 70\" (177.8 cm)  Wt 229 lb 14.4 oz (104 kg)  BMI 32.99 kg/m2   Physical Exam   Constitutional: He is oriented to person, place, and time. He appears distressed (seems in pain and uncomfortable).   HENT:   Head: Normocephalic.   Eyes: Conjunctivae are normal.   Neck: Normal range of motion. Neck supple. Muscular tenderness (bilateral trapezius, left supraclavicular region. NO abscess or warmth to the region) present. No spinous process tenderness present. No tracheal deviation present. No thyroid mass present.   Pulmonary/Chest: Effort normal and breath sounds normal.   Abdominal: Soft. There is no tenderness. There is no rebound.   Musculoskeletal:        Right shoulder: He exhibits tenderness (AC joint and proximal biceps tendon) and pain. He exhibits no " swelling, no effusion, no crepitus, no deformity, no spasm and normal strength.        Left shoulder: He exhibits bony tenderness (AC joint) and pain. He exhibits no swelling, no effusion, no crepitus, no deformity, no spasm and normal strength.        Right elbow: He exhibits normal range of motion. No tenderness found.        Left elbow: He exhibits normal range of motion. No tenderness found.        Right ankle: He exhibits swelling (mild lateral malleolus ). He exhibits no ecchymosis and no deformity. Achilles tendon exhibits no pain and no defect.        Left hand: He exhibits normal range of motion, normal capillary refill, no laceration and no swelling. Normal sensation noted. He exhibits no thumb/finger opposition.        Right foot: There is tenderness. There is normal range of motion, normal capillary refill, no crepitus, no deformity and no laceration.        Lymphadenopathy:     He has no cervical adenopathy.   Neurological: He is alert and oriented to person, place, and time.   Skin: No rash noted. He is diaphoretic. No erythema.   Patient has well healed incison to right side of the occipital from prior I&D of sebaceous cyst. NO signs of infection.     Psychiatric: His behavior is normal. Judgment and thought content normal.   Vitals reviewed.    Left Shoulder Exam     Range of Motion   Active Abduction: 70   Passive Abduction: 110 (although he does experience pain to the top of shoulder)   Left shoulder forward flexion: 98.   External Rotation: 50     Other   Erythema: absent  Sensation: normal     Comments:  The patient has NO discomfort with gentle int/ext rotation of left humerus. He only experiences pain with passive Abduction and passive FF. There is NO warmth to the shoulder joint.           Extremity DVT signs are Negative on physical exam with negative Tamara sign, with no calf pain, with no palpable cords and with no increased pain with passive stretch/extension   Neurologic Exam     Mental  Status   Oriented to person, place, and time.      Left Shoulder Exam     Comments:  The patient has NO discomfort with gentle int/ext rotation of left humerus. He only experiences pain with passive Abduction and passive FF. There is NO warmth to the shoulder joint.           Patient does have healed scars to T and L spine. No signs of infection. No TTP.   Negative Nuchal rigidity.     Assessment/Plan   Independent Review of Radiographic Studies:    No new imaging done today.   Xray of left shoulder from [facility] on 4-16-17 reveals no acute osseous of soft tissue defect.   Laboratory and Other Studies:  Reviewed labs from 4-13-17.  WBC 13   Cr. 0.8   UA culture revealed MRSA       Large Joint Arthrocentesis  Date/Time: 4/18/2017 10:31 PM  Consent given by: patient  Site marked: site marked  Timeout: Immediately prior to procedure a time out was called to verify the correct patient, procedure, equipment, support staff and site/side marked as required   Supporting Documentation  Indications: pain and diagnostic evaluation   Procedure Details  Location: shoulder - L glenohumeral  Preparation: Patient was prepped and draped in the usual sterile fashion  Needle size: 18 G  Approach: posterior  Aspirate amount: 0 mL  Patient tolerance: patient tolerated the procedure well with no immediate complications      there was NO fluid within the shoulder GH joint. I am confident the needle was within the GH joint.      Demi was seen today for pain and edema.    Diagnoses and all orders for this visit:    Acute pain of left shoulder  -     CBC & Differential  -     Basic Metabolic Panel  -     C-reactive Protein  -     Sedimentation Rate  -     Uric Acid  -     CBC Auto Differential  -     Scan Slide; Future  -     Scan Slide    Arthralgia of right ankle    Leukocytosis, unspecified type    Elevated serum creatinine    Elevated C-reactive protein (CRP)    Hx: UTI (urinary tract infection)    Other orders  -     Large Joint  Arthrocentesis           Recommendations/Plan:      Will send patient for outpatient labs. He was advised to stay within close vicinity of hospital incase there are any abnormalities that we feel would prompt an ER visit, etc.. Although the patient is non- toxic appearing, He does seem to appear uncomfortable. I have a strong suspicion he will need an ER eval and possible admission to hospital.    The patient's CBC and BMP did come back before after clinic hours. I called the patient and advised him to visit the ER due to increase in WBC count and the increase in Creatinine. He is agreeable to plan. Dr. Leija was also notified and will see the patient once in the ER    Total time spent on examining patient, counseling patient, reviewing recent labs/records and consulting ortho surgeon was approc 40 of the 45 minutes.     Patient agreeable to call or return sooner for any concerns.

## 2017-04-18 NOTE — ED PROVIDER NOTES
Subjective   HPI Comments: 56-year-old male presents the emergency room with complaints of shoulder pain that began 4 days ago.  Patient states that one week ago he had symptoms of hematuria he went to his primary care provider.  Patient states he was diagnosed with a urinary tract infection and was placed on Keflex.  Patient states that his symptoms persisted and on Thursday he was switched from Keflex to ciprofloxacin.  Patient states that he had a CT scan due to concern that he may have a kidney stone.  Patient states that her scan of his abdomen pelvis did not reveal any kidney stone but that he had a kidney infection.  He states that on Saturday he woke up with left shoulder pain he states he's had persistent left shoulder pain is made worse with movement as well as palpation.  Patient reports that pain radiates into his neck on the left side and is only made worse with movement of his left shoulder.  Patient states that his neck only hurts when he has severe shoulder pain.  He denies any chest pain or shortness of breath.  Patient states that he went to see Dr. Leija with orthopedics today to have a arthrocentesis of his left shoulder.  Patient states that at thoracentesis was unsuccessful however labs were drawn and compared to previous labs are drawn a few days ago and patient was noted to have acute renal failure as well as significant elevation in CRP set rate and white blood cell count.  Patient states he's had ongoing nausea vomiting as well.  He denies headache.  He denies dizziness.  Denies any abdominal pain this time.  He states that he has noticed a faint rash on his abdomen that has subsequently resolved.  He denies any tick bites, recent travel outside the [place], recent exposure to travel from outside the [place], recent hospitalizations, history of sexual transmitted diseases.  Patient does state that he has had some dysuria.  He also states that he's had some hematuria as well.   He states that he was contacted by his primary care provider today after urine culture returned revealing staff in his urine.  Patient was switched to Macrobid.  Patient does report that he has had approximately 6 different rounds of antibiotics over the past year due to various infections.  He states that prior to urinary tract infection he did recently have an upper respiratory infection.    Patient is a 56 y.o. male presenting with general illness.   Illness   Location:  Shoulder pain  Quality:  Aching  Severity:  Moderate  Onset quality:  Gradual  Timing:  Constant  Progression:  Worsening  Chronicity:  New  Relieved by:  Nothing  Worsened by:  Movement;palpation  Associated symptoms: fatigue, myalgias, nausea and vomiting    Associated symptoms: no abdominal pain, no chest pain, no congestion, no cough, no diarrhea, no ear pain, no fever, no rash, no rhinorrhea, no shortness of breath, no sore throat and no wheezing        Review of Systems   Constitutional: Positive for fatigue. Negative for activity change and fever.   HENT: Negative for congestion, ear pain, rhinorrhea, sore throat and tinnitus.    Eyes: Negative for visual disturbance.   Respiratory: Negative for apnea, cough, shortness of breath, wheezing and stridor.    Cardiovascular: Negative for chest pain.   Gastrointestinal: Positive for nausea and vomiting. Negative for abdominal pain and diarrhea.   Genitourinary: Positive for dysuria. Negative for flank pain.   Musculoskeletal: Positive for arthralgias and myalgias. Negative for neck stiffness.   Skin: Negative for rash.   Neurological: Negative for dizziness, weakness and light-headedness.   Hematological: Negative for adenopathy.   Psychiatric/Behavioral: Negative for agitation.   All other systems reviewed and are negative.      Past Medical History:   Diagnosis Date   • Arthritis    • Asthma    • Diverticulitis    • Diverticulosis    • Gastric ulcer    • GERD (gastroesophageal reflux disease)     • Glaucoma    • Hypertension    • Lumbosacral disc disease    • Neurologic disorder    • Renal calculi    • Scoliosis    • Stroke     TIA - slurred speech, discoordinated       Allergies   Allergen Reactions   • Amoxicillin Rash   • Erythromycin Itching   • Celecoxib    • Citalopram    • Clonidine Derivatives    • Gabapentin    • Hydrocodone GI Intolerance       Past Surgical History:   Procedure Laterality Date   • ABDOMINAL SURGERY     • APPENDECTOMY     • BACK SURGERY     • EYE SURGERY     • HERNIA REPAIR  2011    ventral & hiatal w/ mesh   • LUMBAR FUSION  1999    L3-S1   • TONSILLECTOMY         Family History   Problem Relation Age of Onset   • Hypertension Mother    • Cancer Mother      vulvar   • Diabetes Mother    • Diabetes Father    • Heart attack Father    • Irritable bowel syndrome Father    • Hypertension Father    • Irritable bowel syndrome Sister    • Cancer Brother    • Alcohol abuse Brother    • Prostate cancer Brother 61   • Stomach cancer Maternal Aunt      metastatic   • No Known Problems Maternal Uncle    • No Known Problems Paternal Aunt    • No Known Problems Maternal Grandmother    • Lung cancer Maternal Grandfather    • COPD Maternal Grandfather    • Alcohol abuse Maternal Grandfather    • Hypertension Maternal Grandfather    • Cancer Maternal Grandfather    • Esophageal cancer Paternal Grandmother    • Cancer Paternal Grandmother    • Asthma Sister    • No Known Problems Brother    • No Known Problems Maternal Uncle    • Gout Other    • Rheumatic fever Other    • Hyperlipidemia Other        Social History     Social History   • Marital status:      Spouse name: N/A   • Number of children: N/A   • Years of education: N/A     Social History Main Topics   • Smoking status: Former Smoker     Packs/day: 0.25     Years: 20.00     Types: Cigarettes   • Smokeless tobacco: Former User     Types: Chew   • Alcohol use 0.6 oz/week     1 Cans of beer per week      Comment: occassional   • Drug  use: No   • Sexual activity: Yes     Partners: Female     Other Topics Concern   • None     Social History Narrative           Objective   Physical Exam   Constitutional: He is oriented to person, place, and time.  Non-toxic appearance. He appears ill. No distress.   Neck: Trachea normal. Neck supple. No JVD present. No thyromegaly present.       Cardiovascular: Regular rhythm, S1 normal, S2 normal and normal heart sounds.   No extrasystoles are present.   No murmur heard.  Pulses:       Radial pulses are 2+ on the right side, and 2+ on the left side.        Dorsalis pedis pulses are 2+ on the right side, and 2+ on the left side.        Posterior tibial pulses are 2+ on the right side, and 2+ on the left side.   Pulmonary/Chest: Effort normal. He has no decreased breath sounds. He has no wheezes. He has no rhonchi. He has no rales.           Abdominal: There is no tenderness. There is no rigidity, no rebound, no guarding and no CVA tenderness.   Distended abdomen.  Obese.  Soft.   Musculoskeletal:        Right shoulder: He exhibits tenderness. He exhibits no swelling, no effusion and no crepitus.        Left shoulder: He exhibits decreased range of motion and pain. He exhibits no crepitus.        Right elbow: Normal.       Left elbow: Normal.        Arms:  No pain elicited to left shoulder with internal or external rotation.  Pain is elicited with absent reduction of left shoulder past 45° as well as flexion past 90.  No crepitance appreciated.  Diffuse tenderness to palpation.  Surgical scar along T-spine and L-spine.  Well-healed.  Old appearing scars.  No erythema.  Nontender palpation.  No area of fluctuance.   Neurological: He is alert and oriented to person, place, and time. No sensory deficit. GCS eye subscore is 4. GCS verbal subscore is 5. GCS motor subscore is 6.   Skin: No bruising, no ecchymosis, no laceration, no lesion, no petechiae and no rash noted. He is diaphoretic. No erythema. There is pallor.    Psychiatric: He has a normal mood and affect. His speech is normal.   Nursing note and vitals reviewed.      Procedures         ED Course  ED Course                Uponstates the emergency room patient was started on IV fluids as well as vancomycin and Rocephin for empiric coverage.  Patient states that he has tolerated several sports in the past.  Patient was reporting to have ongoing pain and therefore given morphine while emergency room.  Patient is noted to have currently oxygen saturation of 89% on room air.  We'll place patient on supplemental oxygen.  Patient is blood pressures to be 137/64.  We'll continue to monitor patient.    2026  Patient CT imaging still pending however patient does have findings concerning for pulmonary for pneumonia.  Have given IV Rocephin and they can last upon initial presentation emergency room however discussed with hospitalist patient is be placed on cefepime as well as Levaquin.  Patient given the 30 mL per kilogram bolus as per protocol for sepsis.  Patient was noted to have pulse ox of 88% patient repeat lung examination is clear.  However patient ABG reveals PO2 of 46 and is likely a venous component.  Regardless have placed patient on oxygen.  Have discussed case with Dr. Pabon who is agreeable to the patient hospital further progression treatment in the ICU.    2034  Have went to reevaluate patient patient is noted to have complaints of right groin pain.  Have evaluated patient no hernias appreciated.  No testicular tenderness.  Patient is circumcised.  Patient no penile discharge.  Patient with no tenderness with rebound.  Negative Rovsing.  Given patient's symptoms we will perform CT imaging of abdomen and pelvis.  [id number]    Final diagnoses:   Sepsis, due to unspecified organism   Pneumonia due to infectious organism, unspecified laterality, unspecified part of lung            Moises Meyer MD  04/19/17 1819

## 2017-04-19 ENCOUNTER — APPOINTMENT (OUTPATIENT)
Dept: GENERAL RADIOLOGY | Facility: HOSPITAL | Age: 57
End: 2017-04-19

## 2017-04-19 LAB
ALBUMIN SERPL-MCNC: 3.2 G/DL (ref 3.5–5)
ALBUMIN/GLOB SERPL: 1 G/DL (ref 1–2)
ALP SERPL-CCNC: 97 U/L (ref 38–126)
ALT SERPL W P-5'-P-CCNC: 78 U/L (ref 13–69)
ANION GAP SERPL CALCULATED.3IONS-SCNC: 16.7 MMOL/L
ANISOCYTOSIS BLD QL: ABNORMAL
AST SERPL-CCNC: 56 U/L (ref 15–46)
BILIRUB SERPL-MCNC: 0.7 MG/DL (ref 0.2–1.3)
BUN BLD-MCNC: 15 MG/DL (ref 7–20)
BUN/CREAT SERPL: 16.7 (ref 6.3–21.9)
CALCIUM SPEC-SCNC: 7.6 MG/DL (ref 8.4–10.2)
CHLORIDE SERPL-SCNC: 96 MMOL/L (ref 98–107)
CO2 SERPL-SCNC: 27 MMOL/L (ref 26–30)
CREAT BLD-MCNC: 0.9 MG/DL (ref 0.6–1.3)
CRP SERPL-MCNC: 43.8 MG/DL (ref 0–1)
D-LACTATE SERPL-SCNC: 1.5 MMOL/L (ref 0.5–2)
DEPRECATED RDW RBC AUTO: 47.3 FL (ref 37–54)
ERYTHROCYTE [DISTWIDTH] IN BLOOD BY AUTOMATED COUNT: 14 % (ref 11.5–14.5)
GFR SERPL CREATININE-BSD FRML MDRD: 88 ML/MIN/1.73
GLOBULIN UR ELPH-MCNC: 3.3 GM/DL
GLUCOSE BLD-MCNC: 213 MG/DL (ref 74–98)
GLUCOSE BLDC GLUCOMTR-MCNC: 122 MG/DL (ref 70–130)
GLUCOSE BLDC GLUCOMTR-MCNC: 135 MG/DL (ref 70–130)
GLUCOSE BLDC GLUCOMTR-MCNC: 145 MG/DL (ref 70–130)
GLUCOSE BLDC GLUCOMTR-MCNC: 181 MG/DL (ref 70–130)
HBA1C MFR BLD: 6.6 % (ref 3–6)
HCT VFR BLD AUTO: 41.6 % (ref 42–52)
HGB BLD-MCNC: 13.8 G/DL (ref 14–18)
INR PPP: 1.62 (ref 0.9–1.1)
LYMPHOCYTES # BLD MANUAL: 1.18 10*3/MM3 (ref 0.6–3.4)
LYMPHOCYTES NFR BLD MANUAL: 2 % (ref 0–12)
LYMPHOCYTES NFR BLD MANUAL: 6 % (ref 10–50)
MAGNESIUM SERPL-MCNC: 2 MG/DL (ref 1.6–2.3)
MCH RBC QN AUTO: 30.2 PG (ref 27–31)
MCHC RBC AUTO-ENTMCNC: 33.2 G/DL (ref 30–37)
MCV RBC AUTO: 91 FL (ref 80–94)
MONOCYTES # BLD AUTO: 0.39 10*3/MM3 (ref 0–0.9)
NEUTROPHILS # BLD AUTO: 18.16 10*3/MM3 (ref 2–6.9)
NEUTROPHILS NFR BLD MANUAL: 76 % (ref 37–80)
NEUTS BAND NFR BLD MANUAL: 16 % (ref 0–6)
NT-PROBNP SERPL-MCNC: 964 PG/ML (ref 0–125)
PLATELET # BLD AUTO: 199 10*3/MM3 (ref 130–400)
PMV BLD AUTO: 10.4 FL (ref 6–12)
POTASSIUM BLD-SCNC: 3.7 MMOL/L (ref 3.5–5.1)
PROT SERPL-MCNC: 6.5 G/DL (ref 6.3–8.2)
PROTHROMBIN TIME: 17.7 SECONDS (ref 9.3–12.1)
RBC # BLD AUTO: 4.57 10*6/MM3 (ref 4.7–6.1)
S PYO AG THROAT QL: NEGATIVE
SCAN SLIDE: NORMAL
SMALL PLATELETS BLD QL SMEAR: ADEQUATE
SODIUM BLD-SCNC: 136 MMOL/L (ref 137–145)
TROPONIN I SERPL-MCNC: 0.48 NG/ML (ref 0–0.03)
TROPONIN I SERPL-MCNC: 0.72 NG/ML (ref 0–0.03)
TSH SERPL DL<=0.05 MIU/L-ACNC: 1.18 MIU/ML (ref 0.47–4.68)
URATE SERPL-MCNC: 5.3 MG/DL (ref 2.5–8.5)
WBC MORPH BLD: NORMAL
WBC NRBC COR # BLD: 19.74 10*3/MM3 (ref 4.8–10.8)

## 2017-04-19 PROCEDURE — 82962 GLUCOSE BLOOD TEST: CPT

## 2017-04-19 PROCEDURE — 25010000002 MORPHINE PER 10 MG: Performed by: FAMILY MEDICINE

## 2017-04-19 PROCEDURE — 85610 PROTHROMBIN TIME: CPT | Performed by: FAMILY MEDICINE

## 2017-04-19 PROCEDURE — 84484 ASSAY OF TROPONIN QUANT: CPT | Performed by: FAMILY MEDICINE

## 2017-04-19 PROCEDURE — 87205 SMEAR GRAM STAIN: CPT | Performed by: FAMILY MEDICINE

## 2017-04-19 PROCEDURE — 71010 HC CHEST PA OR AP: CPT

## 2017-04-19 PROCEDURE — 87040 BLOOD CULTURE FOR BACTERIA: CPT | Performed by: FAMILY MEDICINE

## 2017-04-19 PROCEDURE — 99232 SBSQ HOSP IP/OBS MODERATE 35: CPT | Performed by: HOSPITALIST

## 2017-04-19 PROCEDURE — 80053 COMPREHEN METABOLIC PANEL: CPT | Performed by: FAMILY MEDICINE

## 2017-04-19 PROCEDURE — 85025 COMPLETE CBC W/AUTO DIFF WBC: CPT | Performed by: FAMILY MEDICINE

## 2017-04-19 PROCEDURE — 87081 CULTURE SCREEN ONLY: CPT | Performed by: FAMILY MEDICINE

## 2017-04-19 PROCEDURE — 87077 CULTURE AEROBIC IDENTIFY: CPT | Performed by: FAMILY MEDICINE

## 2017-04-19 PROCEDURE — 85007 BL SMEAR W/DIFF WBC COUNT: CPT | Performed by: FAMILY MEDICINE

## 2017-04-19 PROCEDURE — 84550 ASSAY OF BLOOD/URIC ACID: CPT | Performed by: FAMILY MEDICINE

## 2017-04-19 PROCEDURE — 93005 ELECTROCARDIOGRAM TRACING: CPT | Performed by: HOSPITALIST

## 2017-04-19 PROCEDURE — 83880 ASSAY OF NATRIURETIC PEPTIDE: CPT | Performed by: INTERNAL MEDICINE

## 2017-04-19 PROCEDURE — 83735 ASSAY OF MAGNESIUM: CPT | Performed by: FAMILY MEDICINE

## 2017-04-19 PROCEDURE — 83605 ASSAY OF LACTIC ACID: CPT | Performed by: FAMILY MEDICINE

## 2017-04-19 PROCEDURE — 87880 STREP A ASSAY W/OPTIC: CPT | Performed by: FAMILY MEDICINE

## 2017-04-19 PROCEDURE — 25010000002 CEFTRIAXONE: Performed by: FAMILY MEDICINE

## 2017-04-19 PROCEDURE — 87147 CULTURE TYPE IMMUNOLOGIC: CPT | Performed by: FAMILY MEDICINE

## 2017-04-19 PROCEDURE — 25010000002 ENOXAPARIN PER 10 MG: Performed by: FAMILY MEDICINE

## 2017-04-19 PROCEDURE — 86140 C-REACTIVE PROTEIN: CPT | Performed by: FAMILY MEDICINE

## 2017-04-19 PROCEDURE — 99231 SBSQ HOSP IP/OBS SF/LOW 25: CPT | Performed by: PHYSICIAN ASSISTANT

## 2017-04-19 PROCEDURE — 84484 ASSAY OF TROPONIN QUANT: CPT | Performed by: HOSPITALIST

## 2017-04-19 PROCEDURE — 63710000001 PROMETHAZINE PER 12.5 MG: Performed by: FAMILY MEDICINE

## 2017-04-19 PROCEDURE — 25010000002 VANCOMYCIN PER 500 MG: Performed by: HOSPITALIST

## 2017-04-19 PROCEDURE — 87186 SC STD MICRODIL/AGAR DIL: CPT | Performed by: FAMILY MEDICINE

## 2017-04-19 RX ORDER — ASPIRIN 81 MG/1
324 TABLET, CHEWABLE ORAL DAILY
Status: DISCONTINUED | OUTPATIENT
Start: 2017-04-19 | End: 2017-04-26 | Stop reason: HOSPADM

## 2017-04-19 RX ORDER — OXYCODONE HYDROCHLORIDE AND ACETAMINOPHEN 5; 325 MG/1; MG/1
1 TABLET ORAL EVERY 6 HOURS PRN
Status: DISCONTINUED | OUTPATIENT
Start: 2017-04-19 | End: 2017-04-26 | Stop reason: HOSPADM

## 2017-04-19 RX ORDER — MORPHINE SULFATE 4 MG/ML
4 INJECTION, SOLUTION INTRAMUSCULAR; INTRAVENOUS EVERY 4 HOURS PRN
Status: DISCONTINUED | OUTPATIENT
Start: 2017-04-19 | End: 2017-04-20

## 2017-04-19 RX ADMIN — CEFTRIAXONE 1 G: 1 INJECTION, POWDER, FOR SOLUTION INTRAMUSCULAR; INTRAVENOUS at 19:46

## 2017-04-19 RX ADMIN — OXYCODONE HYDROCHLORIDE AND ACETAMINOPHEN 1 TABLET: 5; 325 TABLET ORAL at 14:32

## 2017-04-19 RX ADMIN — SODIUM CHLORIDE 150 ML/HR: 9 INJECTION, SOLUTION INTRAVENOUS at 14:32

## 2017-04-19 RX ADMIN — FAMOTIDINE 20 MG: 20 TABLET, FILM COATED ORAL at 09:24

## 2017-04-19 RX ADMIN — VANCOMYCIN HYDROCHLORIDE 2000 MG: 500 INJECTION, POWDER, LYOPHILIZED, FOR SOLUTION INTRAVENOUS at 15:55

## 2017-04-19 RX ADMIN — ENOXAPARIN SODIUM 40 MG: 40 INJECTION SUBCUTANEOUS at 09:25

## 2017-04-19 RX ADMIN — PROMETHAZINE HYDROCHLORIDE 12.5 MG: 12.5 TABLET ORAL at 20:02

## 2017-04-19 RX ADMIN — BUPROPION HYDROCHLORIDE 150 MG: 150 TABLET, FILM COATED, EXTENDED RELEASE ORAL at 20:41

## 2017-04-19 RX ADMIN — TAMSULOSIN HYDROCHLORIDE 0.4 MG: 0.4 CAPSULE ORAL at 20:40

## 2017-04-19 RX ADMIN — PHENAZOPYRIDINE 200 MG: 100 TABLET ORAL at 09:25

## 2017-04-19 RX ADMIN — PROMETHAZINE HYDROCHLORIDE 12.5 MG: 12.5 TABLET ORAL at 10:33

## 2017-04-19 RX ADMIN — ACETAMINOPHEN 650 MG: 325 TABLET, FILM COATED ORAL at 20:40

## 2017-04-19 RX ADMIN — MORPHINE SULFATE 4 MG: 4 INJECTION, SOLUTION INTRAMUSCULAR; INTRAVENOUS at 22:06

## 2017-04-19 RX ADMIN — MORPHINE SULFATE 2 MG: 2 INJECTION, SOLUTION INTRAMUSCULAR; INTRAVENOUS at 04:22

## 2017-04-19 RX ADMIN — OXYCODONE HYDROCHLORIDE AND ACETAMINOPHEN 1 TABLET: 5; 325 TABLET ORAL at 20:42

## 2017-04-19 RX ADMIN — MORPHINE SULFATE 4 MG: 4 INJECTION, SOLUTION INTRAMUSCULAR; INTRAVENOUS at 17:55

## 2017-04-19 RX ADMIN — BUPROPION HYDROCHLORIDE 150 MG: 150 TABLET, FILM COATED, EXTENDED RELEASE ORAL at 09:24

## 2017-04-19 RX ADMIN — METOPROLOL TARTRATE 12.5 MG: 25 TABLET ORAL at 20:41

## 2017-04-19 RX ADMIN — ASPIRIN 81 MG 324 MG: 81 TABLET ORAL at 17:55

## 2017-04-19 RX ADMIN — OXYCODONE HYDROCHLORIDE AND ACETAMINOPHEN 1 TABLET: 5; 325 TABLET ORAL at 04:21

## 2017-04-19 RX ADMIN — MORPHINE SULFATE 4 MG: 4 INJECTION, SOLUTION INTRAMUSCULAR; INTRAVENOUS at 10:33

## 2017-04-19 RX ADMIN — FAMOTIDINE 20 MG: 20 TABLET, FILM COATED ORAL at 17:44

## 2017-04-19 NOTE — H&P
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  04/18/17 2049    Narrative:       FINAL REPORT    TECHNIQUE:  Routine axial images through the head were obtained without contrast.    CLINICAL HISTORY:  PAIN, NO INJURY    FINDINGS:  The ventricles are normal. There is no mass or other abnormal hypodensity. There is no shift of midline structures. There is no intracranial hemorrhage. There are postoperative changes in the left orbit and an air-fluid level in the right maxillary sinus   that may be due to acute on chronic sinusitis. The sinuses are otherwise clear and no acute osseous abnormality is seen.      Impression:       Possible acute on chronic sinusitis. No intracranial abnormality.    Authenticated by Jonnie Aleman M.D. on 04/18/2017 08:47:48 PM    CT Chest Without Contrast [[id number]] Collected:  04/18/17 2048     Updated:  04/18/17 2050    Narrative:       FINAL REPORT    TECHNIQUE:  Routine axial images were obtained from the lung apices to below the diaphragm without contrast.    CLINICAL HISTORY:  BILATERAL SHOULDER PAIN, CHEST PAIN, NO INJURY    FINDINGS:  Other than slight bibasilar atelectasis, the lungs are clear. The heart and vasculature are unremarkable. There is no pleural disease, adenopathy, or significant osseous abnormality.       Impression:       No significant abnormality.    Authenticated by Jonnie Aleman M.D. on 04/18/2017 08:48:33 PM    CT Abdomen Pelvis Without Contrast [[id number]] Collected:  04/18/17 2119     Updated:  04/18/17 2121    Narrative:       FINAL REPORT    TECHNIQUE:  Axial images through the abdomen and pelvis were obtained by computed tomography.  IV contrast was withheld.    CLINICAL HISTORY:  RLQ PAIN    FINDINGS:  Abdomen:  The gallbladder, solid abdominal organs and ureters are normal. The GI tract is unremarkable. The appendix appears to have been removed. There is no evidence of appendicitis in any case.    Pelvis: The urinary bladder is unremarkable. There are postoperative and degenerative changes in the lumbar spine. There is no pelvic or abdominal ascites, adenopathy or acute osseous abnormality.      Impression:       No acute disease.    Authenticated by Jonnie Aleman M.D. on 04/18/2017 09:19:16 PM          Assessment:  Active Problems:    Severe sepsis due to methicillin resistant Staphylococcus aureus (MRSA) with acute organ dysfunction    Primary osteoarthritis involving multiple joints    Headache    UTI (urinary tract infection), bacterial    Abdominal pain, lower    Shoulder pain    Acute right ankle pain    Acute gout of right ankle    Acute kidney injury    Dehydration with hyponatremia    Gastroesophageal reflux disease    Anxiety    Essential hypertension    Mild intermittent asthma without complication    Acute maxillary sinusitis  Recent right neck abscess, healing presume it was MRSA  Leukocytosis, on recent prednisone for arthritis  Mild acute metabolic encephalopathy, with sepsis  Acute hypoxia, after pain medication and suspect possible FARHAD      Plan:    Admit to ICU for now with severe sepsis with possibility of developing shock.  He is still getting final third liter fluid bolus to total 3 L or sepsis calculated fluid bolus.  Levophed will be ordered if needed.  Blood cultures have been obtained but  these need to be changed to MCKAYLA cultures. For now, with diffuse joint pain, sinusitis, and recent skin infection, I will keep ceftriaxone on board while awaiting culture results. Original urine culture is positive for MRSA so Vancomycin will be continued. Continue maintenance iv normal saline overnight at 150cc per hour. Tylenol and cooling blanket ordered as needed for fever. Expect creatinine should improve once hydrated. Follow urine output and consider ayala if does not have adequate output with need for strict urine output monitoring.     Morphine and percocet ordered as needed for pain. Recheck uric acid but suspect gout with positive family history, clinically positive, with acute renal failure. Continue prednisone started in ER for acute joint arthritis. Check throat swab for strep. Many arthritis tests pending, ordered by ER physician. Strep test pending. MRSA screen pending. Dr Leija, consulted preadmission, will follow along but I do not suspect joint related sepsis. Dr Leija did request CT shoulderd, which was not done in ER and still pending. Hold home antihypertensives and avoid further nsaids. Medication risks and benefits discussed. JARED pending.     Currently, condition is guarded. He will require 2-3 days or more of inpatient treatment. He is full code. Lovenox and Pepcid ordered for prophylaxis.     Oxygen added and cpap at night and as needed. Repeat chest xray again in AM for comparison.     Discussed plan with patient and wife and they reported being satisfied.      Janett Pabon,   04/18/17  11:09 PM

## 2017-04-19 NOTE — CONSULTS
Referring Provider: Dr Mcdonald  Reason for Consultation: abnormal troponin     Patient Care Team:  Angelita Lu MD as PCP - General        History of present illness: This is a middle-aged gentleman who has had a sebaceous cyst in the neck early March.  Had this for about a week had incision and drainage done.  There was recurrence with repeat incision and drainage.  After this patient has had multiple joint involvements and skin lesions have appeared.  Now has come in feeling really very sick.  Has been growing and positive cocci in clusters in the blood cultures.  Has been having chills and fevers.    Review of Systems   Pertinent items are noted in HPI  Review of Systems      History  No significant past medical history.    Personal history nonsmoker does not drink alcohol to be struck stenosis and recurrent anxiety or depression        Family history not has 4 siblings have not had any cardiac issues but    Review of symptoms has had generalized malaise and weakness and fevers and chills at this point abdominal pain.  Has had pain in the legs.  His no stroke has had joint swellings.    Past Surgical History:   Procedure Laterality Date   • ABDOMINAL SURGERY     • APPENDECTOMY     • BACK SURGERY     • EYE SURGERY     • HERNIA REPAIR  2011    ventral & hiatal w/ mesh   • LUMBAR FUSION  1999    L3-S1   • TONSILLECTOMY     , Family History   Problem Relation Age of Onset   • Hypertension Mother    • Cancer Mother      vulvar   • Diabetes Mother    • Diabetes Father    • Heart attack Father    • Irritable bowel syndrome Father    • Hypertension Father    • Irritable bowel syndrome Sister    • Cancer Brother    • Alcohol abuse Brother    • Prostate cancer Brother 61   • Stomach cancer Maternal Aunt      metastatic   • No Known Problems Maternal Uncle    • No Known Problems Paternal Aunt    • No Known Problems Maternal Grandmother    • Lung cancer Maternal Grandfather    • COPD Maternal Grandfather    • Alcohol  abuse Maternal Grandfather    • Hypertension Maternal Grandfather    • Cancer Maternal Grandfather    • Esophageal cancer Paternal Grandmother    • Cancer Paternal Grandmother    • Asthma Sister    • No Known Problems Brother    • No Known Problems Maternal Uncle    • Gout Other    • Rheumatic fever Other    • Hyperlipidemia Other    , Social History   Substance Use Topics   • Smoking status: Former Smoker     Packs/day: 0.25     Years: 20.00     Types: Cigarettes     Quit date: 8/18/2016   • Smokeless tobacco: Former User     Types: Chew   • Alcohol use 0.6 oz/week     1 Cans of beer per week      Comment: occassional   , Facility-Administered Medications Prior to Admission   Medication Dose Route Frequency Provider Last Rate Last Dose   • Testosterone Cypionate (DEPOTESTOTERONE CYPIONATE) injection 300 mg  300 mg Intramuscular Q14 Days Angelita Lu MD   300 mg at 03/01/17 1239     Prescriptions Prior to Admission   Medication Sig Dispense Refill Last Dose   • amLODIPine-benazepril (LOTREL) 10-40 MG per capsule Take 1 capsule by mouth Daily. 90 capsule 1 4/18/2017 at 0800   • brimonidine-timolol (COMBIGAN) 0.2-0.5 % ophthalmic solution 2 drops 1 (one) time. 2 drops in left eye once per day   4/18/2017 at 0800   • buPROPion SR (WELLBUTRIN SR) 150 MG 12 hr tablet Take 1 tablet by mouth 3 (Three) Times a Day. 90 tablet 5 4/18/2017 at 0800   • cetirizine (zyrTEC) 10 MG tablet Take 10 mg by mouth Daily.   4/18/2017 at 0800   • cyclobenzaprine (FLEXERIL) 10 MG tablet Take 1 tablet by mouth 3 (Three) Times a Day As Needed for Muscle Spasms. 12 tablet 0 4/18/2017 at 0800   • diazepam (VALIUM) 5 MG tablet Take 5 mg by mouth Every 12 (Twelve) Hours As Needed for anxiety.   Past Week at Unknown time   • flunisolide (NASALIDE) 25 MCG/ACT (0.025%) solution nasal spray Inhale 1 spray Every 12 (Twelve) Hours. 1 bottle 5 4/18/2017 at 0800   • hydrochlorothiazide (HYDRODIURIL) 25 MG tablet Take 1 tablet by mouth Daily. 90 tablet  1 4/18/2017 at 0900   • oxyCODONE-acetaminophen (PERCOCET) 5-325 MG per tablet Take 1-2 tablets by mouth Every 4 (Four) Hours As Needed for Moderate Pain (4-6). 12 tablet 0 4/18/2017 at 1300   • PROAIR  (90 BASE) MCG/ACT inhaler Inhale 2 puffs Every 6 (Six) Hours As Needed for wheezing or shortness of air. 1 inhaler 3 Past Week at Unknown time   • tamsulosin (FLOMAX) 0.4 MG capsule 24 hr capsule Take 1 capsule by mouth Every Night. 7 capsule 0 4/17/2017 at 2000   • Testosterone Cypionate (DEPOTESTOTERONE CYPIONATE) 200 MG/ML injection Inject 1 mL into the shoulder, thigh, or buttocks every 14 (fourteen) days.   Past Month at Unknown time   • aspirin 325 MG tablet Take 650 mg by mouth Daily As Needed for mild pain (1-3).   4/18/2017   • benzonatate (TESSALON) 200 MG capsule Take 1 capsule by mouth 3 (Three) Times a Day As Needed for cough. (Patient not taking: Reported on 4/19/2017) 30 capsule 0 Not Taking at Unknown time   • ciprofloxacin (CIPRO) 500 MG tablet Take 1 tablet by mouth 2 (Two) Times a Day for 14 days. 28 tablet 0 4/18/2017   • Fluticasone Furoate (ARNUITY ELLIPTA) 200 MCG/ACT aerosol powder  Inhale 1 puff Daily. Rinse mouth with water after use. (Patient not taking: Reported on 4/19/2017) 30 each 5 Not Taking at Unknown time   • guaiFENesin (MUCINEX) 600 MG 12 hr tablet Take 1 tablet by mouth 2 (Two) Times a Day. 14 tablet 0 Taking   • hydrochlorothiazide (MICROZIDE) 12.5 MG capsule Take 1 capsule by mouth Daily. (Patient not taking: Reported on 4/19/2017) 90 capsule 1 Not Taking at Unknown time   • ketorolac (TORADOL) 10 MG tablet Take 1 tablet by mouth Every 6 (Six) Hours As Needed for Moderate Pain (4-6) or Severe Pain (7-10). 12 tablet 0 4/18/2017   • ondansetron (ZOFRAN) 4 MG tablet Take 1 tablet by mouth Every 6 (Six) Hours As Needed for Nausea or Vomiting. 10 tablet 0 4/18/2017   • oxyCODONE-acetaminophen (PERCOCET) 5-325 MG per tablet Take 1 tablet by mouth Every 8 (Eight) Hours As  "Needed for Severe Pain (7-10). 10 tablet 0 Unknown at Unknown time   • phenazopyridine (PYRIDIUM) 200 MG tablet Take 1 tablet by mouth 3 (Three) Times a Day As Needed for bladder spasms. (Patient not taking: Reported on 4/19/2017) 6 tablet 0 Not Taking at Unknown time   • predniSONE (DELTASONE) 10 MG tablet Take 1 tablet by mouth Daily. Take 4 for 3 days, then take 3 for 3 days, then take 2 for 3 days, then take 1 for 3 days, then stop (Patient not taking: Reported on 4/19/2017) 30 tablet 0 Not Taking at Unknown time   , Scheduled Meds:    aspirin 324 mg Oral Daily   buPROPion  mg Oral Q12H   ceftriaxone 1 g Intravenous Q24H   enoxaparin 40 mg Subcutaneous Daily   famotidine 20 mg Oral BID   insulin aspart 0-7 Units Subcutaneous 4x Daily AC & at Bedtime   predniSONE 20 mg Oral Daily   tamsulosin 0.4 mg Oral Nightly   vancomycin 2,000 mg Intravenous Q12H   , Continuous Infusions:    Pharmacy to dose vancomycin     sodium chloride 150 mL/hr Last Rate: 150 mL/hr (04/19/17 1432)   , PRN Meds:  •  acetaminophen  •  dextrose  •  dextrose  •  diazePAM  •  glucagon (human recombinant)  •  ipratropium-albuterol  •  Morphine  •  oxyCODONE-acetaminophen  •  vancomycin **AND** Pharmacy to dose vancomycin  •  phenazopyridine  •  promethazine  •  sodium chloride  •  sodium chloride, Allergies:  Amoxicillin; Erythromycin; Celecoxib; Citalopram; Clonidine derivatives; Gabapentin; and Hydrocodone     Objective     Vital Sign Min/Max for last 24 hours  Temp  Min: 97.9 °F (36.6 °C)  Max: 102.4 °F (39.1 °C)   BP  Min: 101/76  Max: 146/100   Pulse  Min: 84  Max: 101   Resp  Min: 10  Max: 28   SpO2  Min: 85 %  Max: 94 %   Flow (L/min)  Min: 4  Max: 4   Weight  Min: 230 lb (104 kg)  Max: 230 lb (104 kg)     Flowsheet Rows         First Filed Value    Admission Height  70\" (177.8 cm) Documented at 04/18/2017 1740    Admission Weight  230 lb (104 kg) Documented at 04/18/2017 1740             Ejection Fraction  No results found for: " EF    Echo EF Estimated  Lab Results   Component Value Date    ECHOEFEST 60 02/03/2017       Nuclear Stress Ejection Fraction  No components found for: NUCEF    Cath Ejection Fraction Quantitative  No results found for: CATHEF    Physical Exam:     General Appearance:    Alert, cooperative, in no acute distress   Head:    Normocephalic, without obvious abnormality, atraumatic   Eyes:            Lids and lashes normal, conjunctivae and sclerae normal, no   icterus, no pallor, corneas clear, PERRLA   Ears:    Ears appear intact with no abnormalities noted   Throat:   No oral lesions, no thrush, oral mucosa moist   Neck:   No adenopathy, supple, trachea midline, no thyromegaly, no   carotid bruit, no JVD   Back:     No kyphosis present, no scoliosis present, no skin lesions,      erythema or scars, no tenderness to percussion or                   palpation,   range of motion normal   Lungs:     Clear to auscultation,respirations regular, even and                  unlabored    Heart:    Regular rhythm and normal rate, normal S1 and S2, no            murmur, no gallop, no rub, no click   Chest Wall:    No abnormalities observed   Abdomen:     Normal bowel sounds, no masses, no organomegaly, soft        non-tender, non-distended, no guarding, no rebound                tenderness   Rectal:     Deferred   Extremities:   Moves all extremities well, no edema, no cyanosis, no             redness   Pulses:   Pulses palpable and equal bilaterally   Skin:   Patient has painful os close no in the right great toe plantar aspect has diffuse splinter hemorrhages Janeway lesions are evident in both the palms.     Lymph nodes:   No palpable adenopathy   Neurologic:   Cranial nerves 2 - 12 grossly intact, sensation intact, DTR       present and equal bilaterally       Results Review:   I reviewed the patient's new clinical results.    EKG: Sinus rhythm with ST-T wave changes in the inferior inferolateral leads nonspecific.    LAB DATA :            WBC   Date Value Ref Range Status   04/19/2017 19.74 (H) 4.80 - 10.80 10*3/mm3 Final     RBC   Date Value Ref Range Status   04/19/2017 4.57 (L) 4.70 - 6.10 10*6/mm3 Final     Hemoglobin   Date Value Ref Range Status   04/19/2017 13.8 (L) 14.0 - 18.0 g/dL Final     Hematocrit   Date Value Ref Range Status   04/19/2017 41.6 (L) 42.0 - 52.0 % Final     MCV   Date Value Ref Range Status   04/19/2017 91.0 80.0 - 94.0 fL Final     MCH   Date Value Ref Range Status   04/19/2017 30.2 27.0 - 31.0 pg Final     MCHC   Date Value Ref Range Status   04/19/2017 33.2 30.0 - 37.0 g/dL Final     RDW   Date Value Ref Range Status   04/19/2017 14.0 11.5 - 14.5 % Final     RDW-SD   Date Value Ref Range Status   04/19/2017 47.3 37.0 - 54.0 fl Final     MPV   Date Value Ref Range Status   04/19/2017 10.4 6.0 - 12.0 fL Final     Platelets   Date Value Ref Range Status   04/19/2017 199 130 - 400 10*3/mm3 Final     Neutrophil %   Date Value Ref Range Status   04/18/2017 84.3 (H) 37.0 - 80.0 % Final     Lymphocyte %   Date Value Ref Range Status   04/18/2017 4.4 (L) 10.0 - 50.0 % Final     Monocyte %   Date Value Ref Range Status   04/18/2017 8.0 0.0 - 12.0 % Final     Eosinophil %   Date Value Ref Range Status   04/18/2017 0.0 0.0 - 7.0 % Final     Basophil %   Date Value Ref Range Status   04/18/2017 0.7 0.0 - 2.5 % Final     Immature Grans %   Date Value Ref Range Status   04/18/2017 2.6 (H) 0.0 - 0.6 % Final     Neutrophils, Absolute   Date Value Ref Range Status   04/18/2017 19.20 (H) 2.00 - 6.90 10*3/mm3 Final     Neutrophils Absolute   Date Value Ref Range Status   04/19/2017 18.16 (H) 2.00 - 6.90 10*3/mm3 Final     Lymphocytes, Absolute   Date Value Ref Range Status   04/18/2017 1.01 0.60 - 3.40 10*3/mm3 Final     Monocytes, Absolute   Date Value Ref Range Status   04/18/2017 1.82 (H) 0.00 - 0.90 10*3/mm3 Final     Eosinophils, Absolute   Date Value Ref Range Status   04/18/2017 0.01 0.00 - 0.70 10*3/mm3 Final      Basophils, Absolute   Date Value Ref Range Status   04/18/2017 0.17 0.00 - 0.20 10*3/mm3 Final     Immature Grans, Absolute   Date Value Ref Range Status   04/18/2017 0.60 (H) 0.00 - 0.06 10*3/mm3 Final     nRBC   Date Value Ref Range Status   04/18/2017 0.0 0.0 - 0.0 /100 WBC Final       Lab Results   Component Value Date    GLUCOSE 213 (H) 04/19/2017    BUN 15 04/19/2017    CREATININE 0.90 04/19/2017    EGFRIFNONA 88 04/19/2017    BCR 16.7 04/19/2017    CO2 27.0 04/19/2017    CALCIUM 7.6 (L) 04/19/2017    ALBUMIN 3.20 (L) 04/19/2017    LABIL2 1.0 04/19/2017    AST 56 (H) 04/19/2017    ALT 78 (H) 04/19/2017       Lab Results   Component Value Date    CKTOTAL 72 04/18/2017    TROPONINI 0.483 (C) 04/19/2017       Lab Results   Component Value Date    DDIMER 0.28 03/01/2017       Site   Date Value Ref Range Status   04/18/2017 Arterial: left radial  Final     Russell's Test   Date Value Ref Range Status   04/18/2017 Positive  Final     pH, Arterial   Date Value Ref Range Status   04/18/2017 7.541 pH units Final     pCO2, Arterial   Date Value Ref Range Status   04/18/2017 32.8 (L) 35.0 - 45.0 mm Hg Final     pO2, Arterial   Date Value Ref Range Status   04/18/2017 46.5 (L) 75.0 - 100.0 mm Hg Final     HCO3, Arterial   Date Value Ref Range Status   04/18/2017 28.1 (H) 22.0 - 28.0 mmol/L Final     Base Excess, Arterial   Date Value Ref Range Status   04/18/2017 5.6 mmol/L Final     Hemoglobin, Blood Gas   Date Value Ref Range Status   04/18/2017 15.5 12 - 18 g/dL Final     Oxyhemoglobin   Date Value Ref Range Status   04/18/2017 85.8 (L) 94 - 99 % Final     Methemoglobin   Date Value Ref Range Status   04/18/2017 0.4 % Final     Carboxyhemoglobin   Date Value Ref Range Status   04/18/2017 1.7 % Final     Modality   Date Value Ref Range Status   04/18/2017 Room air  Final     FIO2   Date Value Ref Range Status   04/18/2017 21 % Final     Lab Results   Component Value Date    HGBA1C 6.6 (H) 04/18/2017       Results from  last 7 days  Lab Units 04/18/17  2259   TSH mIU/mL 1.180     Lab Results   Component Value Date    LIPASE 51 04/16/2017       IMAGING DATA:     Ct Abdomen Pelvis Without Contrast    Result Date: 4/18/2017  Narrative: FINAL REPORT TECHNIQUE: Axial images through the abdomen and pelvis were obtained by computed tomography.  IV contrast was withheld. CLINICAL HISTORY: RLQ PAIN FINDINGS: Abdomen:  The gallbladder, solid abdominal organs and ureters are normal. The GI tract is unremarkable. The appendix appears to have been removed. There is no evidence of appendicitis in any case. Pelvis: The urinary bladder is unremarkable. There are postoperative and degenerative changes in the lumbar spine. There is no pelvic or abdominal ascites, adenopathy or acute osseous abnormality.     Impression: No acute disease. Authenticated by Jonnie Aleman M.D. on 04/18/2017 09:19:16 PM    Xr Chest 2 View    Result Date: 4/17/2017  Narrative:  EXAMINATION: XR CHEST 2 VW - 04/16/2017  INDICATION: Chest pain.  COMPARISON: 09/28/2016  FINDINGS: Two-views chest reveal the cardiomediastinal silhouettes to be within normal limits. The lung fields are grossly clear. No pleural effusion or pneumothorax. Minimal degenerative change is seen within the spine. Pulmonary vascularity is within normal limits.      Impression: Stable chest. No acute cardiopulmonary disease.  FAX TO: A  DICTATED:     04/16/2017 EDITED:         04/16/2017  This report was finalized on 4/17/2017 11:10 AM by Dr. Josephine Cash MD.      Xr Shoulder 2+ View Left    Result Date: 4/17/2017  Narrative:  EXAMINATION: XR SHOULDER 2+ VW LEFT - 04/16/2017  INDICATION: Pain.  COMPARISON: None.  FINDINGS: Two-views left shoulder reveal no fracture or dislocation. Cortex is intact. Joint spaces are preserved. No soft tissue abnormality identified. No fracture or dislocation.         Impression: No acute bony abnormality identified.  FAX TO: A  DICTATED:     04/16/2017 EDITED:          04/16/2017  This report was finalized on 4/17/2017 11:10 AM by Dr. Josephine Cash MD.      Ct Head Without Contrast    Result Date: 4/18/2017  Narrative: FINAL REPORT TECHNIQUE: Routine axial images through the head were obtained without contrast. CLINICAL HISTORY: PAIN, NO INJURY FINDINGS: The ventricles are normal. There is no mass or other abnormal hypodensity. There is no shift of midline structures. There is no intracranial hemorrhage. There are postoperative changes in the left orbit and an air-fluid level in the right maxillary sinus  that may be due to acute on chronic sinusitis. The sinuses are otherwise clear and no acute osseous abnormality is seen.     Impression: Possible acute on chronic sinusitis. No intracranial abnormality. Authenticated by Jonnie Aleman M.D. on 04/18/2017 08:47:48 PM    Ct Chest Without Contrast    Result Date: 4/18/2017  Narrative: FINAL REPORT TECHNIQUE: Routine axial images were obtained from the lung apices to below the diaphragm without contrast. CLINICAL HISTORY: BILATERAL SHOULDER PAIN, CHEST PAIN, NO INJURY FINDINGS: Other than slight bibasilar atelectasis, the lungs are clear. The heart and vasculature are unremarkable. There is no pleural disease, adenopathy, or significant osseous abnormality.     Impression: No significant abnormality. Authenticated by Jonnie Aleman M.D. on 04/18/2017 08:48:33 PM    Ct Cervical Spine Without Contrast    Result Date: 4/18/2017  Narrative: FINAL REPORT TECHNIQUE: Thin section axial images were obtained through the cervical spine without contrast.  Coronal and sagittal reconstructed images were then provided. CLINICAL HISTORY: NECK PAIN, NO INJURY FINDINGS: There is normal alignment and curvature. There is no fracture. There are moderate changes of degenerative disc disease from C4-C7, but no significant posterior osteophytosis. There is no significant neuroforaminal narrowing at any level.     Impression: Moderate degenerative changes in the  lower cervical spine, otherwise no significant abnormality. Authenticated by Jonnie Aleman M.D. on 04/18/2017 08:46:37 PM    Xr Chest 1 View    Result Date: 4/19/2017  Narrative: PROCEDURE: XR CHEST 1 VW-  HISTORY: hypoxia; A41.9-Sepsis, unspecified organism; J18.9-Pneumonia, unspecified organism; N17.9-Acute kidney failure, unspecified  COMPARISON: None.  FINDINGS: The heart is normal in size. The mediastinum is unremarkable. There are low lung volumes with bibasilar atelectasis. The lungs are otherwise clear. There is no pneumothorax.  There are no acute osseous abnormalities.         Impression: Low lung volumes with bibasilar atelectasis.  Continued followup is recommended.  This report was finalized on 4/19/2017 8:02 AM by Anay Collazo M.D..    Ct Abdomen Pelvis Stone Protocol    Result Date: 4/13/2017  Narrative: EXAMINATION: CT ABDOMEN PELVIS STONE PROTOCOL- 04/13/2017  INDICATION: R31.9-Hematuria, unspecified     TECHNIQUE: CT scan of the abdomen and pelvis was performed without contrast.  The radiation dose reduction device was turned on for each scan per the ALARA (As Low as Reasonably Achievable) protocol.  COMPARISON: NONE  FINDINGS: The most superior images demonstrate no basilar pulmonary infiltrate or effusion. The liver and spleen are normal. There is no adrenal or pancreatic mass. There is no renal mass, stone or obstruction. There is no ascites, aneurysm or retroperitoneal lymphadenopathy. The appendix is surgically absent. There is no pelvic mass or fluid. There is no inguinal lymphadenopathy. There is no evidence of bladder stone.      Impression: Negative CT scan of the abdomen and pelvis.   D:  04/13/2017 E:  04/13/2017   This report was finalized on 4/13/2017 3:16 PM by Dr. Isaias Meyer MD.          DIAGNOSIS  #1 infective endocarditis high probability: Patient has multiple peripheral stigmata of infective endocarditis with a clinical history very suggestive the same too.  On exam has got  any S3 on examination with a soft systolic murmur.  Agree with 2-D echo cardiac exam in the morning.  If a transthoracic echo is negative might have to consider transesophageal echocardiography for definitive diagnosis.  We will start her on low-dose beta blocker therapy.    #2 positive troponins patient has intermediate risk profile for coronary artery disease nevertheless his oxygenation has been borderline to significantly diminished explaining supply demand mismatch leading to the same.  We'll continue to monitor the troponin at this time.  Would not treat this like a routine thrombotic event as of now still.    #3 hypertension: Does not seem to be an immediate issue at this time.    #4 diabetes mellitus newly diagnosed sliding scale insulin use in use.    5 hypoxemia: Oxygen supplementation with antibiotic therapy is ongoing.        Active Problems:    Gastroesophageal reflux disease    Anxiety    Primary osteoarthritis involving multiple joints    Essential hypertension    Headache    UTI (urinary tract infection), bacterial    Severe sepsis due to methicillin resistant Staphylococcus aureus (MRSA) with acute organ dysfunction    Mild intermittent asthma without complication    Abdominal pain, lower    Shoulder pain    Acute right ankle pain    Acute gout of right ankle    Acute maxillary sinusitis    Acute kidney injury    Dehydration with hyponatremia          I discussed the patients findings and my recommendations with patient and family    Patrice Parrish MD  04/19/17  7:33 PM      Please note that portions of this note may have been completed with a voice recognition program. Efforts were made to edit the dictations, but occasionally words are mistranscribed.

## 2017-04-19 NOTE — PROGRESS NOTES
INPATIENT PROGRESS NOTE    Date of Admission: 4/18/2017  Length of Stay: 2  Primary Care Physician: Angelita Lu MD    Subjective   HPI:  Mr. Meyer is a 56 year old male who presents to the [facility] ED with complaints of diffuse joint pain, significantly worse in his left shoulder, fevers, and MRSA UTI.  He met sepsis criteria in the ED and has been admitted for further treatment.      Interval History:  04/20/17 - Patient seen and examined, chart reviewed, history reviewed with patient.  Continues to complain of fevers and diffuse arthralagias.  Appetite poor.  Denies chest pain/dyspnea, nausea or vomiting.      Objective      Temp:  [97.9 °F (36.6 °C)-102.3 °F (39.1 °C)] 102.2 °F (39 °C)  Heart Rate:  [] 91  Resp:  [8-18] 18  BP: ()/(68-84) 105/77    Gen: Alert, appropriate, pleasant and interactive  HEENT: EOMI, ATNC, MMM  Neck: Supple  Heart: S1S2, RRR  Lungs: CTA bilaterally, no wheezes, rales, or rhonchi  Abdomen: Soft, NTND, BS+  Extremities: Warm, well-perfused, + pulses  Skin: P/W/D  Neuro: A/O x3, speech clear    Results Review:    I have reviewed the labs, radiology results and diagnostic studies.      Results from last 7 days  Lab Units 04/19/17  0517 04/18/17  1837 04/18/17  1622 04/16/17  0154   SODIUM mmol/L 136* 133* 136* 133   POTASSIUM mmol/L 3.7 4.4 3.9 4.1   CHLORIDE mmol/L 96* 90* 90* 96*   TOTAL CO2 mmol/L 27.0 27.0 33.0* 29.0   BUN mg/dL 15 22* 18 10   CREATININE mg/dL 0.90 1.70* 1.70* 0.80   CALCIUM mg/dL 7.6* 8.9 9.0 9.4   BILIRUBIN mg/dL 0.7 1.2  --  0.4   ALK PHOS U/L 97 128*  --  96   ALT (SGPT) U/L 78* 66  --  85*   AST (SGOT) U/L 56* 39  --  63*   GLUCOSE mg/dL 213* 150* 160* 160*         Results from last 7 days  Lab Units 04/19/17  0517 04/18/17  1837 04/18/17  1622   WBC 10*3/mm3 19.74* 22.81* 22.07*   HEMOGLOBIN g/dL 13.8* 16.6 15.9   HEMATOCRIT % 41.6* 49.3 48.4   PLATELETS 10*3/mm3 199 216 222   MONOCYTES % % 2.0 7.0  --          Results from last 7 days  Lab Units  04/19/17  0517 04/18/17  1837   INR  1.62* 1.49*       Blood Culture   Date Value Ref Range Status   04/19/2017 No growth at less than 24 hours  Preliminary   04/19/2017 No growth at less than 24 hours  Preliminary   04/18/2017 Abnormal Stain (A)  Preliminary   04/18/2017 Abnormal Stain (A)  Preliminary     Urine Culture   Date Value Ref Range Status   04/13/2017 10,000-20,000 CFU/mL Staphylococcus aureus, MRSA (C)  Final     Comment:       Methicillin resistant Staph aureus, patient may be an isolation risk.          I have reviewed the medications.      Current Facility-Administered Medications:   •  acetaminophen (TYLENOL) tablet 650 mg, 650 mg, Oral, Q6H PRN, Janett Pabon DO, 650 mg at 04/20/17 0408  •  aspirin chewable tablet 324 mg, 324 mg, Oral, Daily, Camden Mcdonald MD, 324 mg at 04/19/17 1755  •  buPROPion SR (WELLBUTRIN SR) 12 hr tablet 150 mg, 150 mg, Oral, Q12H, Janett Pabon DO, 150 mg at 04/19/17 2041  •  cefTRIAXone (ROCEPHIN) 1 g/50 mL 0.9% NS VTB (mbp), 1 g, Intravenous, Q24H, Janett Pabon DO, 1 g at 04/19/17 1946  •  dextrose (D50W) solution 25 g, 25 g, Intravenous, Q15 Min PRN, Janett Pabon DO  •  dextrose (INSTA-GLUCOSE) 77.4 % gel 1 tube, 1 tube, Oral, Q15 Min PRN, Janett Pabon DO  •  diazePAM (VALIUM) tablet 5 mg, 5 mg, Oral, Q12H PRN, Janett Pabon DO, 5 mg at 04/18/17 2309  •  enoxaparin (LOVENOX) syringe 40 mg, 40 mg, Subcutaneous, Daily, Janett Pabon DO, 40 mg at 04/19/17 0925  •  famotidine (PEPCID) tablet 20 mg, 20 mg, Oral, BID, Janett Pabon DO, 20 mg at 04/19/17 1744  •  glucagon (human recombinant) (GLUCAGEN DIAGNOSTIC) injection 1 mg, 1 mg, Subcutaneous, Q15 Min PRN, Janett Pabon DO  •  insulin aspart (novoLOG) injection 0-7 Units, 0-7 Units, Subcutaneous, 4x Daily AC & at Bedtime, Janett Pabon DO  •  ipratropium-albuterol (DUO-NEB) nebulizer solution 3 mL, 3 mL, Nebulization, Q6H PRN, Janett Pabon DO  •  metoprolol tartrate (LOPRESSOR) tablet  12.5 mg, 12.5 mg, Oral, 4x Daily, Patrice Parrish MD, 12.5 mg at 04/19/17 2041  •  Morphine injection 4 mg, 4 mg, Intravenous, Q4H PRN, Janett Pabon, DO, 4 mg at 04/20/17 0408  •  oxyCODONE-acetaminophen (PERCOCET) 5-325 MG per tablet 1 tablet, 1 tablet, Oral, Q6H PRN, Janett Pabon, DO, 1 tablet at 04/20/17 0650  •  vancomycin 2000 mg in sodium chloride 0.9% 500 mL IVPB, 2,000 mg, Intravenous, Q12H, 2,000 mg at 04/20/17 0347 **AND** Pharmacy to dose vancomycin, , Does not apply, Continuous PRN, Camden Mcdonald MD  •  phenazopyridine (PYRIDIUM) tablet 200 mg, 200 mg, Oral, TID PRN, Janett Pabon, DO, 200 mg at 04/19/17 0925  •  predniSONE (DELTASONE) tablet 20 mg, 20 mg, Oral, Daily, Janett Pabon, DO, 20 mg at 04/18/17 2309  •  promethazine (PHENERGAN) tablet 12.5 mg, 12.5 mg, Oral, Q6H PRN, Janett Pabon, DO, 12.5 mg at 04/20/17 0412  •  sodium chloride 0.9 % flush 1-10 mL, 1-10 mL, Intravenous, PRN, Janett Phelpse, DO  •  sodium chloride 0.9 % flush 10 mL, 10 mL, Intravenous, PRN, Moises Meyer MD, 10 mL at 04/18/17 1933  •  sodium chloride 0.9 % infusion, 150 mL/hr, Intravenous, Continuous, Janett Pabon DO, Last Rate: 150 mL/hr at 04/20/17 0004, 150 mL/hr at 04/20/17 0004  •  tamsulosin (FLOMAX) 24 hr capsule 0.4 mg, 0.4 mg, Oral, Nightly, Janett Pabon, DO, 0.4 mg at 04/19/17 2040    Assessment/Plan     Problem List  Principal Problem:    Severe sepsis due to methicillin resistant Staphylococcus aureus (MRSA) with acute organ dysfunction  Active Problems:    UTI (urinary tract infection), bacterial    Acute kidney injury    Dehydration with hyponatremia    Acute gout of right ankle    Acute maxillary sinusitis    Elevated LFTs    Elevated INR    Gastroesophageal reflux disease    Anxiety    Primary osteoarthritis involving multiple joints    Essential hypertension    Headache    Mild intermittent asthma without complication    Abdominal pain, lower    Shoulder pain    Acute right  ankle pain      Assessment/Plan    Disseminated MRSA infection  - repeat cultures pending  - continue antibiotics  - FANG    - Hold nephrotoxins, hydrate  - supportive care with pain control  - home medications as appropriate  - follow LFTs, INR    - A1C 6.6% --> continue accuchecks, correctional insulin    DVT Prophylaxis: enoxaparin    Camden Mcdonald MD 04/20/17 7:05 AM

## 2017-04-19 NOTE — NURSING NOTE
1500-called Brian-pharmacist and informed him that pts antibiotics were not continued when transferred to ICU.    1445-Paged \dr. Mcdonald to let him know that he needs to reorder antibiotics.

## 2017-04-19 NOTE — PLAN OF CARE
Problem: Patient Care Overview (Adult)  Goal: Plan of Care Review  Outcome: Ongoing (interventions implemented as appropriate)    04/19/17 0730   Coping/Psychosocial Response Interventions   Plan Of Care Reviewed With patient   Patient Care Overview   Progress unable to show any progress toward functional goals       Goal: Adult Individualization and Mutuality  Outcome: Ongoing (interventions implemented as appropriate)    Problem: Pain, Acute (Adult)  Goal: Identify Related Risk Factors and Signs and Symptoms  Outcome: Ongoing (interventions implemented as appropriate)    04/19/17 0730   Pain, Acute   Related Risk Factors (Acute Pain) disease process;infection   Signs and Symptoms (Acute Pain) alteration in muscle tone;BADLs/IADLs reluctance/inability to perform;facial mask of pain/grimace;fatigue/weakness;moaning;sleep pattern alteration;social withdrawal  (medicated as ordered.)       Goal: Acceptable Pain Control/Comfort Level  Outcome: Ongoing (interventions implemented as appropriate)    04/19/17 0730   Pain, Acute (Adult)   Acceptable Pain Control/Comfort Level making progress toward outcome   PRN MEDS GIVEN

## 2017-04-20 ENCOUNTER — APPOINTMENT (OUTPATIENT)
Dept: CARDIOLOGY | Facility: HOSPITAL | Age: 57
End: 2017-04-20
Attending: HOSPITALIST

## 2017-04-20 PROBLEM — R79.1 ELEVATED INR: Status: ACTIVE | Noted: 2017-04-20

## 2017-04-20 PROBLEM — R79.89 ELEVATED LFTS: Status: ACTIVE | Noted: 2017-04-20

## 2017-04-20 LAB
ALBUMIN SERPL-MCNC: 2.9 G/DL (ref 3.5–5)
ALBUMIN/GLOB SERPL: 0.9 G/DL (ref 1–2)
ALP SERPL-CCNC: 104 U/L (ref 38–126)
ALT SERPL W P-5'-P-CCNC: 104 U/L (ref 13–69)
ANION GAP SERPL CALCULATED.3IONS-SCNC: 13.5 MMOL/L
AST SERPL-CCNC: 107 U/L (ref 15–46)
BH CV ECHO MEAS - % IVS THICK: 83.3 %
BH CV ECHO MEAS - % LVPW THICK: 40.9 %
BH CV ECHO MEAS - AO ACC SLOPE: 2766 CM/SEC^2
BH CV ECHO MEAS - AO ACC TIME: 0.05 SEC
BH CV ECHO MEAS - BSA(HAYCOCK): 2.3 M^2
BH CV ECHO MEAS - BSA: 2.2 M^2
BH CV ECHO MEAS - BZI_BMI: 33 KILOGRAMS/M^2
BH CV ECHO MEAS - BZI_METRIC_HEIGHT: 177.8 CM
BH CV ECHO MEAS - BZI_METRIC_WEIGHT: 104.3 KG
BH CV ECHO MEAS - CONTRAST EF 4CH: 61.2 ML/M^2
BH CV ECHO MEAS - EDV(CUBED): 177.9 ML
BH CV ECHO MEAS - EDV(MOD-SP4): 49 ML
BH CV ECHO MEAS - EDV(TEICH): 155.2 ML
BH CV ECHO MEAS - EF(CUBED): 75.1 %
BH CV ECHO MEAS - EF(MOD-SP4): 61.2 %
BH CV ECHO MEAS - EF(TEICH): 66.3 %
BH CV ECHO MEAS - ESV(CUBED): 44.4 ML
BH CV ECHO MEAS - ESV(MOD-SP4): 19 ML
BH CV ECHO MEAS - ESV(TEICH): 52.3 ML
BH CV ECHO MEAS - FS: 37.1 %
BH CV ECHO MEAS - IVS/LVPW: 0.82
BH CV ECHO MEAS - IVSD: 0.81 CM
BH CV ECHO MEAS - IVSS: 1.5 CM
BH CV ECHO MEAS - LA DIMENSION: 3.5 CM
BH CV ECHO MEAS - LV DIASTOLIC VOL/BSA (35-75): 22.1 ML/M^2
BH CV ECHO MEAS - LV MASS(C)D: 192 GRAMS
BH CV ECHO MEAS - LV MASS(C)DI: 86.7 GRAMS/M^2
BH CV ECHO MEAS - LV MASS(C)S: 182.6 GRAMS
BH CV ECHO MEAS - LV MASS(C)SI: 82.4 GRAMS/M^2
BH CV ECHO MEAS - LV MAX PG: 9.6 MMHG
BH CV ECHO MEAS - LV MEAN PG: 5.5 MMHG
BH CV ECHO MEAS - LV SYSTOLIC VOL/BSA (12-30): 8.6 ML/M^2
BH CV ECHO MEAS - LV V1 MAX: 155 CM/SEC
BH CV ECHO MEAS - LV V1 MEAN: 109.3 CM/SEC
BH CV ECHO MEAS - LV V1 VTI: 25.8 CM
BH CV ECHO MEAS - LVIDD: 5.6 CM
BH CV ECHO MEAS - LVIDS: 3.5 CM
BH CV ECHO MEAS - LVLD AP4: 6.7 CM
BH CV ECHO MEAS - LVLS AP4: 4.8 CM
BH CV ECHO MEAS - LVOT AREA (M): 4.5 CM^2
BH CV ECHO MEAS - LVOT AREA: 4.6 CM^2
BH CV ECHO MEAS - LVOT DIAM: 2.4 CM
BH CV ECHO MEAS - LVPWD: 0.99 CM
BH CV ECHO MEAS - LVPWS: 1.4 CM
BH CV ECHO MEAS - MV A MAX VEL: 92.6 CM/SEC
BH CV ECHO MEAS - MV E MAX VEL: 108.3 CM/SEC
BH CV ECHO MEAS - MV E/A: 1.2
BH CV ECHO MEAS - MV MAX PG: 4.3 MMHG
BH CV ECHO MEAS - MV MEAN PG: 2.1 MMHG
BH CV ECHO MEAS - MV V2 MAX: 103.5 CM/SEC
BH CV ECHO MEAS - MV V2 MEAN: 68.9 CM/SEC
BH CV ECHO MEAS - MV V2 VTI: 25.5 CM
BH CV ECHO MEAS - MVA(VTI): 4.7 CM^2
BH CV ECHO MEAS - PA ACC SLOPE: 1483 CM/SEC^2
BH CV ECHO MEAS - PA ACC TIME: 0.09 SEC
BH CV ECHO MEAS - PA MAX PG: 5.6 MMHG
BH CV ECHO MEAS - PA MEAN PG: 2.7 MMHG
BH CV ECHO MEAS - PA PR(ACCEL): 37.8 MMHG
BH CV ECHO MEAS - PA V2 MAX: 118.1 CM/SEC
BH CV ECHO MEAS - PA V2 MEAN: 73.6 CM/SEC
BH CV ECHO MEAS - PA V2 VTI: 18.1 CM
BH CV ECHO MEAS - SI(CUBED): 60.3 ML/M^2
BH CV ECHO MEAS - SI(LVOT): 53.6 ML/M^2
BH CV ECHO MEAS - SI(MOD-SP4): 13.5 ML/M^2
BH CV ECHO MEAS - SI(TEICH): 46.5 ML/M^2
BH CV ECHO MEAS - SV(CUBED): 133.5 ML
BH CV ECHO MEAS - SV(LVOT): 118.7 ML
BH CV ECHO MEAS - SV(MOD-SP4): 30 ML
BH CV ECHO MEAS - SV(TEICH): 102.9 ML
BH CV ECHO MEAS - TR MAX VEL: 161.3 CM/SEC
BILIRUB SERPL-MCNC: 1 MG/DL (ref 0.2–1.3)
BUN BLD-MCNC: 13 MG/DL (ref 7–20)
BUN/CREAT SERPL: 16.3 (ref 6.3–21.9)
CALCIUM SPEC-SCNC: 7.4 MG/DL (ref 8.4–10.2)
CHLORIDE SERPL-SCNC: 97 MMOL/L (ref 98–107)
CO2 SERPL-SCNC: 27 MMOL/L (ref 26–30)
CREAT BLD-MCNC: 0.8 MG/DL (ref 0.6–1.3)
DEPRECATED RDW RBC AUTO: 48 FL (ref 37–54)
ERYTHROCYTE [DISTWIDTH] IN BLOOD BY AUTOMATED COUNT: 14.2 % (ref 11.5–14.5)
GFR SERPL CREATININE-BSD FRML MDRD: 101 ML/MIN/1.73
GLOBULIN UR ELPH-MCNC: 3.2 GM/DL
GLUCOSE BLD-MCNC: 132 MG/DL (ref 74–98)
GLUCOSE BLDC GLUCOMTR-MCNC: 135 MG/DL (ref 70–130)
GLUCOSE BLDC GLUCOMTR-MCNC: 151 MG/DL (ref 70–130)
GLUCOSE BLDC GLUCOMTR-MCNC: 156 MG/DL (ref 70–130)
GLUCOSE BLDC GLUCOMTR-MCNC: 164 MG/DL (ref 70–130)
GLUCOSE BLDC GLUCOMTR-MCNC: 174 MG/DL (ref 70–130)
HCT VFR BLD AUTO: 39.4 % (ref 42–52)
HGB BLD-MCNC: 13.3 G/DL (ref 14–18)
HIV 1 & 2 AB SERPLBLD IA.RAPID: NORMAL
HIV 2 AB SERPLBLD QL IA.RAPID: NEGATIVE
HIV1 AB SERPLBLD QL IA.RAPID: NEGATIVE
INR PPP: 1.69 (ref 0.9–1.1)
LYMPHOCYTES # BLD MANUAL: 1.01 10*3/MM3 (ref 0.6–3.4)
LYMPHOCYTES NFR BLD MANUAL: 5 % (ref 0–12)
LYMPHOCYTES NFR BLD MANUAL: 6 % (ref 10–50)
MAGNESIUM SERPL-MCNC: 2 MG/DL (ref 1.6–2.3)
MCH RBC QN AUTO: 31.1 PG (ref 27–31)
MCHC RBC AUTO-ENTMCNC: 33.8 G/DL (ref 30–37)
MCV RBC AUTO: 92.1 FL (ref 80–94)
METAMYELOCYTES NFR BLD MANUAL: 1 % (ref 0–0)
MONOCYTES # BLD AUTO: 0.84 10*3/MM3 (ref 0–0.9)
NEUTROPHILS # BLD AUTO: 14.86 10*3/MM3 (ref 2–6.9)
NEUTROPHILS NFR BLD MANUAL: 61 % (ref 37–80)
NEUTS BAND NFR BLD MANUAL: 27 % (ref 0–6)
PHOSPHATE SERPL-MCNC: 2.8 MG/DL (ref 2.5–4.5)
PLATELET # BLD AUTO: 208 10*3/MM3 (ref 130–400)
PMV BLD AUTO: 10.5 FL (ref 6–12)
POTASSIUM BLD-SCNC: 3.5 MMOL/L (ref 3.5–5.1)
PROT SERPL-MCNC: 6.1 G/DL (ref 6.3–8.2)
PROTHROMBIN TIME: 18.5 SECONDS (ref 9.3–12.1)
RBC # BLD AUTO: 4.28 10*6/MM3 (ref 4.7–6.1)
RBC MORPH BLD: NORMAL
RICK SF IGG TITR SER IF: NEGATIVE {TITER}
SMALL PLATELETS BLD QL SMEAR: ADEQUATE
SODIUM BLD-SCNC: 134 MMOL/L (ref 137–145)
TOXIC GRANULATION: ABNORMAL
VRE SPEC QL CULT: NORMAL
WBC NRBC COR # BLD: 16.89 10*3/MM3 (ref 4.8–10.8)

## 2017-04-20 PROCEDURE — 84100 ASSAY OF PHOSPHORUS: CPT | Performed by: HOSPITALIST

## 2017-04-20 PROCEDURE — 80053 COMPREHEN METABOLIC PANEL: CPT | Performed by: HOSPITALIST

## 2017-04-20 PROCEDURE — 25010000002 VANCOMYCIN PER 500 MG: Performed by: HOSPITALIST

## 2017-04-20 PROCEDURE — 25010000002 MORPHINE PER 10 MG: Performed by: FAMILY MEDICINE

## 2017-04-20 PROCEDURE — 93306 TTE W/DOPPLER COMPLETE: CPT

## 2017-04-20 PROCEDURE — 94799 UNLISTED PULMONARY SVC/PX: CPT

## 2017-04-20 PROCEDURE — 99233 SBSQ HOSP IP/OBS HIGH 50: CPT | Performed by: HOSPITALIST

## 2017-04-20 PROCEDURE — 85027 COMPLETE CBC AUTOMATED: CPT | Performed by: HOSPITALIST

## 2017-04-20 PROCEDURE — 83735 ASSAY OF MAGNESIUM: CPT | Performed by: HOSPITALIST

## 2017-04-20 PROCEDURE — 25010000002 DIPHENHYDRAMINE PER 50 MG: Performed by: HOSPITALIST

## 2017-04-20 PROCEDURE — 63710000001 PROMETHAZINE PER 12.5 MG: Performed by: FAMILY MEDICINE

## 2017-04-20 PROCEDURE — 94660 CPAP INITIATION&MGMT: CPT

## 2017-04-20 PROCEDURE — 85610 PROTHROMBIN TIME: CPT | Performed by: HOSPITALIST

## 2017-04-20 PROCEDURE — 63710000001 PREDNISONE PER 1 MG: Performed by: FAMILY MEDICINE

## 2017-04-20 PROCEDURE — 85007 BL SMEAR W/DIFF WBC COUNT: CPT | Performed by: HOSPITALIST

## 2017-04-20 PROCEDURE — 25010000002 HYDROMORPHONE PER 4 MG: Performed by: HOSPITALIST

## 2017-04-20 PROCEDURE — 25010000002 METHYLPREDNISOLONE PER 40 MG: Performed by: HOSPITALIST

## 2017-04-20 PROCEDURE — 25010000002 ENOXAPARIN PER 10 MG: Performed by: FAMILY MEDICINE

## 2017-04-20 PROCEDURE — 82962 GLUCOSE BLOOD TEST: CPT

## 2017-04-20 RX ORDER — DIPHENHYDRAMINE HYDROCHLORIDE 50 MG/ML
12.5 INJECTION INTRAMUSCULAR; INTRAVENOUS EVERY 6 HOURS PRN
Status: DISCONTINUED | OUTPATIENT
Start: 2017-04-21 | End: 2017-04-26 | Stop reason: HOSPADM

## 2017-04-20 RX ORDER — MORPHINE SULFATE 30 MG/1
30 TABLET, FILM COATED, EXTENDED RELEASE ORAL EVERY 12 HOURS SCHEDULED
Status: DISCONTINUED | OUTPATIENT
Start: 2017-04-20 | End: 2017-04-26 | Stop reason: HOSPADM

## 2017-04-20 RX ORDER — LORAZEPAM 2 MG/ML
0.5 INJECTION INTRAMUSCULAR EVERY 4 HOURS PRN
Status: DISCONTINUED | OUTPATIENT
Start: 2017-04-20 | End: 2017-04-23

## 2017-04-20 RX ORDER — DIPHENHYDRAMINE HYDROCHLORIDE 50 MG/ML
12.5 INJECTION INTRAMUSCULAR; INTRAVENOUS EVERY 6 HOURS PRN
Status: DISCONTINUED | OUTPATIENT
Start: 2017-04-20 | End: 2017-04-20

## 2017-04-20 RX ORDER — MIDAZOLAM HYDROCHLORIDE 1 MG/ML
5 INJECTION INTRAMUSCULAR; INTRAVENOUS ONCE
Status: COMPLETED | OUTPATIENT
Start: 2017-04-21 | End: 2017-04-21

## 2017-04-20 RX ORDER — RIFAMPIN 300 MG/1
600 CAPSULE ORAL EVERY 12 HOURS SCHEDULED
Status: DISCONTINUED | OUTPATIENT
Start: 2017-04-20 | End: 2017-04-20 | Stop reason: DRUGHIGH

## 2017-04-20 RX ORDER — RIFAMPIN 300 MG/1
600 CAPSULE ORAL EVERY 12 HOURS SCHEDULED
Status: DISCONTINUED | OUTPATIENT
Start: 2017-04-20 | End: 2017-04-20

## 2017-04-20 RX ORDER — RIFAMPIN 300 MG/1
300 CAPSULE ORAL EVERY 12 HOURS SCHEDULED
Status: DISCONTINUED | OUTPATIENT
Start: 2017-04-20 | End: 2017-04-20

## 2017-04-20 RX ORDER — BRIMONIDINE TARTRATE AND TIMOLOL MALEATE 2; 5 MG/ML; MG/ML
1 SOLUTION OPHTHALMIC EVERY 12 HOURS
Status: DISCONTINUED | OUTPATIENT
Start: 2017-04-20 | End: 2017-04-26 | Stop reason: HOSPADM

## 2017-04-20 RX ORDER — DIAZEPAM 5 MG/1
5 TABLET ORAL EVERY 8 HOURS
Status: DISCONTINUED | OUTPATIENT
Start: 2017-04-20 | End: 2017-04-21

## 2017-04-20 RX ORDER — RIFAMPIN 300 MG/1
300 CAPSULE ORAL EVERY 12 HOURS SCHEDULED
Status: DISCONTINUED | OUTPATIENT
Start: 2017-04-20 | End: 2017-04-26

## 2017-04-20 RX ORDER — METHYLPREDNISOLONE SODIUM SUCCINATE 40 MG/ML
40 INJECTION, POWDER, LYOPHILIZED, FOR SOLUTION INTRAMUSCULAR; INTRAVENOUS ONCE
Status: COMPLETED | OUTPATIENT
Start: 2017-04-20 | End: 2017-04-20

## 2017-04-20 RX ORDER — MEPERIDINE HYDROCHLORIDE 50 MG/ML
50 INJECTION INTRAMUSCULAR; INTRAVENOUS; SUBCUTANEOUS ONCE
Status: COMPLETED | OUTPATIENT
Start: 2017-04-21 | End: 2017-04-21

## 2017-04-20 RX ORDER — DIPHENHYDRAMINE HYDROCHLORIDE 50 MG/ML
12.5 INJECTION INTRAMUSCULAR; INTRAVENOUS ONCE
Status: COMPLETED | OUTPATIENT
Start: 2017-04-20 | End: 2017-04-20

## 2017-04-20 RX ADMIN — ASPIRIN 81 MG 324 MG: 81 TABLET ORAL at 08:43

## 2017-04-20 RX ADMIN — PROMETHAZINE HYDROCHLORIDE 12.5 MG: 12.5 TABLET ORAL at 04:12

## 2017-04-20 RX ADMIN — METOPROLOL TARTRATE 12.5 MG: 25 TABLET ORAL at 20:12

## 2017-04-20 RX ADMIN — ACETAMINOPHEN 650 MG: 325 TABLET, FILM COATED ORAL at 04:08

## 2017-04-20 RX ADMIN — FAMOTIDINE 20 MG: 20 TABLET, FILM COATED ORAL at 17:18

## 2017-04-20 RX ADMIN — TAMSULOSIN HYDROCHLORIDE 0.4 MG: 0.4 CAPSULE ORAL at 20:13

## 2017-04-20 RX ADMIN — METOPROLOL TARTRATE 12.5 MG: 25 TABLET ORAL at 08:44

## 2017-04-20 RX ADMIN — ACETAMINOPHEN 650 MG: 325 TABLET, FILM COATED ORAL at 13:09

## 2017-04-20 RX ADMIN — MORPHINE SULFATE 4 MG: 4 INJECTION, SOLUTION INTRAMUSCULAR; INTRAVENOUS at 04:08

## 2017-04-20 RX ADMIN — RIFAMPIN 300 MG: 300 CAPSULE ORAL at 20:13

## 2017-04-20 RX ADMIN — SODIUM CHLORIDE 75 ML/HR: 9 INJECTION, SOLUTION INTRAVENOUS at 09:44

## 2017-04-20 RX ADMIN — VANCOMYCIN HYDROCHLORIDE 2000 MG: 500 INJECTION, POWDER, LYOPHILIZED, FOR SOLUTION INTRAVENOUS at 03:47

## 2017-04-20 RX ADMIN — METOPROLOL TARTRATE 12.5 MG: 25 TABLET ORAL at 14:13

## 2017-04-20 RX ADMIN — OXYCODONE HYDROCHLORIDE AND ACETAMINOPHEN 1 TABLET: 5; 325 TABLET ORAL at 06:50

## 2017-04-20 RX ADMIN — MORPHINE SULFATE 4 MG: 4 INJECTION, SOLUTION INTRAMUSCULAR; INTRAVENOUS at 08:12

## 2017-04-20 RX ADMIN — SODIUM CHLORIDE 150 ML/HR: 9 INJECTION, SOLUTION INTRAVENOUS at 00:04

## 2017-04-20 RX ADMIN — FAMOTIDINE 20 MG: 20 TABLET, FILM COATED ORAL at 08:45

## 2017-04-20 RX ADMIN — MORPHINE SULFATE 30 MG: 30 TABLET, EXTENDED RELEASE ORAL at 20:13

## 2017-04-20 RX ADMIN — METHYLPREDNISOLONE SODIUM SUCCINATE 40 MG: 40 INJECTION, POWDER, FOR SOLUTION INTRAMUSCULAR; INTRAVENOUS at 18:53

## 2017-04-20 RX ADMIN — BUPROPION HYDROCHLORIDE 150 MG: 150 TABLET, FILM COATED, EXTENDED RELEASE ORAL at 08:45

## 2017-04-20 RX ADMIN — HYDROMORPHONE HYDROCHLORIDE 0.5 MG: 1 INJECTION, SOLUTION INTRAMUSCULAR; INTRAVENOUS; SUBCUTANEOUS at 08:42

## 2017-04-20 RX ADMIN — DIAZEPAM 5 MG: 5 TABLET ORAL at 08:44

## 2017-04-20 RX ADMIN — DIAZEPAM 5 MG: 5 TABLET ORAL at 17:18

## 2017-04-20 RX ADMIN — RIFAMPIN 600 MG: 300 CAPSULE ORAL at 09:42

## 2017-04-20 RX ADMIN — CEFTAROLINE FOSAMIL 600 MG: 600 POWDER, FOR SOLUTION INTRAVENOUS at 20:11

## 2017-04-20 RX ADMIN — SODIUM CHLORIDE 75 ML/HR: 9 INJECTION, SOLUTION INTRAVENOUS at 08:45

## 2017-04-20 RX ADMIN — PREDNISONE 20 MG: 20 TABLET ORAL at 08:44

## 2017-04-20 RX ADMIN — DIPHENHYDRAMINE HYDROCHLORIDE 12.5 MG: 50 INJECTION, SOLUTION INTRAMUSCULAR; INTRAVENOUS at 19:32

## 2017-04-20 RX ADMIN — VANCOMYCIN HYDROCHLORIDE 2000 MG: 500 INJECTION, POWDER, LYOPHILIZED, FOR SOLUTION INTRAVENOUS at 15:00

## 2017-04-20 RX ADMIN — BRIMONIDINE TARTRATE, TIMOLOL MALEATE 1 DROP: 2; 5 SOLUTION/ DROPS OPHTHALMIC at 20:10

## 2017-04-20 RX ADMIN — HYDROMORPHONE HYDROCHLORIDE 0.5 MG: 1 INJECTION, SOLUTION INTRAMUSCULAR; INTRAVENOUS; SUBCUTANEOUS at 17:18

## 2017-04-20 RX ADMIN — ENOXAPARIN SODIUM 40 MG: 40 INJECTION SUBCUTANEOUS at 08:43

## 2017-04-20 RX ADMIN — MORPHINE SULFATE 30 MG: 30 TABLET, EXTENDED RELEASE ORAL at 08:44

## 2017-04-20 RX ADMIN — BUPROPION HYDROCHLORIDE 150 MG: 150 TABLET, FILM COATED, EXTENDED RELEASE ORAL at 20:11

## 2017-04-20 NOTE — PROGRESS NOTES
Discharge Planning Assessment   [facility]     Patient Name: Demi Meyer  MRN: [id number]  Today's Date: 4/20/2017    Admit Date: 4/18/2017          Discharge Needs Assessment       04/20/17 0955    Discharge Needs Assessment    Equipment Currently Used at Home --   also has a blood pressure cuff at home      04/20/17 0953    Living Environment    Lives With child(mandy), adult;spouse    Living Arrangements house    Provides Primary Care For no one    Quality Of Family Relationships supportive;helpful;involved    Discharge Needs Assessment    Equipment Currently Used at Home cane, straight    Transportation Available car;family or friend will provide            Discharge Plan       04/20/17 0963    Case Management/Social Work Plan    Plan Discharge needs assessment    Patient/Family In Agreement With Plan yes    Additional Comments SW met with pt at bedside in ICU for an initial discharge assessment. Pt's wife was also present at bedside. Pt states that he lives with his wife and adult son in their house. Before admission he was independent with ADL's. He has a straight cane at home that he rarely uses. He denies affiliation with  and is not on home oxygen. Pt denies any discharge needs at this time. Advised that CM would continue to follow should needs change.    Final Note    Final Note Will continue to follow for discharge needs.        Discharge Placement     No information found                Demographic Summary     None            Functional Status     None            Psychosocial     None            Abuse/Neglect     None            Legal     None            Substance Abuse     None            Patient Forms     None          EDILBERTO Pizano  04/20/17  10:05 AM

## 2017-04-20 NOTE — PLAN OF CARE
Problem: Patient Care Overview (Adult)  Goal: Plan of Care Review  Outcome: Ongoing (interventions implemented as appropriate)    04/20/17 1346   Coping/Psychosocial Response Interventions   Plan Of Care Reviewed With patient;spouse   Patient Care Overview   Progress improving       Goal: Adult Individualization and Mutuality  Outcome: Ongoing (interventions implemented as appropriate)  Goal: Discharge Needs Assessment  Outcome: Ongoing (interventions implemented as appropriate)    Problem: Pain, Acute (Adult)  Goal: Identify Related Risk Factors and Signs and Symptoms  Outcome: Ongoing (interventions implemented as appropriate)  Goal: Acceptable Pain Control/Comfort Level  Outcome: Ongoing (interventions implemented as appropriate)

## 2017-04-20 NOTE — PROGRESS NOTES
INPATIENT PROGRESS NOTE    Date of Admission: 4/18/2017  Length of Stay: 2  Primary Care Physician: Angelita Lu MD    Subjective   HPI:  Mr. Meyer is a 56 year old male who presents to the [facility] ED with complaints of diffuse joint pain, significantly worse in his left shoulder, fevers, and MRSA UTI.  He met sepsis criteria in the ED and has been admitted for further treatment.      Interval History:  04/20/17 - Patient seen and examined.  Continues to have fevers.  TTE with probable vegetation on aortic valve, FANG tomorrow.  Denies any new concerns, no N/V/D.  Appetite fair. No dyspnea, chest pain. + arthralgias.        Objective      Temp:  [100 °F (37.8 °C)-102.3 °F (39.1 °C)] 100.2 °F (37.9 °C)  Heart Rate:  [] 80  Resp:  [8-18] 14  BP: ()/(60-80) 110/69    Gen: Alert, appropriate, pleasant and interactive  HEENT: EOMI, ATNC, MMM  Neck: Supple  Heart: S1S2, RRR  Lungs: CTA bilaterally, no wheezes, rales, or rhonchi  Abdomen: Soft, NTND, BS+  Extremities: Warm, well-perfused, + pulses  Skin: P/W/D  Neuro: A/O x3, speech clear    Results Review:    I have reviewed the labs, radiology results and diagnostic studies.      Results from last 7 days  Lab Units 04/20/17  0701 04/19/17 0517 04/18/17  1837   SODIUM mmol/L 134* 136* 133*   POTASSIUM mmol/L 3.5 3.7 4.4   CHLORIDE mmol/L 97* 96* 90*   TOTAL CO2 mmol/L 27.0 27.0 27.0   BUN mg/dL 13 15 22*   CREATININE mg/dL 0.80 0.90 1.70*   CALCIUM mg/dL 7.4* 7.6* 8.9   BILIRUBIN mg/dL 1.0 0.7 1.2   ALK PHOS U/L 104 97 128*   ALT (SGPT) U/L 104* 78* 66   AST (SGOT) U/L 107* 56* 39   GLUCOSE mg/dL 132* 213* 150*         Results from last 7 days  Lab Units 04/20/17  0701 04/19/17  0517 04/18/17  1837   WBC 10*3/mm3 16.89* 19.74* 22.81*   HEMOGLOBIN g/dL 13.3* 13.8* 16.6   HEMATOCRIT % 39.4* 41.6* 49.3   PLATELETS 10*3/mm3 208 199 216   MONOCYTES % % 5.0 2.0 7.0         Results from last 7 days  Lab Units 04/20/17  0717 04/19/17  0517 04/18/17  1837   INR  1.69*  1.62* 1.49*      Microbiology Results (last 10 days)     Procedure Component Value - Date/Time    Rapid Strep A Screen [[id number]]  (Normal) Collected:  04/19/17 0730    Lab Status:  Final result Specimen:  Swab from Throat Updated:  04/19/17 0754     Strep A Ag Negative    Blood Culture With MCKAYLA [[id number]]  (Abnormal) Collected:  04/19/17 0517    Lab Status:  Preliminary result Specimen:  Blood from Hand, Right Updated:  04/20/17 0557     Blood Culture Abnormal Stain (A)      Staphylococcus aureus (A)      ARMANDO to follow.    Consider infectious disease consult to rule out distant focus of infection.        Isolated from Anaerobic Bottle     Gram Stain Result Anaerobic Bottle Gram positive cocci in clusters    Blood Culture With MCKAYLA [[id number]]  (Abnormal) Collected:  04/19/17 0510    Lab Status:  Preliminary result Specimen:  Blood from Hand, Right Updated:  04/20/17 0555     Blood Culture Abnormal Stain (A)      Staphylococcus aureus (A)      ARMANDO to follow.    Consider infectious disease consult to rule out distant focus of infection.        Isolated from Pediatric Bottle     Gram Stain Result Pediatric Bottle Gram positive cocci in clusters    VRE Culture [[id number]]  (Normal) Collected:  04/18/17 2201    Lab Status:  Final result Specimen:  Swab from Per Rectum Updated:  04/20/17 0619     VRE SCREEN CX No Vancomycin Resistant Enterococcus Isolated    Influenza Antigen [[id number]]  (Normal) Collected:  04/18/17 2033    Lab Status:  Final result Specimen:  Swab from Nasopharynx Updated:  04/18/17 2057     Influenza A Ag, EIA Negative     Influenza B Ag, EIA Negative    Blood Culture [[id number]]  (Abnormal) Collected:  04/18/17 1900    Lab Status:  Preliminary result Specimen:  Blood from Hand, Right Updated:  04/20/17 0552     Blood Culture Abnormal Stain (A)      Staphylococcus aureus (A)      ARMANDO to follow.    Consider infectious disease consult to rule out distant focus of infection.        Isolated from Aerobic and  Anaerobic Bottles     Gram Stain Result Aerobic Bottle Gram positive cocci in clusters      Anaerobic Bottle Gram positive cocci in clusters    Blood Culture [[id number]]  (Abnormal) Collected:  04/18/17 1850    Lab Status:  Preliminary result Specimen:  Blood from Arm, Right Updated:  04/20/17 0553     Blood Culture Abnormal Stain (A)      Staphylococcus aureus (A)      ARMANDO to follow.    Consider infectious disease consult to rule out distant focus of infection.        Isolated from Aerobic and Anaerobic Bottles     Gram Stain Result Aerobic Bottle Gram positive cocci in clusters      Anaerobic Bottle Gram positive cocci in clusters    Influenza Antigen [[id number]]  (Normal) Collected:  04/13/17 1838    Lab Status:  Final result Specimen:  Swab from Nasopharynx Updated:  04/13/17 1857     Influenza A Ag, EIA Negative     Influenza B Ag, EIA Negative    Urine Culture [[id number]]  (Abnormal)  (Susceptibility) Collected:  04/13/17 1837    Lab Status:  Final result Specimen:  Urine from Urine, Clean Catch Updated:  04/18/17 1307     Urine Culture 10,000-20,000 CFU/mL Staphylococcus aureus, MRSA (C)        Methicillin resistant Staph aureus, patient may be an isolation risk.       Susceptibility      Staphylococcus aureus, MRSA     ARMANDO     Amoxicillin + Clavulanate >4/2 ug/ml Resistant     Ampicillin >8 ug/ml Resistant     Ampicillin + Sulbactam 16/8 ug/ml Resistant     Cefazolin 16 ug/ml Resistant     Ciprofloxacin >2 ug/ml Resistant     Daptomycin <=0.5 ug/ml Susceptible     Gentamicin <=4 ug/ml Susceptible     Levofloxacin >4 ug/ml Resistant     Linezolid 4 ug/ml Susceptible     Nitrofurantoin <=32 ug/ml Susceptible     Oxacillin >2 ug/ml Resistant     Penicillin G >8 ug/ml Resistant     Quinupristin + Dalfopristin <=1 ug/ml Susceptible     Rifampin <=1 ug/ml Susceptible     Tetracycline <=4 ug/ml Susceptible     Trimethoprim + Sulfamethoxazole <=0.5/9.5 ug/ml Susceptible     Vancomycin 2 ug/ml Susceptible                             I have reviewed the medications.      Current Facility-Administered Medications:   •  acetaminophen (TYLENOL) tablet 650 mg, 650 mg, Oral, Q6H PRN, Janett Pabon DO, 650 mg at 04/20/17 1309  •  aspirin chewable tablet 324 mg, 324 mg, Oral, Daily, Camden Mcdonald MD, 324 mg at 04/20/17 0843  •  brimonidine-timolol (COMBIGAN) 0.2-0.5 % ophthalmic solution 1 drop, 1 drop, Both Eyes, Q12H, Umesh Cross MD  •  buPROPion SR (WELLBUTRIN SR) 12 hr tablet 150 mg, 150 mg, Oral, Q12H, Janett Pabon DO, 150 mg at 04/20/17 0845  •  ceftaroline (TEFLARO) 600 mg/100 mL 0.9% NS (mbp), 600 mg, Intravenous, Q12H, Camden Mcdonald MD  •  dextrose (D50W) solution 25 g, 25 g, Intravenous, Q15 Min PRN, Janett Pabon DO  •  dextrose (INSTA-GLUCOSE) 77.4 % gel 1 tube, 1 tube, Oral, Q15 Min PRN, Janett Pabon DO  •  diazePAM (VALIUM) tablet 5 mg, 5 mg, Oral, Q12H PRN, Janett Pabon DO, 5 mg at 04/18/17 2309  •  diazePAM (VALIUM) tablet 5 mg, 5 mg, Oral, Q8H, Camden Mcdonald MD, 5 mg at 04/20/17 1718  •  diphenhydrAMINE (BENADRYL) injection 12.5 mg, 12.5 mg, Intravenous, Once, Camden Mcdonald MD  •  [START ON 4/21/2017] diphenhydrAMINE (BENADRYL) injection 12.5 mg, 12.5 mg, Intravenous, Q6H PRN, Camden Mcdonald MD  •  enoxaparin (LOVENOX) syringe 40 mg, 40 mg, Subcutaneous, Daily, Janett Pabon DO, 40 mg at 04/20/17 0843  •  famotidine (PEPCID) tablet 20 mg, 20 mg, Oral, BID, Janett Pabon DO, 20 mg at 04/20/17 1718  •  glucagon (human recombinant) (GLUCAGEN DIAGNOSTIC) injection 1 mg, 1 mg, Subcutaneous, Q15 Min PRN, Janett Pabon DO  •  HYDROmorphone (DILAUDID) injection 0.5 mg, 0.5 mg, Intravenous, Q4H PRN, Camden Mcdonald MD, 0.5 mg at 04/20/17 1718  •  insulin aspart (novoLOG) injection 0-7 Units, 0-7 Units, Subcutaneous, 4x Daily AC & at Bedtime, Janett Pabon DO  •  ipratropium-albuterol (DUO-NEB) nebulizer solution 3 mL, 3 mL, Nebulization, Q6H PRN, Janett SHER  DO Cm  •  LORazepam (ATIVAN) injection 0.5 mg, 0.5 mg, Intravenous, Q4H PRN, Camden Mcdonald MD  •  metoprolol tartrate (LOPRESSOR) tablet 12.5 mg, 12.5 mg, Oral, Q6H, Patrice Parrish MD, 12.5 mg at 04/20/17 1413  •  Morphine (MS CONTIN) 12 hr tablet 30 mg, 30 mg, Oral, Q12H, Camden Mcdonald MD, 30 mg at 04/20/17 0844  •  oxyCODONE-acetaminophen (PERCOCET) 5-325 MG per tablet 1 tablet, 1 tablet, Oral, Q6H PRN, Janett Pabon DO, 1 tablet at 04/20/17 0650  •  vancomycin 2000 mg in sodium chloride 0.9% 500 mL IVPB, 2,000 mg, Intravenous, Q12H, 2,000 mg at 04/20/17 1500 **AND** Pharmacy to dose vancomycin, , Does not apply, Continuous PRN, Camden Mcdonald MD  •  phenazopyridine (PYRIDIUM) tablet 200 mg, 200 mg, Oral, TID PRN, Janett Pabon DO, 200 mg at 04/19/17 0925  •  predniSONE (DELTASONE) tablet 20 mg, 20 mg, Oral, Daily, Janett Pabon DO, 20 mg at 04/20/17 0844  •  promethazine (PHENERGAN) tablet 12.5 mg, 12.5 mg, Oral, Q6H PRN, Janett Pabon DO, 12.5 mg at 04/20/17 0412  •  rifAMPin (RIFADIN) capsule 300 mg, 300 mg, Oral, Q12H, Camden Mcdonald MD  •  sodium chloride 0.9 % flush 1-10 mL, 1-10 mL, Intravenous, PRN, Janett Pabon DO  •  sodium chloride 0.9 % flush 10 mL, 10 mL, Intravenous, PRN, Moises Meyer MD, 10 mL at 04/18/17 1933  •  sodium chloride 0.9 % infusion, 75 mL/hr, Intravenous, Continuous, Camden Mcdonald MD, Last Rate: 75 mL/hr at 04/20/17 0945, 75 mL/hr at 04/20/17 0945  •  tamsulosin (FLOMAX) 24 hr capsule 0.4 mg, 0.4 mg, Oral, Nightly, Janett Pabon DO, 0.4 mg at 04/19/17 2040    Assessment/Plan     Problem List  Principal Problem:    Severe sepsis due to methicillin resistant Staphylococcus aureus (MRSA) with acute organ dysfunction  Active Problems:    UTI (urinary tract infection), bacterial    Acute kidney injury    Dehydration with hyponatremia    Acute gout of right ankle    Acute maxillary sinusitis    Elevated LFTs     Elevated INR    Gastroesophageal reflux disease    Anxiety    Primary osteoarthritis involving multiple joints    Essential hypertension    Headache    Mild intermittent asthma without complication    Abdominal pain, lower    Shoulder pain    Acute right ankle pain      Assessment/Plan    Disseminated MRSA infection  - repeat cultures with S. Aureus very rapidly positive.  - continue antibiotics with addition of rifampin and teflaro for objective and clinical worsening on vanc.    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

## 2017-04-20 NOTE — PROGRESS NOTES
"      [facility]  PROGRESS NOTE    Name:  Demi Meyer   Age:  56 y.o.  Sex:  male  :  1960  MRN:  [id number]   Visit Number:  [id number]  Admission Date:  2017  5:50 PM  Date Of Service:  17  Primary Care Physician:  Angelita Lu MD     LOS: 2 days :  Patient Care Team:  Angelita Lu MD as PCP - General:    Chief Complaint:      \" I still feel yucky but I am feeling better than yesterday\"    Subjective / Interval History:     Patient is currently in ICU for severe sepsis.   He was seen by ortho initially 17 for multiple arthralgias ( Right ankle and bilateral shoulder pain). He was complaining of left shoulder > right shoulder pain. An attempt for left shoulder arthrocentesis was performed with no fluid to culture. Dr. Leija evaluated the patient after he was sent to ED due to abnormal labs.   Dr. Leija did not feel the patient had Septic joint. Rather we were concerned for gout as he has a strong family hx of gout.           Review of Systems:     General ROS: + Chills  Ophthalmic ROS: no acute visual disturbance.  ENT ROS: No sinus congestion or sore throat.   Respiratory ROS: No shortness of breath.   Cardiovascular ROS: No chest pain or palpitations.  Gastrointestinal ROS: No acute abdominal change.  Genito-Urinary ROS: + complains of orange urine ( patient has been on Pyridium prior to admission)  Musculoskeletal ROS: + Right ankle pain, bilateral shoulder pain (aching and stabbing pain that is intermittent) No deep calf pain. No acute focal motor deficit  Neurological ROS: No cyanosis, no new numbness or tingling.  Dermatological ROS: redness to the right ankle.    Vital Signs:    Temp:  [100 °F (37.8 °C)-102.3 °F (39.1 °C)] 100 °F (37.8 °C)  Heart Rate:  [] 91  Resp:  [8-18] 14  BP: ()/(68-84) 105/77    Intake and output:    I/O last 3 completed shifts:  In: 8981 [P.O.:1460; I.V.:6271; IV Piggyback:1250]  Out: 5870 [Urine:5870]       Physical " Examination:    General Appearance:    Alert and cooperative   Head:    Atraumatic and normocephalic, without obvious abnormality.   Eyes:    PERRLA.  Extraocular movements are within normal limits.   ENT:   No acute change.   Neck:   Supple, trachea midline. No nuchal rigidity.   Lungs:     Normal respirations, unlabored.    Heart:    Regular rate.   Abdomen:     Soft non-tender.   Extremities:   +TTP to the AC joint of bilateral shoulder. Patient is able to int/ext rotate with no pain. He states only with hyperabduction to both shoulders would he experience camden.  No new motor deficit, no calf pain, Tamara sign negative.   Skin:    Right ankle shows mild blanching erythema along dorsolateral aspect as well as posteriomedial aspect. No abscess or streaking. He has normal ROM to the right ankle ( although this is painful) good brisk cap refill to right foot.   Neurologic:   Sensation intact, pulses intact.     Laboratory results:    Lab Results (last 24 hours)     Procedure Component Value Units Date/Time    POC Glucose Fingerstick [[id number]]  (Abnormal) Collected:  04/19/17 1040    Specimen:  Blood Updated:  04/19/17 1051     Glucose 145 (H) mg/dL       Serial Number: [id number]    : [id number]       POC Glucose Fingerstick [[id number]]  (Abnormal) Collected:  04/19/17 1541    Specimen:  Blood Updated:  04/19/17 1550     Glucose 135 (H) mg/dL       Serial Number: [id number]    : [id number]       Troponin [[id number]]  (Abnormal) Collected:  04/19/17 1747    Specimen:  Blood Updated:  04/19/17 1827     Troponin I 0.483 (C) ng/mL       Specimen hemolyzed.  Results may be affected.       Narrative:       Normal Patient Upper Reference Limit (URL) (99th Percentile)=0.03 ng/mL   Non-AMI Illness Reference Limit=0.03-0.11 ng/mL   AMI Confirmation=0.12 ng/mL and above    POC Glucose Fingerstick [[id number]]  (Normal) Collected:  04/19/17 2053    Specimen:  Blood Updated:  04/19/17 2100     Glucose 122 mg/dL       Serial  Number: [id number]    : [id number]       BNP [[id number]]  (Abnormal) Collected:  04/19/17 2019    Specimen:  Blood Updated:  04/19/17 2105     proBNP 964.0 (C) pg/mL     Blood Culture [[id number]]  (Abnormal) Collected:  04/18/17 1900    Specimen:  Blood from Hand, Right Updated:  04/20/17 0552     Blood Culture Abnormal Stain (A)      Staphylococcus aureus (A)      ARMANDO to follow.    Consider infectious disease consult to rule out distant focus of infection.        Isolated from Aerobic and Anaerobic Bottles     Gram Stain Result Aerobic Bottle Gram positive cocci in clusters      Anaerobic Bottle Gram positive cocci in clusters    Blood Culture [[id number]]  (Abnormal) Collected:  04/18/17 1850    Specimen:  Blood from Arm, Right Updated:  04/20/17 0553     Blood Culture Abnormal Stain (A)      Staphylococcus aureus (A)      ARMANDO to follow.    Consider infectious disease consult to rule out distant focus of infection.        Isolated from Aerobic and Anaerobic Bottles     Gram Stain Result Aerobic Bottle Gram positive cocci in clusters      Anaerobic Bottle Gram positive cocci in clusters    Blood Culture With MCKAYLA [[id number]]  (Abnormal) Collected:  04/19/17 0510    Specimen:  Blood from Hand, Right Updated:  04/20/17 0555     Blood Culture Abnormal Stain (A)      Staphylococcus aureus (A)      ARMANDO to follow.    Consider infectious disease consult to rule out distant focus of infection.        Isolated from Pediatric Bottle     Gram Stain Result Pediatric Bottle Gram positive cocci in clusters    Blood Culture With MCKAYLA [[id number]]  (Abnormal) Collected:  04/19/17 0517    Specimen:  Blood from Hand, Right Updated:  04/20/17 0557     Blood Culture Abnormal Stain (A)      Staphylococcus aureus (A)      ARMANDO to follow.    Consider infectious disease consult to rule out distant focus of infection.        Isolated from Anaerobic Bottle     Gram Stain Result Anaerobic Bottle Gram positive cocci in clusters    POC Glucose  Fingerstick [[id number]]  (Abnormal) Collected:  04/20/17 0602    Specimen:  Blood Updated:  04/20/17 0605     Glucose 135 (H) mg/dL       Serial Number: [id number]    : [id number]       VRE Culture [[id number]]  (Normal) Collected:  04/18/17 2201    Specimen:  Swab from Per Rectum Updated:  04/20/17 0619     VRE SCREEN CX No Vancomycin Resistant Enterococcus Isolated    CBC & Differential [[id number]] Collected:  04/20/17 0701    Specimen:  Blood Updated:  04/20/17 0713    Narrative:       The following orders were created for panel order CBC & Differential.  Procedure                               Abnormality         Status                     ---------                               -----------         ------                     Manual Differential[[id number]]                               In process                 CBC Auto Differential[[id number]]                             In process                   Please view results for these tests on the individual orders.    CBC Auto Differential [[id number]] Collected:  04/20/17 0701    Specimen:  Blood Updated:  04/20/17 0713    Manual Differential [[id number]] Collected:  04/20/17 0701    Specimen:  Blood Updated:  04/20/17 0713    Comprehensive Metabolic Panel [[id number]] Collected:  04/20/17 0701    Specimen:  Blood Updated:  04/20/17 0713    Magnesium [[id number]]  (Normal) Collected:  04/20/17 0701    Specimen:  Blood Updated:  04/20/17 0754     Magnesium 2.0 mg/dL     Phosphorus [[id number]]  (Normal) Collected:  04/20/17 0701    Specimen:  Blood Updated:  04/20/17 0754     Phosphorus 2.8 mg/dL     Comprehensive Metabolic Panel [[id number]] Collected:  04/20/17 0757    Specimen:  Blood Updated:  04/20/17 0758    Magnesium [[id number]] Collected:  04/20/17 0757    Specimen:  Blood Updated:  04/20/17 0758    Protime-INR [[id number]] Collected:  04/20/17 0717    Specimen:  Blood Updated:  04/20/17 0758          I have reviewed the patient's laboratory results.    Radiology  results:    Imaging Results (last 24 hours)     ** No results found for the last 24 hours. **               Assessment/Plan    Principal Problem:    Severe sepsis due to methicillin resistant Staphylococcus aureus (MRSA) with acute organ dysfunction  Active Problems:    Gastroesophageal reflux disease    Anxiety    Primary osteoarthritis involving multiple joints    Essential hypertension    Headache    UTI (urinary tract infection), bacterial    Mild intermittent asthma without complication    Abdominal pain, lower    Shoulder pain    Acute right ankle pain    Acute gout of right ankle    Acute maxillary sinusitis    Acute kidney injury    Dehydration with hyponatremia    Elevated LFTs    Elevated INR      Bilateral shoulder pain, Right ankle and foot pain:    Recommend Colchicine 1.2 mg orally X 1 dose, then 0.6 mg orally 1 hour later X 1 dose.  Ice to shoulders, and right ankle as needed for comfort and swelling.   Do not recommend Ankle or shoulder arthrocentesis at this time. Low suspicion for septic joint.     Dr. Leija will continue to follow as needed.       Michele Kay PA-C  04/20/17  8:09 AM

## 2017-04-20 NOTE — PLAN OF CARE
Problem: Patient Care Overview (Adult)  Goal: Plan of Care Review  Outcome: Ongoing (interventions implemented as appropriate)    04/19/17 3381   Coping/Psychosocial Response Interventions   Plan Of Care Reviewed With patient;spouse   Patient Care Overview   Progress improving       Goal: Adult Individualization and Mutuality  Outcome: Ongoing (interventions implemented as appropriate)    Problem: Pain, Acute (Adult)  Goal: Identify Related Risk Factors and Signs and Symptoms  Outcome: Ongoing (interventions implemented as appropriate)  Goal: Acceptable Pain Control/Comfort Level  Outcome: Ongoing (interventions implemented as appropriate)

## 2017-04-20 NOTE — PROGRESS NOTES
"Adult Nutrition  Assessment/PES    Patient Name:  Demi Meyer  YOB: 1960  MRN: [id number]  Admit Date:  4/18/2017    Assessment Date:  4/20/2017        Reason for Assessment       04/20/17 1220    Reason for Assessment    Reason For Assessment/Visit admission assessment    Identified At Risk By Screening Criteria diagnosis;weight status;BMI    Factors Affecting Nutrition Factors    Appetite Poor   PTA per pt.                Anthropometrics       04/20/17 1221    Anthropometrics    Height Method Stated    Height 177.8 cm (70\")    Weight Method Bed scale    Weight 112 kg (246 lb 14.6 oz)    Ideal Body Weight (IBW)    Ideal Body Weight (IBW), Male (kg) 76.48    % Ideal Body Weight 146.75    Body Mass Index (BMI)    BMI (kg/m2) 35.5    BMI Grade 35 - 39.9 - obesity - grade II            Labs/Tests/Procedures/Meds       04/20/17 1222    Labs/Tests/Procedures/Meds    Labs/Tests Review Reviewed   High: Glucose, ALT, AST  Low: Na, Cl, Calcium, Total Protein, Albumin, Hgb, Hct    Medication Review Reviewed, pertinent;Insulin;Antibiotic            Physical Findings       04/20/17 1223    Physical Findings/Assessment    Additional Documentation Physical Appearance (Group)    Physical Appearance    Overall Physical Appearance obese    Skin other (see comments)   healing abscess to right neck per NSG            Estimated/Assessed Needs       04/20/17 1223    Calculation Measurements    Weight Used For Calculations 76.5 kg (168 lb 9.7 oz)   IBW    Height Used for Calculations 1.778 m (5' 10\")    Estimated/Assessed Energy Needs    Energy Need Method [place]    Age 56    RMR ([place] Equation) 1611.05    Activity Factors ([place])  Out of bed, ambulatory  1.3    Estimated Kcal Range  ~7284-0933 Kcal    Estimated/Assessed Protein Needs    Weight Used for Protein Calculation 112 kg (246 lb 14.6 oz)    Protein (gm/kg) 1.2    1.2 Gm Protein (gm) 134.4    Estimated Protein Range ~89.6-134.4 gm    " Estimated/Assessed Fluid Needs    Fluid Need Method RDA method    RDA Method (mL)  2400            Nutrition Prescription Ordered       04/20/17 1226    Nutrition Prescription PO    Current PO Diet Regular    Common Modifiers Cardiac            Evaluation of Received Nutrient/Fluid Intake       04/20/17 1223    PO Evaluation    Number of Days PO Intake Evaluated 1 day    Number of Meals 1    % PO Intake 75              Problem/Interventions:        Problem 1       04/20/17 1227    Nutrition Diagnoses Problem 1    Problem 1 Overweight/Obesity    Etiology (related to) Other (comment)   over conumption of calories with limited moderate physical activity PTA    Signs/Symptoms (evidenced by) BMI    BMI 35 - 39.9            Problem 2       04/20/17 1236    Nutrition Diagnoses Problem 2    Problem 2 Increased Nutrient Needs    Macronutrient Kcal;Fluid;Protein    Micronutrient Vitamin;Mineral    Etiology (related to) Medical Diagnosis    Infectious Disease Sepsis    Signs/Symptoms (evidenced by) Other (comment)   increased demand for nutrients r/t sepsis                  Intervention Goal       04/20/17 1239    Intervention Goal    General Meet nutritional needs for age/condition    PO PO intake (%)    PO Intake % 75 %   continue with 75% p.o. intake or greater            Nutrition Intervention       04/20/17 1239    Nutrition Intervention    RD/Tech Action Encourage intake;Supplement provided;Follow Tx progress;Interview for preference;Advise available snack            Nutrition Prescription       04/20/17 1239    Nutrition Prescription PO    PO Prescription Begin/change supplement;Begin/change diet    Supplement Boost    Supplement Frequency 2 times a day    Other Modifiers Low purine    New PO Prescription Ordered? No, recommended   supplements added    Other Orders    Obtain Weight Daily    Obtain Weight Ordered? No, recommended    Supplement Vitamin mineral supplement    Supplement Ordered? No, recommended             Education/Evaluation       04/20/17 3800    Education    Education Provided education regarding;Education topics    Education Topics Low purine;Cardiac heart health;Weight management - maintain;Other (comment)   Diverticulitis/Diverticulosis, GERD    Monitor/Evaluation    Monitor Per protocol;PO intake;Supplement intake;Pertinent labs;Weight        Comments:  Rec. #1: Consider adding low purine to current diet order of regular, cardiac. Continue to encourage p.o. Intake ~75% of meals on average per NSG. Rec. #2: Consider adding MVI with minerals. RD added Boost Plus. RD to follow pt. Consult RD PRN.     Electronically signed by:  Paige Navarro RD  04/20/17 12:41 PM

## 2017-04-21 ENCOUNTER — APPOINTMENT (OUTPATIENT)
Dept: GENERAL RADIOLOGY | Facility: HOSPITAL | Age: 57
End: 2017-04-21
Attending: HOSPITALIST

## 2017-04-21 ENCOUNTER — APPOINTMENT (OUTPATIENT)
Dept: CARDIOLOGY | Facility: HOSPITAL | Age: 57
End: 2017-04-21
Attending: INTERNAL MEDICINE

## 2017-04-21 LAB
ACINETOBACTER SCREEN CX: NORMAL
ALBUMIN SERPL-MCNC: 2.8 G/DL (ref 3.5–5)
ALBUMIN/GLOB SERPL: 0.8 G/DL (ref 1–2)
ALP SERPL-CCNC: 118 U/L (ref 38–126)
ALT SERPL W P-5'-P-CCNC: 101 U/L (ref 13–69)
ANION GAP SERPL CALCULATED.3IONS-SCNC: 15 MMOL/L
AST SERPL-CCNC: 87 U/L (ref 15–46)
BACTERIA SPEC AEROBE CULT: ABNORMAL
BASOPHILS # BLD AUTO: 0.03 10*3/MM3 (ref 0–0.2)
BASOPHILS NFR BLD AUTO: 0.2 % (ref 0–2.5)
BH CV ECHO MEAS - BSA(HAYCOCK): 2.3 M^2
BH CV ECHO MEAS - BSA: 2.2 M^2
BH CV ECHO MEAS - BZI_BMI: 33 KILOGRAMS/M^2
BH CV ECHO MEAS - BZI_METRIC_HEIGHT: 177.8 CM
BH CV ECHO MEAS - BZI_METRIC_WEIGHT: 104.3 KG
BILIRUB SERPL-MCNC: 1.8 MG/DL (ref 0.2–1.3)
BUN BLD-MCNC: 15 MG/DL (ref 7–20)
BUN/CREAT SERPL: 21.4 (ref 6.3–21.9)
CALCIUM SPEC-SCNC: 7.7 MG/DL (ref 8.4–10.2)
CHLORIDE SERPL-SCNC: 98 MMOL/L (ref 98–107)
CO2 SERPL-SCNC: 27 MMOL/L (ref 26–30)
CREAT BLD-MCNC: 0.7 MG/DL (ref 0.6–1.3)
DEPRECATED RDW RBC AUTO: 48.1 FL (ref 37–54)
EOSINOPHIL # BLD AUTO: 0 10*3/MM3 (ref 0–0.7)
EOSINOPHIL NFR BLD AUTO: 0 % (ref 0–7)
ERYTHROCYTE [DISTWIDTH] IN BLOOD BY AUTOMATED COUNT: 14.1 % (ref 11.5–14.5)
GFR SERPL CREATININE-BSD FRML MDRD: 118 ML/MIN/1.73
GLOBULIN UR ELPH-MCNC: 3.3 GM/DL
GLUCOSE BLD-MCNC: 177 MG/DL (ref 74–98)
GLUCOSE BLDC GLUCOMTR-MCNC: 125 MG/DL (ref 70–130)
GLUCOSE BLDC GLUCOMTR-MCNC: 130 MG/DL (ref 70–130)
GLUCOSE BLDC GLUCOMTR-MCNC: 136 MG/DL (ref 70–130)
GLUCOSE BLDC GLUCOMTR-MCNC: 138 MG/DL (ref 70–130)
GRAM STN SPEC: ABNORMAL
HCT VFR BLD AUTO: 38 % (ref 42–52)
HGB BLD-MCNC: 12.6 G/DL (ref 14–18)
IMM GRANULOCYTES # BLD: 0.18 10*3/MM3 (ref 0–0.06)
IMM GRANULOCYTES NFR BLD: 1 % (ref 0–0.6)
ISOLATED FROM: ABNORMAL
LV EF 2D ECHO EST: 55 %
LYMPHOCYTES # BLD AUTO: 0.65 10*3/MM3 (ref 0.6–3.4)
LYMPHOCYTES NFR BLD AUTO: 3.6 % (ref 10–50)
MCH RBC QN AUTO: 30.7 PG (ref 27–31)
MCHC RBC AUTO-ENTMCNC: 33.2 G/DL (ref 30–37)
MCV RBC AUTO: 92.5 FL (ref 80–94)
MONOCYTES # BLD AUTO: 0.83 10*3/MM3 (ref 0–0.9)
MONOCYTES NFR BLD AUTO: 4.6 % (ref 0–12)
MRSA SPEC QL CULT: NORMAL
N GONORRHOEA DNA SPEC QL NAA+PROBE: NEGATIVE
NEUTROPHILS # BLD AUTO: 16.26 10*3/MM3 (ref 2–6.9)
NEUTROPHILS NFR BLD AUTO: 90.6 % (ref 37–80)
NRBC BLD MANUAL-RTO: 0 /100 WBC (ref 0–0)
PLATELET # BLD AUTO: 245 10*3/MM3 (ref 130–400)
PMV BLD AUTO: 10.7 FL (ref 6–12)
POTASSIUM BLD-SCNC: 4 MMOL/L (ref 3.5–5.1)
PROT SERPL-MCNC: 6.1 G/DL (ref 6.3–8.2)
RBC # BLD AUTO: 4.11 10*6/MM3 (ref 4.7–6.1)
SODIUM BLD-SCNC: 136 MMOL/L (ref 137–145)
VANCOMYCIN TROUGH SERPL-MCNC: <5 MCG/ML (ref 5–15)
WBC NRBC COR # BLD: 17.95 10*3/MM3 (ref 4.8–10.8)
ZIKA VIRUS MAC-ELISA (EUA): NEGATIVE

## 2017-04-21 PROCEDURE — 99231 SBSQ HOSP IP/OBS SF/LOW 25: CPT | Performed by: HOSPITALIST

## 2017-04-21 PROCEDURE — 85025 COMPLETE CBC W/AUTO DIFF WBC: CPT | Performed by: HOSPITALIST

## 2017-04-21 PROCEDURE — 82962 GLUCOSE BLOOD TEST: CPT

## 2017-04-21 PROCEDURE — 94799 UNLISTED PULMONARY SVC/PX: CPT

## 2017-04-21 PROCEDURE — 25010000002 VANCOMYCIN PER 500 MG: Performed by: HOSPITALIST

## 2017-04-21 PROCEDURE — 25010000002 DIPHENHYDRAMINE PER 50 MG: Performed by: HOSPITALIST

## 2017-04-21 PROCEDURE — 80053 COMPREHEN METABOLIC PANEL: CPT | Performed by: HOSPITALIST

## 2017-04-21 PROCEDURE — 63710000001 PREDNISONE PER 1 MG: Performed by: FAMILY MEDICINE

## 2017-04-21 PROCEDURE — 25010000002 ENOXAPARIN PER 10 MG: Performed by: FAMILY MEDICINE

## 2017-04-21 PROCEDURE — 87147 CULTURE TYPE IMMUNOLOGIC: CPT | Performed by: HOSPITALIST

## 2017-04-21 PROCEDURE — 25010000002 MIDAZOLAM PER 1 MG: Performed by: INTERNAL MEDICINE

## 2017-04-21 PROCEDURE — 71010 HC CHEST PA OR AP: CPT

## 2017-04-21 PROCEDURE — 87205 SMEAR GRAM STAIN: CPT | Performed by: HOSPITALIST

## 2017-04-21 PROCEDURE — 93320 DOPPLER ECHO COMPLETE: CPT

## 2017-04-21 PROCEDURE — 87040 BLOOD CULTURE FOR BACTERIA: CPT | Performed by: HOSPITALIST

## 2017-04-21 PROCEDURE — 94660 CPAP INITIATION&MGMT: CPT

## 2017-04-21 PROCEDURE — 25010000002 HYDROMORPHONE PER 4 MG: Performed by: HOSPITALIST

## 2017-04-21 PROCEDURE — 25010000002 MEPERIDINE PER 100 MG: Performed by: INTERNAL MEDICINE

## 2017-04-21 PROCEDURE — 93312 ECHO TRANSESOPHAGEAL: CPT

## 2017-04-21 PROCEDURE — 93325 DOPPLER ECHO COLOR FLOW MAPG: CPT

## 2017-04-21 PROCEDURE — 80202 ASSAY OF VANCOMYCIN: CPT | Performed by: HOSPITALIST

## 2017-04-21 PROCEDURE — 99254 IP/OBS CNSLTJ NEW/EST MOD 60: CPT | Performed by: INTERNAL MEDICINE

## 2017-04-21 RX ORDER — METOPROLOL SUCCINATE 50 MG/1
50 TABLET, EXTENDED RELEASE ORAL
Status: DISCONTINUED | OUTPATIENT
Start: 2017-04-22 | End: 2017-04-26 | Stop reason: HOSPADM

## 2017-04-21 RX ORDER — DIAZEPAM 5 MG/1
5 TABLET ORAL EVERY 12 HOURS
Status: DISCONTINUED | OUTPATIENT
Start: 2017-04-21 | End: 2017-04-26 | Stop reason: HOSPADM

## 2017-04-21 RX ADMIN — MIDAZOLAM HYDROCHLORIDE 5 MG: 1 INJECTION, SOLUTION INTRAMUSCULAR; INTRAVENOUS at 06:02

## 2017-04-21 RX ADMIN — BUPROPION HYDROCHLORIDE 150 MG: 150 TABLET, FILM COATED, EXTENDED RELEASE ORAL at 20:23

## 2017-04-21 RX ADMIN — DIAZEPAM 5 MG: 5 TABLET ORAL at 08:28

## 2017-04-21 RX ADMIN — VANCOMYCIN HYDROCHLORIDE 2000 MG: 500 INJECTION, POWDER, LYOPHILIZED, FOR SOLUTION INTRAVENOUS at 05:03

## 2017-04-21 RX ADMIN — VANCOMYCIN HYDROCHLORIDE 1750 MG: 500 INJECTION, POWDER, LYOPHILIZED, FOR SOLUTION INTRAVENOUS at 20:22

## 2017-04-21 RX ADMIN — MORPHINE SULFATE 30 MG: 30 TABLET, EXTENDED RELEASE ORAL at 08:27

## 2017-04-21 RX ADMIN — BRIMONIDINE TARTRATE, TIMOLOL MALEATE 1 DROP: 2; 5 SOLUTION/ DROPS OPHTHALMIC at 20:24

## 2017-04-21 RX ADMIN — MORPHINE SULFATE 30 MG: 30 TABLET, EXTENDED RELEASE ORAL at 20:22

## 2017-04-21 RX ADMIN — FAMOTIDINE 20 MG: 20 TABLET, FILM COATED ORAL at 17:34

## 2017-04-21 RX ADMIN — DIPHENHYDRAMINE HYDROCHLORIDE 12.5 MG: 50 INJECTION, SOLUTION INTRAMUSCULAR; INTRAVENOUS at 07:33

## 2017-04-21 RX ADMIN — MEPERIDINE HYDROCHLORIDE 50 MG: 50 INJECTION, SOLUTION INTRAMUSCULAR; INTRAVENOUS; SUBCUTANEOUS at 06:01

## 2017-04-21 RX ADMIN — METOPROLOL TARTRATE 12.5 MG: 25 TABLET ORAL at 08:27

## 2017-04-21 RX ADMIN — PREDNISONE 20 MG: 20 TABLET ORAL at 08:27

## 2017-04-21 RX ADMIN — VANCOMYCIN HYDROCHLORIDE 1750 MG: 500 INJECTION, POWDER, LYOPHILIZED, FOR SOLUTION INTRAVENOUS at 11:22

## 2017-04-21 RX ADMIN — CEFTAROLINE FOSAMIL 600 MG: 600 POWDER, FOR SOLUTION INTRAVENOUS at 20:22

## 2017-04-21 RX ADMIN — HYDROMORPHONE HYDROCHLORIDE 0.5 MG: 1 INJECTION, SOLUTION INTRAMUSCULAR; INTRAVENOUS; SUBCUTANEOUS at 22:16

## 2017-04-21 RX ADMIN — DIAZEPAM 5 MG: 5 TABLET ORAL at 01:22

## 2017-04-21 RX ADMIN — BUPROPION HYDROCHLORIDE 150 MG: 150 TABLET, FILM COATED, EXTENDED RELEASE ORAL at 08:27

## 2017-04-21 RX ADMIN — BRIMONIDINE TARTRATE, TIMOLOL MALEATE 1 DROP: 2; 5 SOLUTION/ DROPS OPHTHALMIC at 08:31

## 2017-04-21 RX ADMIN — CEFTAROLINE FOSAMIL 600 MG: 600 POWDER, FOR SOLUTION INTRAVENOUS at 07:33

## 2017-04-21 RX ADMIN — DIAZEPAM 5 MG: 5 TABLET ORAL at 20:23

## 2017-04-21 RX ADMIN — ASPIRIN 81 MG 324 MG: 81 TABLET ORAL at 08:28

## 2017-04-21 RX ADMIN — RIFAMPIN 300 MG: 300 CAPSULE ORAL at 08:27

## 2017-04-21 RX ADMIN — HYDROMORPHONE HYDROCHLORIDE 0.5 MG: 1 INJECTION, SOLUTION INTRAMUSCULAR; INTRAVENOUS; SUBCUTANEOUS at 08:29

## 2017-04-21 RX ADMIN — FAMOTIDINE 20 MG: 20 TABLET, FILM COATED ORAL at 08:28

## 2017-04-21 RX ADMIN — METOPROLOL TARTRATE 12.5 MG: 25 TABLET ORAL at 15:55

## 2017-04-21 RX ADMIN — HYDROMORPHONE HYDROCHLORIDE 0.5 MG: 1 INJECTION, SOLUTION INTRAMUSCULAR; INTRAVENOUS; SUBCUTANEOUS at 17:44

## 2017-04-21 RX ADMIN — SODIUM CHLORIDE 75 ML/HR: 9 INJECTION, SOLUTION INTRAVENOUS at 00:08

## 2017-04-21 RX ADMIN — TAMSULOSIN HYDROCHLORIDE 0.4 MG: 0.4 CAPSULE ORAL at 20:22

## 2017-04-21 RX ADMIN — ENOXAPARIN SODIUM 40 MG: 40 INJECTION SUBCUTANEOUS at 08:28

## 2017-04-21 RX ADMIN — RIFAMPIN 300 MG: 300 CAPSULE ORAL at 20:22

## 2017-04-21 RX ADMIN — METOPROLOL TARTRATE 12.5 MG: 25 TABLET ORAL at 03:37

## 2017-04-21 NOTE — PROGRESS NOTES
"   LOS: 2 days   Patient Care Team:  Angelita Lu MD as PCP - General      Interval History: The patient looks significantly better.  Feels less toxic.  The skin lesions are less tender.        Review of Systems:   Pertinent items are noted in HPI.      Objective     Vital Sign Min/Max for last 24 hours  Temp  Min: 100 °F (37.8 °C)  Max: 102.2 °F (39 °C)   BP  Min: 89/68  Max: 118/79   Pulse  Min: 77  Max: 101   Resp  Min: 8  Max: 18   SpO2  Min: 87 %  Max: 93 %   Flow (L/min)  Min: 4  Max: 9   Weight  Min: 246 lb 1.6 oz (112 kg)  Max: 246 lb 14.6 oz (112 kg)     Flowsheet Rows         First Filed Value    Admission Height  70\" (177.8 cm) Documented at 04/18/2017 1740    Admission Weight  230 lb (104 kg) Documented at 04/18/2017 1740          Physical Exam:     General Appearance:    Alert, cooperative, in no acute distress   Head:    Normocephalic, without obvious abnormality, atraumatic   Eyes:            Lids and lashes normal, conjunctivae and sclerae normal, no   icterus, no pallor, corneas clear, PERRLA   Ears:    Ears appear intact with no abnormalities noted   Throat:   No oral lesions, no thrush, oral mucosa moist   Neck:   No adenopathy, supple, trachea midline, no thyromegaly, no   carotid bruit, no JVD   Back:     No kyphosis present, no scoliosis present, no skin lesions,      erythema or scars, no tenderness to percussion or                   palpation,   range of motion normal   Lungs:     Clear to auscultation,respirations regular, even and                  unlabored    Heart:    Regular rhythm and normal rate, normal S1 and S2, no            murmur, no gallop, no rub, no click   Chest Wall:    No abnormalities observed   Abdomen:     Normal bowel sounds, no masses, no organomegaly, soft        non-tender, non-distended, no guarding, no rebound                tenderness   Rectal:     Deferred   Extremities:   Moves all extremities well, no edema, no cyanosis, no             redness   Pulses:   " Pulses palpable and equal bilaterally   Skin:  palmar erythema much improved.  Janeway's lesions or slow node much better.     Lymph nodes:   No palpable adenopathy   Neurologic:   Cranial nerves 2 - 12 grossly intact, sensation intact, DTR       present and equal bilaterally        Results Review:     I reviewed the patient's new clinical results.    Results Review:   I reviewed the patient's new clinical results.    Telemetry to say it seems to be better controlled.    LAB DATA :           Laboratory results:      Results from last 7 days  Lab Units 04/20/17  0701 04/19/17 0517 04/18/17 1837 04/18/17 1622 04/16/17  0154   SODIUM mmol/L 134* 136* 133* 136* 133   POTASSIUM mmol/L 3.5 3.7 4.4 3.9 4.1   CHLORIDE mmol/L 97* 96* 90* 90* 96*   TOTAL CO2 mmol/L 27.0 27.0 27.0 33.0* 29.0   BUN mg/dL 13 15 22* 18 10   CREATININE mg/dL 0.80 0.90 1.70* 1.70* 0.80   CALCIUM mg/dL 7.4* 7.6* 8.9 9.0 9.4   BILIRUBIN mg/dL 1.0 0.7 1.2  --  0.4   ALK PHOS U/L 104 97 128*  --  96   ALT (SGPT) U/L 104* 78* 66  --  85*   AST (SGOT) U/L 107* 56* 39  --  63*   GLUCOSE mg/dL 132* 213* 150* 160* 160*       Results from last 7 days  Lab Units 04/20/17  0701 04/19/17 0517 04/18/17 1837 04/18/17 1622 04/16/17  0154   WBC 10*3/mm3 16.89* 19.74* 22.81* 22.07* 13.01*   HEMOGLOBIN g/dL 13.3* 13.8* 16.6 15.9 16.5   HEMATOCRIT % 39.4* 41.6* 49.3 48.4 49.8   PLATELETS 10*3/mm3 208 199 216 222 197       Results from last 7 days  Lab Units 04/20/17 0717 04/19/17 0517 04/18/17 1837   INR  1.69* 1.62* 1.49*       Results from last 7 days  Lab Units 04/19/17  0517 04/19/17  0510 04/18/17  1900 04/18/17  1850   BLOODCX  Abnormal Stain*  Staphylococcus aureus* Abnormal Stain*  Staphylococcus aureus* Abnormal Stain*  Staphylococcus aureus* Abnormal Stain*  Staphylococcus aureus*           Results from last 7 days  Lab Units 04/19/17  1747  04/18/17  1837   CK TOTAL U/L  --   --  72   TROPONIN I ng/mL 0.483*  < > <0.012   < > = values in this  interval not displayed.            Results from last 7 days  Lab Units 04/18/17 2008   PH, ARTERIAL pH units 7.541   PCO2, ARTERIAL mm Hg 32.8*   PO2 ART mm Hg 46.5*   HCO3 ART mmol/L 28.1*   BASE EXCESS ART mmol/L 5.6   HEMOGLOBIN, ARTERIAL g/dL 15.5   OXYHEMOGLOBIN % 85.8*   METHEMOGLOBIN % 0.4   CARBOXYHEMOGLOBIN % 1.7   MODALITY  Room air   FIO2 % 21     Lab Results   Component Value Date    HGBA1C 6.6 (H) 04/18/2017       Results from last 7 days  Lab Units 04/18/17  2259   TSH mIU/mL 1.180       Results from last 7 days  Lab Units 04/16/17  0154   LIPASE U/L 51       IMAGING DATA:     Ct Abdomen Pelvis Without Contrast    Result Date: 4/18/2017  Narrative: FINAL REPORT TECHNIQUE: Axial images through the abdomen and pelvis were obtained by computed tomography.  IV contrast was withheld. CLINICAL HISTORY: RLQ PAIN FINDINGS: Abdomen:  The gallbladder, solid abdominal organs and ureters are normal. The GI tract is unremarkable. The appendix appears to have been removed. There is no evidence of appendicitis in any case. Pelvis: The urinary bladder is unremarkable. There are postoperative and degenerative changes in the lumbar spine. There is no pelvic or abdominal ascites, adenopathy or acute osseous abnormality.     Impression: No acute disease. Authenticated by Jonnie Aleman M.D. on 04/18/2017 09:19:16 PM    Xr Chest 2 View    Result Date: 4/17/2017  Narrative:  EXAMINATION: XR CHEST 2 VW - 04/16/2017  INDICATION: Chest pain.  COMPARISON: 09/28/2016  FINDINGS: Two-views chest reveal the cardiomediastinal silhouettes to be within normal limits. The lung fields are grossly clear. No pleural effusion or pneumothorax. Minimal degenerative change is seen within the spine. Pulmonary vascularity is within normal limits.      Impression: Stable chest. No acute cardiopulmonary disease.  FAX TO: [facility]  DICTATED:     04/16/2017 EDITED:         04/16/2017  This report was finalized on 4/17/2017 11:10 AM by Dr. Josephine Cash,  MD.      Xr Shoulder 2+ View Left    Result Date: 4/17/2017  Narrative:  EXAMINATION: XR SHOULDER 2+ VW LEFT - 04/16/2017  INDICATION: Pain.  COMPARISON: None.  FINDINGS: Two-views left shoulder reveal no fracture or dislocation. Cortex is intact. Joint spaces are preserved. No soft tissue abnormality identified. No fracture or dislocation.         Impression: No acute bony abnormality identified.  FAX TO: A  DICTATED:     04/16/2017 EDITED:         04/16/2017  This report was finalized on 4/17/2017 11:10 AM by Dr. Josephine Cash MD.      Ct Head Without Contrast    Result Date: 4/18/2017  Narrative: FINAL REPORT TECHNIQUE: Routine axial images through the head were obtained without contrast. CLINICAL HISTORY: PAIN, NO INJURY FINDINGS: The ventricles are normal. There is no mass or other abnormal hypodensity. There is no shift of midline structures. There is no intracranial hemorrhage. There are postoperative changes in the left orbit and an air-fluid level in the right maxillary sinus  that may be due to acute on chronic sinusitis. The sinuses are otherwise clear and no acute osseous abnormality is seen.     Impression: Possible acute on chronic sinusitis. No intracranial abnormality. Authenticated by Jonnie Aleman M.D. on 04/18/2017 08:47:48 PM    Ct Chest Without Contrast    Result Date: 4/18/2017  Narrative: FINAL REPORT TECHNIQUE: Routine axial images were obtained from the lung apices to below the diaphragm without contrast. CLINICAL HISTORY: BILATERAL SHOULDER PAIN, CHEST PAIN, NO INJURY FINDINGS: Other than slight bibasilar atelectasis, the lungs are clear. The heart and vasculature are unremarkable. There is no pleural disease, adenopathy, or significant osseous abnormality.     Impression: No significant abnormality. Authenticated by Jonnie Aleman M.D. on 04/18/2017 08:48:33 PM    Ct Cervical Spine Without Contrast    Result Date: 4/18/2017  Narrative: FINAL REPORT TECHNIQUE: Thin section axial images were  obtained through the cervical spine without contrast.  Coronal and sagittal reconstructed images were then provided. CLINICAL HISTORY: NECK PAIN, NO INJURY FINDINGS: There is normal alignment and curvature. There is no fracture. There are moderate changes of degenerative disc disease from C4-C7, but no significant posterior osteophytosis. There is no significant neuroforaminal narrowing at any level.     Impression: Moderate degenerative changes in the lower cervical spine, otherwise no significant abnormality. Authenticated by Jonnie Aleman M.D. on 04/18/2017 08:46:37 PM    Xr Chest 1 View    Result Date: 4/19/2017  Narrative: PROCEDURE: XR CHEST 1 VW-  HISTORY: hypoxia; A41.9-Sepsis, unspecified organism; J18.9-Pneumonia, unspecified organism; N17.9-Acute kidney failure, unspecified  COMPARISON: None.  FINDINGS: The heart is normal in size. The mediastinum is unremarkable. There are low lung volumes with bibasilar atelectasis. The lungs are otherwise clear. There is no pneumothorax.  There are no acute osseous abnormalities.         Impression: Low lung volumes with bibasilar atelectasis.  Continued followup is recommended.  This report was finalized on 4/19/2017 8:02 AM by Anay Collazo M.D..    Ct Abdomen Pelvis Stone Protocol    Result Date: 4/13/2017  Narrative: EXAMINATION: CT ABDOMEN PELVIS STONE PROTOCOL- 04/13/2017  INDICATION: R31.9-Hematuria, unspecified     TECHNIQUE: CT scan of the abdomen and pelvis was performed without contrast.  The radiation dose reduction device was turned on for each scan per the ALARA (As Low as Reasonably Achievable) protocol.  COMPARISON: NONE  FINDINGS: The most superior images demonstrate no basilar pulmonary infiltrate or effusion. The liver and spleen are normal. There is no adrenal or pancreatic mass. There is no renal mass, stone or obstruction. There is no ascites, aneurysm or retroperitoneal lymphadenopathy. The appendix is surgically absent. There is no pelvic mass  or fluid. There is no inguinal lymphadenopathy. There is no evidence of bladder stone.      Impression: Negative CT scan of the abdomen and pelvis.   D:  04/13/2017 E:  04/13/2017   This report was finalized on 4/13/2017 3:16 PM by Dr. Isaias Meyer MD.              Assessment/Plan    #1 presumptive endocarditis: Patient's clinical picture is very suggestive endocarditis.  The echocardiogram is suggestive of possible vegetation attached the aortic valve and thickening of the mitral valve.  He is being treated as definitive endocarditis at this time this would be the best clinical course nevertheless suggest that he proceed with a FANG to document a baseline of his valves as well as  the pathology this would come in [place] if there is any recurrence or if there is any further progression of the disease that needs to be evaluated down the line.  Patient is in agreement with this plan I'll plan on the FANG first thing in the morning tomorrow.    #2 tachycardia: Patient has done better on low-dose beta blocker therapy would continue the same.    Principal Problem:    Severe sepsis due to methicillin resistant Staphylococcus aureus (MRSA) with acute organ dysfunction  Active Problems:    Gastroesophageal reflux disease    Anxiety    Primary osteoarthritis involving multiple joints    Essential hypertension    Headache    UTI (urinary tract infection), bacterial    Mild intermittent asthma without complication    Abdominal pain, lower    Shoulder pain    Acute right ankle pain    Acute gout of right ankle    Acute maxillary sinusitis    Acute kidney injury    Dehydration with hyponatremia    Elevated LFTs    Elevated INR            Patrice Parrish MD  04/20/17  8:22 PM

## 2017-04-21 NOTE — NURSING NOTE
VS between 0600 and 0700 during FANG reviewed with Raymond  Night shift.   Saved per Cecilia Land RN

## 2017-04-21 NOTE — PLAN OF CARE
Problem: Patient Care Overview (Adult)  Goal: Plan of Care Review  Outcome: Ongoing (interventions implemented as appropriate)    04/21/17 1623   Coping/Psychosocial Response Interventions   Plan Of Care Reviewed With patient   Patient Care Overview   Progress improving         Problem: NPPV/CPAP (Adult)  Goal: Signs and Symptoms of Listed Potential Problems Will be Absent or Manageable (NPPV/CPAP)  Outcome: Ongoing (interventions implemented as appropriate)    04/21/17 1623   NPPV/CPAP   Problems Assessed (NPPV/CPAP) skin breakdown;situational response   Problems Present (NPPV/CPAP) none

## 2017-04-21 NOTE — PLAN OF CARE
Problem: NPPV/CPAP (Adult)  Intervention: Monitor/Manage Anxiety Related to NPPV/CPAP    04/21/17 0012   Coping Strategies   Trust Relationship/Rapport care explained;empathic listening provided;choices provided       Intervention: Prevent Aspiration During Therapy    04/21/17 0012   Positioning   Head Of Bed (HOB) Position HOB elevated         Goal: Signs and Symptoms of Listed Potential Problems Will be Absent or Manageable (NPPV/CPAP)  Outcome: Ongoing (interventions implemented as appropriate)    04/21/17 0012   NPPV/CPAP   Problems Assessed (NPPV/CPAP) hypoxia/hypoxemia;situational response;dry mucous membranes;skin breakdown   Problems Present (NPPV/CPAP) none

## 2017-04-21 NOTE — NURSING NOTE
0900  Patient has 2cmx 1cm area on bottom of right big toe that is black in color but not a bruise area or soft.  Area is just dark in color.   This is caused by Emboli from endocarditis per Dr Coyne.

## 2017-04-21 NOTE — PLAN OF CARE
Problem: Patient Care Overview (Adult)  Goal: Plan of Care Review  Outcome: Ongoing (interventions implemented as appropriate)    04/20/17 3713   Coping/Psychosocial Response Interventions   Plan Of Care Reviewed With patient;spouse   Patient Care Overview   Progress improving       Goal: Adult Individualization and Mutuality  Outcome: Ongoing (interventions implemented as appropriate)    Problem: Pain, Acute (Adult)  Goal: Identify Related Risk Factors and Signs and Symptoms  Outcome: Ongoing (interventions implemented as appropriate)  Goal: Acceptable Pain Control/Comfort Level  Outcome: Ongoing (interventions implemented as appropriate)

## 2017-04-21 NOTE — PROGRESS NOTES
"Pharmacokinetic Follow-up Note - Onesimo Meyer is a 56 y.o. male  70\" (177.8 cm) 248 lb 4.8 oz (113 kg)    Indication for use: MRSA bacteremia      Results from last 7 days     Lab Units 04/21/17  0352 04/20/17  0701 04/19/17  0517   WBC 10*3/mm3 17.95* 16.89* 19.74*   CREATININE mg/dL 0.70 0.80 0.90      Estimated Creatinine Clearance: 151.9 mL/min (by C-G formula based on Cr of 0.7).  Temp Readings from Last 1 Encounters:   04/21/17 98.5 °F (36.9 °C) (Oral)     Lab Results   Component Value Date    VANCOTROUGH <5.00 (L) 04/21/2017         Current Vancomycin Dose:  2000 mg IVPB every 12 hours, day 3 of therapy.    Assessment/Plan:  Trough prior to the fourth dose on vancomycin 2000 mg (17.5 mg/kg) every 12 hours was substantially subtherapeutic at <  5 mcg/mL. 2 g dose given at ~0500, so will begin 1750 mg (15 mg/kg) IV q8h, with a target trough of 15-20.  Next trough will be scheduled for 11:30 tomorrow morning (4/22). Pharmacy will continue to monitor renal function and adjust dose accordingly.    Eladio Rodriguez, [facility]   04/21/17 7:40 AM    "

## 2017-04-21 NOTE — PLAN OF CARE
Problem: Patient Care Overview (Adult)  Goal: Plan of Care Review  Outcome: Ongoing (interventions implemented as appropriate)    04/21/17 3461   Coping/Psychosocial Response Interventions   Plan Of Care Reviewed With patient   Patient Care Overview   Progress improving   Outcome Evaluation   Outcome Summary/Follow up Plan Pt moved out of ICU today and in room 303. Pain is improving and pt is starting to have increased activity level.        Goal: Adult Individualization and Mutuality  Outcome: Ongoing (interventions implemented as appropriate)    Problem: Pain, Acute (Adult)  Goal: Identify Related Risk Factors and Signs and Symptoms  Outcome: Ongoing (interventions implemented as appropriate)  Goal: Acceptable Pain Control/Comfort Level  Outcome: Ongoing (interventions implemented as appropriate)

## 2017-04-21 NOTE — CONSULTS
INFECTIOUS DISEASE CONSULT/INITIAL HOSPITAL VISIT    Demi Meyer  1960  [id number]    Date of Consult: 4/21/2017    Admission Date: 4/18/2017      Requesting Provider: No ref. provider found  Evaluating Physician: Carlos Enrique Coyne MD    Reason for Consultation: Complicated MRSA bacteremia, MRSA endocarditis    History of present illness:    Patient is a 56 y.o. male who presented to ED with right shoulder pain and fever. Patient gives a h/o recent recurrent bronchitis in the past 3 months. He was also evaluated for right right cervical skin abscess in March and underwent I&D and was prescribed MRSA. On April 1 he saw his PCP for c/o hematuria, abdominal pain and diarrhea which have since resolved. CT abdomen/pelvis done at that time was negative. Urine culture grew MRSA, and he was prescribed Macrobid.  On April 16 he was seen by orthopedist for left shoulder pain. He underwent arthrocentesis in the office which was dry.   Patient was referred for admission for further evaluation. On admission labs were significant for leukocytosis with left shift (22K, N 85.8%), elevated lactic acid (2.1), BUN/creat 22/1.7. Blood cultures have been persistently positive for MRSA. CT chest/abdomen/pelvis have been unremarkable. ECHO was suggestive of vegetation attached to the aortic valve and thickening of the mitral valve. Patient was started initially on Vancomycin, but his troughs have been under therapeutic. Vancomycin dosing was increased to 1750 mg Q8H and Ceftaroline was added on. He is also on Cefepime, Levofloxacin and Rifampin.  Patient reports to feeling better. He has been afebrile. He c/o right ankle pain    Past Medical History:   Diagnosis Date   • Arthritis    • Asthma     Dr Butts   • Bronchiectasis    • Bronchitis, mucopurulent recurrent    • Diverticulitis    • Diverticulosis    • Gastric ulcer    • GERD (gastroesophageal reflux disease)    • Glaucoma    • Hypertension    • Lumbosacral disc disease    •  Neurologic disorder    • Renal calculi    • Scoliosis    • Stroke     TIA - slurred speech, discoordinated       Past Surgical History:   Procedure Laterality Date   • ABDOMINAL SURGERY     • APPENDECTOMY     • BACK SURGERY     • EYE SURGERY     • HERNIA REPAIR  2011    ventral & hiatal w/ mesh   • LUMBAR FUSION  1999    L3-S1   • TONSILLECTOMY         Family History   Problem Relation Age of Onset   • Hypertension Mother    • Cancer Mother      vulvar   • Diabetes Mother    • Diabetes Father    • Heart attack Father    • Irritable bowel syndrome Father    • Hypertension Father    • Irritable bowel syndrome Sister    • Cancer Brother    • Alcohol abuse Brother    • Prostate cancer Brother 61   • Stomach cancer Maternal Aunt      metastatic   • No Known Problems Maternal Uncle    • No Known Problems Paternal Aunt    • No Known Problems Maternal Grandmother    • Lung cancer Maternal Grandfather    • COPD Maternal Grandfather    • Alcohol abuse Maternal Grandfather    • Hypertension Maternal Grandfather    • Cancer Maternal Grandfather    • Esophageal cancer Paternal Grandmother    • Cancer Paternal Grandmother    • Asthma Sister    • No Known Problems Brother    • No Known Problems Maternal Uncle    • Gout Other    • Rheumatic fever Other    • Hyperlipidemia Other        Social History     Social History   • Marital status:      Spouse name: N/A   • Number of children: N/A   • Years of education: N/A     Occupational History   • Not on file.     Social History Main Topics   • Smoking status: Former Smoker     Packs/day: 0.25     Years: 20.00     Types: Cigarettes     Quit date: 8/18/2016   • Smokeless tobacco: Former User     Types: Chew   • Alcohol use 0.6 oz/week     1 Cans of beer per week      Comment: occassional   • Drug use: No   • Sexual activity: Yes     Partners: Female     Other Topics Concern   • Not on file     Social History Narrative    . Denies current tobacco or drug use. Drinks rare  occasional alcohol.       Allergies   Allergen Reactions   • Amoxicillin Rash   • Erythromycin Itching   • Celecoxib    • Citalopram    • Clonidine Derivatives    • Gabapentin    • Hydrocodone GI Intolerance         Medication:    Current Facility-Administered Medications:   •  acetaminophen (TYLENOL) tablet 650 mg, 650 mg, Oral, Q6H PRN, Janett Pabon DO, 650 mg at 04/20/17 1309  •  aspirin chewable tablet 324 mg, 324 mg, Oral, Daily, Camden Mcdonald MD, 324 mg at 04/21/17 0828  •  brimonidine-timolol (COMBIGAN) 0.2-0.5 % ophthalmic solution 1 drop, 1 drop, Both Eyes, Q12H, Umesh Cross MD, 1 drop at 04/21/17 0831  •  buPROPion SR (WELLBUTRIN SR) 12 hr tablet 150 mg, 150 mg, Oral, Q12H, Janett Pabon DO, 150 mg at 04/21/17 0827  •  ceftaroline (TEFLARO) 600 mg/100 mL 0.9% NS (mbp), 600 mg, Intravenous, Q12H, Camden Mcdonald MD, 600 mg at 04/21/17 0733  •  dextrose (D50W) solution 25 g, 25 g, Intravenous, Q15 Min PRN, Janett Pabon DO  •  dextrose (INSTA-GLUCOSE) 77.4 % gel 1 tube, 1 tube, Oral, Q15 Min PRN, Janett Pabon DO  •  diazePAM (VALIUM) tablet 5 mg, 5 mg, Oral, Q12H PRN, Janett Pabon DO, 5 mg at 04/18/17 2309  •  diazePAM (VALIUM) tablet 5 mg, 5 mg, Oral, Q12H, Camden Mcdonald MD  •  diphenhydrAMINE (BENADRYL) injection 12.5 mg, 12.5 mg, Intravenous, Q6H PRN, Camden Mcdonald MD, 12.5 mg at 04/21/17 0733  •  enoxaparin (LOVENOX) syringe 40 mg, 40 mg, Subcutaneous, Daily, Janett Pabon DO, 40 mg at 04/21/17 0828  •  famotidine (PEPCID) tablet 20 mg, 20 mg, Oral, BID, Janett Pabon DO, 20 mg at 04/21/17 0828  •  glucagon (human recombinant) (GLUCAGEN DIAGNOSTIC) injection 1 mg, 1 mg, Subcutaneous, Q15 Min PRN, Janett Pabon DO  •  HYDROmorphone (DILAUDID) injection 0.5 mg, 0.5 mg, Intravenous, Q4H PRN, Camden Mcdonald MD, 0.5 mg at 04/21/17 0829  •  insulin aspart (novoLOG) injection 0-7 Units, 0-7 Units, Subcutaneous, 4x Daily AC & at Bedtime, Janett SHER  DO Cm  •  ipratropium-albuterol (DUO-NEB) nebulizer solution 3 mL, 3 mL, Nebulization, Q6H PRN, Janett Pabon DO  •  LORazepam (ATIVAN) injection 0.5 mg, 0.5 mg, Intravenous, Q4H PRN, Camden Mcdonald MD  •  metoprolol tartrate (LOPRESSOR) tablet 12.5 mg, 12.5 mg, Oral, Q6H, Patrice Parrish MD, 12.5 mg at 04/21/17 0827  •  Morphine (MS CONTIN) 12 hr tablet 30 mg, 30 mg, Oral, Q12H, Camden Mcdonald MD, 30 mg at 04/21/17 0827  •  oxyCODONE-acetaminophen (PERCOCET) 5-325 MG per tablet 1 tablet, 1 tablet, Oral, Q6H PRN, Janett Pabon DO, 1 tablet at 04/20/17 0650  •  vancomycin 1750 mg in sodium chloride 0.9% 500 mL IVPB, 1,750 mg, Intravenous, Q8H **AND** Pharmacy to dose vancomycin, , Does not apply, Continuous PRN, Camden Mcdonald MD  •  phenazopyridine (PYRIDIUM) tablet 200 mg, 200 mg, Oral, TID PRN, Janett Pabon DO, 200 mg at 04/19/17 0925  •  predniSONE (DELTASONE) tablet 20 mg, 20 mg, Oral, Daily, Janett Pabon DO, 20 mg at 04/21/17 0827  •  promethazine (PHENERGAN) tablet 12.5 mg, 12.5 mg, Oral, Q6H PRN, Janett Pabon DO, 12.5 mg at 04/20/17 0412  •  rifAMPin (RIFADIN) capsule 300 mg, 300 mg, Oral, Q12H, Camden Mcdonald MD, 300 mg at 04/21/17 0827  •  sodium chloride 0.9 % flush 1-10 mL, 1-10 mL, Intravenous, PRN, Janett Pabon DO  •  sodium chloride 0.9 % flush 10 mL, 10 mL, Intravenous, PRN, Moises Meyer MD, 10 mL at 04/18/17 1933  •  sodium chloride 0.9 % infusion, 75 mL/hr, Intravenous, Continuous, Camden Mcdonald MD, Last Rate: 75 mL/hr at 04/21/17 0008, 75 mL/hr at 04/21/17 0008  •  tamsulosin (FLOMAX) 24 hr capsule 0.4 mg, 0.4 mg, Oral, Nightly, Janett Pabon DO, 0.4 mg at 04/20/17 2013    Antibiotics:  IV Anti-Infectives     Ordered     Dose/Rate Route Frequency Start Stop    04/21/17 0739  vancomycin 1750 mg in sodium chloride 0.9% 500 mL IVPB     Ordering Provider:  Camden Mcdonald MD    1,750 mg Intravenous Every 8 Hours  04/21/17 1200      04/21/17 0739  Pharmacy to dose vancomycin     Ordering Provider:  Camden Mcdonald MD     Does not apply Continuous PRN 04/21/17 0738      04/20/17 1830  rifAMPin (RIFADIN) capsule 300 mg     Ordering Provider:  Camden Mcdonald MD    300 mg Oral Every 12 Hours Scheduled 04/20/17 2100      04/20/17 1814  ceftaroline (TEFLARO) 600 mg/100 mL 0.9% NS (mbp)     Ordering Provider:  Camden Mcdonald MD    600 mg Intravenous Every 12 Hours 04/20/17 2000 04/18/17 2025  levoFLOXacin (LEVAQUIN) 500 mg/100 mL D5W (premix) (LEVAQUIN) 500 mg     Ordering Provider:  Moises Meyer MD    500 mg Intravenous Once 04/18/17 2027 04/18/17 2119 04/18/17 2025  cefepime (MAXIPIME) 2 g in sodium chloride 0.9 % 100 mL IVPB     Ordering Provider:  Moises Meyer MD    2 g Intravenous Once 04/18/17 2027 04/18/17 2118 04/18/17 1833  cefTRIAXone (ROCEPHIN) 1 g/50 mL 0.9% NS VTB (mbp)     Ordering Provider:  Moises Meyer MD    1 g  over 30 Minutes Intravenous Once 04/18/17 1835 04/18/17 1951 04/18/17 1833  vancomycin 1000 mg in sodium chloride 0.9% 250 mL IVPB     Ordering Provider:  Moises Meyer MD    1,000 mg Intravenous Once 04/18/17 1834 04/18/17 2025            Review of Systems:  Constitutional-- + fatigue  HEENT-- No new vision, hearing or throat complaints.  No epistaxis or oral sores.  Denies odynophagia or dysphagia. No headache, photophobia or neck stiffness.  CV-- No chest pain, palpitation or syncope  Resp-- No SOB/cough/Hemoptysis  GI- No nausea, vomiting, or diarrhea.  No hematochezia, melena, or hematemesis.   -- No dysuria.  Denies hesitancy, urgency or flank pain.  Lymph- no swollen lymph nodes in neck/axilla or groin.   Heme- No active bruising or bleeding; no Hx of DVT or PE.  MS-- right ankle pain, left shoulder pain  Neuro-- No acute focal weakness or numbness in the arms or legs.  No seizures.  Skin--+ new lesion on right great toe      Physical  Exam:   Vital Signs  Temp (24hrs), Av.2 °F (37.3 °C), Min:98.1 °F (36.7 °C), Max:101 °F (38.3 °C)    Temp  Min: 98.1 °F (36.7 °C)  Max: 101 °F (38.3 °C)  BP  Min: 85/71  Max: 124/73  Pulse  Min: 62  Max: 81  Resp  Min: 8  Max: 18  SpO2  Min: 87 %  Max: 95 %    GENERAL: Awake and alert, in no acute distress.   HEENT: Normocephalic, atraumatic.  PERRL. EOMI. No conjunctival injection. No icterus. Oropharynx clear without evidence of thrush or exudate.   NECK: Supple without nuchal rigidity. No mass.  LYMPH: No cervical, axillary or inguinal lymphadenopathy.  HEART: RRR; No murmur, rubs, gallops.   LUNGS: Clear to auscultation bilaterally without wheezing, rales, rhonchi. Normal respiratory effort. Nonlabored. No dullness.  ABDOMEN: Soft, nontender, nondistended. Positive bowel sounds. No rebound or guarding. No mass or HSM.  MSK: right ankle erythema, edema, tenderness  SKIN: erythema involving face and upper chest, Janeway lesion on right toe   NEURO: Oriented to PPT. No focal deficits on motor/sensory exam at arms/legs.  PSYCHIATRIC: Normal insight and judgement. Cooperative with PE    Laboratory Data      Results from last 7 days  Lab Units 17  0352 17  0701 17  0517   WBC 10*3/mm3 17.95* 16.89* 19.74*   HEMOGLOBIN g/dL 12.6* 13.3* 13.8*   HEMATOCRIT % 38.0* 39.4* 41.6*   PLATELETS 10*3/mm3 245 208 199       Results from last 7 days  Lab Units 17  0352   SODIUM mmol/L 136*   POTASSIUM mmol/L 4.0   CHLORIDE mmol/L 98   TOTAL CO2 mmol/L 27.0   BUN mg/dL 15   CREATININE mg/dL 0.70   GLUCOSE mg/dL 177*   CALCIUM mg/dL 7.7*       Results from last 7 days  Lab Units 17  0352   ALK PHOS U/L 118   BILIRUBIN mg/dL 1.8*   ALT (SGPT) U/L 101*   AST (SGOT) U/L 87*       Results from last 7 days  Lab Units 17  1622   SED RATE mm/hr 64*       Results from last 7 days  Lab Units 17  0517   CRP mg/dL 43.80*       Results from last 7 days  Lab Units 17  0517   LACTATE mmol/L 1.5        Results from last 7 days  Lab Units 04/18/17  1837   CK TOTAL U/L 72       Results from last 7 days  Lab Units 04/21/17  0352   VANCOMYCIN TR mcg/mL <5.00*     Estimated Creatinine Clearance: 151.9 mL/min (by C-G formula based on Cr of 0.7).      Microbiology:  Blood Culture   Date Value Ref Range Status   04/19/2017 Abnormal Stain (A)  Preliminary   04/19/2017 Staphylococcus aureus, MRSA (C)  Preliminary     Comment:     ARMANDO to follow.    Consider infectious disease consult to rule out distant focus of infection.  Methicillin resistant Staphylococcus aureus, Patient may be an isolation risk.              MRSA SCREEN CX   Date Value Ref Range Status   04/18/2017   Final    No Methicillin Resistant Staphylococcus aureus isolated                    Radiology:  Imaging Results (last 72 hours)     Procedure Component Value Units Date/Time    CT Cervical Spine Without Contrast [[id number]] Collected:  04/18/17 2046     Updated:  04/18/17 2049    Narrative:       FINAL REPORT    TECHNIQUE:  Thin section axial images were obtained through the cervical spine without contrast.  Coronal and sagittal reconstructed images were then provided.    CLINICAL HISTORY:  NECK PAIN, NO INJURY    FINDINGS:  There is normal alignment and curvature. There is no fracture. There are moderate changes of degenerative disc disease from C4-C7, but no significant posterior osteophytosis. There is no significant neuroforaminal narrowing at any level.      Impression:       Moderate degenerative changes in the lower cervical spine, otherwise no significant abnormality.    Authenticated by Jonnie Aleman M.D. on 04/18/2017 08:46:37 PM    CT Head Without Contrast [[id number]] Collected:  04/18/17 2047     Updated:  04/18/17 2049    Narrative:       FINAL REPORT    TECHNIQUE:  Routine axial images through the head were obtained without contrast.    CLINICAL HISTORY:  PAIN, NO INJURY    FINDINGS:  The ventricles are normal. There is no mass or other  abnormal hypodensity. There is no shift of midline structures. There is no intracranial hemorrhage. There are postoperative changes in the left orbit and an air-fluid level in the right maxillary sinus   that may be due to acute on chronic sinusitis. The sinuses are otherwise clear and no acute osseous abnormality is seen.      Impression:       Possible acute on chronic sinusitis. No intracranial abnormality.    Authenticated by Jonnie Aleman M.D. on 04/18/2017 08:47:48 PM    CT Chest Without Contrast [[id number]] Collected:  04/18/17 2048     Updated:  04/18/17 2050    Narrative:       FINAL REPORT    TECHNIQUE:  Routine axial images were obtained from the lung apices to below the diaphragm without contrast.    CLINICAL HISTORY:  BILATERAL SHOULDER PAIN, CHEST PAIN, NO INJURY    FINDINGS:  Other than slight bibasilar atelectasis, the lungs are clear. The heart and vasculature are unremarkable. There is no pleural disease, adenopathy, or significant osseous abnormality.      Impression:       No significant abnormality.    Authenticated by Jonnie Aleman M.D. on 04/18/2017 08:48:33 PM    CT Abdomen Pelvis Without Contrast [[id number]] Collected:  04/18/17 2119     Updated:  04/18/17 2121    Narrative:       FINAL REPORT    TECHNIQUE:  Axial images through the abdomen and pelvis were obtained by computed tomography.  IV contrast was withheld.    CLINICAL HISTORY:  RLQ PAIN    FINDINGS:  Abdomen:  The gallbladder, solid abdominal organs and ureters are normal. The GI tract is unremarkable. The appendix appears to have been removed. There is no evidence of appendicitis in any case.    Pelvis: The urinary bladder is unremarkable. There are postoperative and degenerative changes in the lumbar spine. There is no pelvic or abdominal ascites, adenopathy or acute osseous abnormality.      Impression:       No acute disease.    Authenticated by Jonnie Aleman M.D. on 04/18/2017 09:19:16 PM    XR Chest 1 View [[id number]] Collected:  04/19/17  0801     Updated:  04/19/17 0804    Narrative:       PROCEDURE: XR CHEST 1 VW-     HISTORY: hypoxia; A41.9-Sepsis, unspecified organism; J18.9-Pneumonia,  unspecified organism; N17.9-Acute kidney failure, unspecified     COMPARISON: None.     FINDINGS: The heart is normal in size. The mediastinum is unremarkable.  There are low lung volumes with bibasilar atelectasis. The lungs are  otherwise clear. There is no pneumothorax.  There are no acute osseous  abnormalities.           Impression:       Low lung volumes with bibasilar atelectasis.     Continued followup is recommended.     This report was finalized on 4/19/2017 8:02 AM by Anay Collazo M.D..            Impression:   MRSA endocarditis  Complicated MRSA bacteremia  Right ankle septic arthritis  Urinary Tract Infection (MRSA)    PLAN/RECOMMENDATIONS:   57 yo M with h/o HTN, CVA, GERD, Diverticulosis admitted with Complicated MRSA bacteremia/MRSA endocarditis. ECHO was suggestive of possible vegetation attached to the aortic valve and thickening of the mitral valve. Patient underwent FANG today.  Blood cultures continue to be persistently positive for MRSA.    Sugg:  - Agree with increasing Vancomycin dosing to achieve troughs 15-20  - Agree with continuation of Ceftaroline at this time  - Would dicontinue Levofloxacin and Cefepime (cultures show no growth of gram negative bacteria)  - XRay of right ankle to evaluate for septic arthritis  - Daily blood cultures until negative  - Prognosis remain guarded  - Will f/u!            Carlos Enrique Coyne MD  4/21/2017  10:14 AM

## 2017-04-22 LAB
ALBUMIN SERPL-MCNC: 2.9 G/DL (ref 3.5–5)
ALBUMIN/GLOB SERPL: 0.9 G/DL (ref 1–2)
ALP SERPL-CCNC: 122 U/L (ref 38–126)
ALT SERPL W P-5'-P-CCNC: 81 U/L (ref 13–69)
ANION GAP SERPL CALCULATED.3IONS-SCNC: 16.5 MMOL/L
ANISOCYTOSIS BLD QL: ABNORMAL
AST SERPL-CCNC: 49 U/L (ref 15–46)
BACTERIA SPEC AEROBE CULT: NORMAL
BILIRUB SERPL-MCNC: 1.1 MG/DL (ref 0.2–1.3)
BUN BLD-MCNC: 15 MG/DL (ref 7–20)
BUN/CREAT SERPL: 21.4 (ref 6.3–21.9)
CALCIUM SPEC-SCNC: 8.2 MG/DL (ref 8.4–10.2)
CHLORIDE SERPL-SCNC: 100 MMOL/L (ref 98–107)
CO2 SERPL-SCNC: 28 MMOL/L (ref 26–30)
CREAT BLD-MCNC: 0.7 MG/DL (ref 0.6–1.3)
CRP SERPL-MCNC: 21.6 MG/DL (ref 0–1)
DEPRECATED RDW RBC AUTO: 48.3 FL (ref 37–54)
ERYTHROCYTE [DISTWIDTH] IN BLOOD BY AUTOMATED COUNT: 14 % (ref 11.5–14.5)
GFR SERPL CREATININE-BSD FRML MDRD: 118 ML/MIN/1.73
GLOBULIN UR ELPH-MCNC: 3.3 GM/DL
GLUCOSE BLD-MCNC: 104 MG/DL (ref 74–98)
GLUCOSE BLDC GLUCOMTR-MCNC: 100 MG/DL (ref 70–130)
GLUCOSE BLDC GLUCOMTR-MCNC: 109 MG/DL (ref 70–130)
GLUCOSE BLDC GLUCOMTR-MCNC: 112 MG/DL (ref 70–130)
GLUCOSE BLDC GLUCOMTR-MCNC: 99 MG/DL (ref 70–130)
HCT VFR BLD AUTO: 41.9 % (ref 42–52)
HGB BLD-MCNC: 13.6 G/DL (ref 14–18)
LYMPHOCYTES # BLD MANUAL: 1.15 10*3/MM3 (ref 0.6–3.4)
LYMPHOCYTES NFR BLD MANUAL: 3 % (ref 0–12)
LYMPHOCYTES NFR BLD MANUAL: 8 % (ref 10–50)
MCH RBC QN AUTO: 30.1 PG (ref 27–31)
MCHC RBC AUTO-ENTMCNC: 32.5 G/DL (ref 30–37)
MCV RBC AUTO: 92.7 FL (ref 80–94)
MONOCYTES # BLD AUTO: 0.43 10*3/MM3 (ref 0–0.9)
NEUTROPHILS # BLD AUTO: 12.79 10*3/MM3 (ref 2–6.9)
NEUTROPHILS NFR BLD MANUAL: 81 % (ref 37–80)
NEUTS BAND NFR BLD MANUAL: 8 % (ref 0–6)
PLATELET # BLD AUTO: 304 10*3/MM3 (ref 130–400)
PMV BLD AUTO: 11 FL (ref 6–12)
POTASSIUM BLD-SCNC: 3.5 MMOL/L (ref 3.5–5.1)
PROT SERPL-MCNC: 6.2 G/DL (ref 6.3–8.2)
RBC # BLD AUTO: 4.52 10*6/MM3 (ref 4.7–6.1)
SMALL PLATELETS BLD QL SMEAR: ADEQUATE
SODIUM BLD-SCNC: 141 MMOL/L (ref 137–145)
WBC MORPH BLD: NORMAL
WBC NRBC COR # BLD: 14.37 10*3/MM3 (ref 4.8–10.8)

## 2017-04-22 PROCEDURE — 86140 C-REACTIVE PROTEIN: CPT | Performed by: HOSPITALIST

## 2017-04-22 PROCEDURE — 63710000001 PROMETHAZINE PER 12.5 MG: Performed by: FAMILY MEDICINE

## 2017-04-22 PROCEDURE — 25010000002 ENOXAPARIN PER 10 MG: Performed by: FAMILY MEDICINE

## 2017-04-22 PROCEDURE — 99231 SBSQ HOSP IP/OBS SF/LOW 25: CPT | Performed by: HOSPITALIST

## 2017-04-22 PROCEDURE — 87186 SC STD MICRODIL/AGAR DIL: CPT | Performed by: HOSPITALIST

## 2017-04-22 PROCEDURE — 80053 COMPREHEN METABOLIC PANEL: CPT | Performed by: HOSPITALIST

## 2017-04-22 PROCEDURE — 25010000002 VANCOMYCIN PER 500 MG: Performed by: HOSPITALIST

## 2017-04-22 PROCEDURE — 82962 GLUCOSE BLOOD TEST: CPT

## 2017-04-22 PROCEDURE — 85007 BL SMEAR W/DIFF WBC COUNT: CPT | Performed by: HOSPITALIST

## 2017-04-22 PROCEDURE — 85027 COMPLETE CBC AUTOMATED: CPT | Performed by: HOSPITALIST

## 2017-04-22 PROCEDURE — 87147 CULTURE TYPE IMMUNOLOGIC: CPT | Performed by: HOSPITALIST

## 2017-04-22 PROCEDURE — 87040 BLOOD CULTURE FOR BACTERIA: CPT | Performed by: HOSPITALIST

## 2017-04-22 PROCEDURE — 87077 CULTURE AEROBIC IDENTIFY: CPT | Performed by: HOSPITALIST

## 2017-04-22 PROCEDURE — 25010000002 HYDROMORPHONE PER 4 MG: Performed by: HOSPITALIST

## 2017-04-22 PROCEDURE — 87205 SMEAR GRAM STAIN: CPT | Performed by: HOSPITALIST

## 2017-04-22 PROCEDURE — 63710000001 PREDNISONE PER 1 MG: Performed by: FAMILY MEDICINE

## 2017-04-22 RX ORDER — ONDANSETRON 2 MG/ML
4 INJECTION INTRAMUSCULAR; INTRAVENOUS EVERY 6 HOURS PRN
Status: DISCONTINUED | OUTPATIENT
Start: 2017-04-22 | End: 2017-04-26 | Stop reason: HOSPADM

## 2017-04-22 RX ADMIN — BRIMONIDINE TARTRATE, TIMOLOL MALEATE 1 DROP: 2; 5 SOLUTION/ DROPS OPHTHALMIC at 20:53

## 2017-04-22 RX ADMIN — BRIMONIDINE TARTRATE, TIMOLOL MALEATE 1 DROP: 2; 5 SOLUTION/ DROPS OPHTHALMIC at 08:34

## 2017-04-22 RX ADMIN — CEFTAROLINE FOSAMIL 600 MG: 600 POWDER, FOR SOLUTION INTRAVENOUS at 19:38

## 2017-04-22 RX ADMIN — DIAZEPAM 5 MG: 5 TABLET ORAL at 19:39

## 2017-04-22 RX ADMIN — VANCOMYCIN HYDROCHLORIDE 1750 MG: 500 INJECTION, POWDER, LYOPHILIZED, FOR SOLUTION INTRAVENOUS at 19:38

## 2017-04-22 RX ADMIN — METOPROLOL SUCCINATE 50 MG: 50 TABLET, EXTENDED RELEASE ORAL at 08:33

## 2017-04-22 RX ADMIN — ENOXAPARIN SODIUM 40 MG: 40 INJECTION SUBCUTANEOUS at 08:31

## 2017-04-22 RX ADMIN — BUPROPION HYDROCHLORIDE 150 MG: 150 TABLET, FILM COATED, EXTENDED RELEASE ORAL at 08:32

## 2017-04-22 RX ADMIN — FAMOTIDINE 20 MG: 20 TABLET, FILM COATED ORAL at 08:32

## 2017-04-22 RX ADMIN — RIFAMPIN 300 MG: 300 CAPSULE ORAL at 20:52

## 2017-04-22 RX ADMIN — FAMOTIDINE 20 MG: 20 TABLET, FILM COATED ORAL at 17:17

## 2017-04-22 RX ADMIN — OXYCODONE HYDROCHLORIDE AND ACETAMINOPHEN 1 TABLET: 5; 325 TABLET ORAL at 01:16

## 2017-04-22 RX ADMIN — TAMSULOSIN HYDROCHLORIDE 0.4 MG: 0.4 CAPSULE ORAL at 20:52

## 2017-04-22 RX ADMIN — PREDNISONE 20 MG: 20 TABLET ORAL at 08:33

## 2017-04-22 RX ADMIN — RIFAMPIN 300 MG: 300 CAPSULE ORAL at 08:32

## 2017-04-22 RX ADMIN — ASPIRIN 81 MG 324 MG: 81 TABLET ORAL at 08:32

## 2017-04-22 RX ADMIN — DIAZEPAM 5 MG: 5 TABLET ORAL at 08:32

## 2017-04-22 RX ADMIN — VANCOMYCIN HYDROCHLORIDE 1750 MG: 500 INJECTION, POWDER, LYOPHILIZED, FOR SOLUTION INTRAVENOUS at 12:54

## 2017-04-22 RX ADMIN — VANCOMYCIN HYDROCHLORIDE 1750 MG: 500 INJECTION, POWDER, LYOPHILIZED, FOR SOLUTION INTRAVENOUS at 03:40

## 2017-04-22 RX ADMIN — MORPHINE SULFATE 30 MG: 30 TABLET, EXTENDED RELEASE ORAL at 20:52

## 2017-04-22 RX ADMIN — CEFTAROLINE FOSAMIL 600 MG: 600 POWDER, FOR SOLUTION INTRAVENOUS at 08:29

## 2017-04-22 RX ADMIN — BUPROPION HYDROCHLORIDE 150 MG: 150 TABLET, FILM COATED, EXTENDED RELEASE ORAL at 20:52

## 2017-04-22 RX ADMIN — HYDROMORPHONE HYDROCHLORIDE 0.5 MG: 1 INJECTION, SOLUTION INTRAMUSCULAR; INTRAVENOUS; SUBCUTANEOUS at 02:21

## 2017-04-22 RX ADMIN — MORPHINE SULFATE 30 MG: 30 TABLET, EXTENDED RELEASE ORAL at 08:32

## 2017-04-22 RX ADMIN — PROMETHAZINE HYDROCHLORIDE 12.5 MG: 12.5 TABLET ORAL at 08:27

## 2017-04-22 NOTE — PLAN OF CARE
Problem: Patient Care Overview (Adult)  Goal: Plan of Care Review  Outcome: Ongoing (interventions implemented as appropriate)    04/22/17 0427   Coping/Psychosocial Response Interventions   Plan Of Care Reviewed With patient   Patient Care Overview   Progress improving       Goal: Adult Individualization and Mutuality  Outcome: Ongoing (interventions implemented as appropriate)  Goal: Discharge Needs Assessment  Outcome: Ongoing (interventions implemented as appropriate)    Problem: Pain, Acute (Adult)  Goal: Identify Related Risk Factors and Signs and Symptoms  Outcome: Ongoing (interventions implemented as appropriate)  Goal: Acceptable Pain Control/Comfort Level  Outcome: Ongoing (interventions implemented as appropriate)    04/22/17 0427   Pain, Acute (Adult)   Acceptable Pain Control/Comfort Level making progress toward outcome

## 2017-04-22 NOTE — PLAN OF CARE
Problem: Patient Care Overview (Adult)  Goal: Plan of Care Review  Outcome: Ongoing (interventions implemented as appropriate)    04/22/17 1603   Coping/Psychosocial Response Interventions   Plan Of Care Reviewed With patient   Patient Care Overview   Progress improving   Outcome Evaluation   Outcome Summary/Follow up Plan decreased pain medication demand and patient has rested well all day. Continue antibitoic use.        Goal: Adult Individualization and Mutuality  Outcome: Ongoing (interventions implemented as appropriate)    Problem: Pain, Acute (Adult)  Goal: Identify Related Risk Factors and Signs and Symptoms  Outcome: Ongoing (interventions implemented as appropriate)  Goal: Acceptable Pain Control/Comfort Level  Outcome: Ongoing (interventions implemented as appropriate)

## 2017-04-22 NOTE — PROGRESS NOTES
"   LOS: 3 days   Patient Care Team:  Angelita Lu MD as PCP - General      Interval History: Is been feeling better.  He Less Toxic.  Hasn't Had His FANG This Morning.     Review of Systems:   Pertinent items are noted in HPI.      Objective     Vital Sign Min/Max for last 24 hours  Temp  Min: 97.2 °F (36.2 °C)  Max: 99.7 °F (37.6 °C)   BP  Min: 85/71  Max: 124/73   Pulse  Min: 62  Max: 77   Resp  Min: 8  Max: 20   SpO2  Min: 88 %  Max: 96 %   Flow (L/min)  Min: 5  Max: 12   Weight  Min: 248 lb 4.8 oz (113 kg)  Max: 248 lb 4.8 oz (113 kg)     Flowsheet Rows         First Filed Value    Admission Height  70\" (177.8 cm) Documented at 04/18/2017 1740    Admission Weight  230 lb (104 kg) Documented at 04/18/2017 1740          Physical Exam:     General Appearance:    Alert, cooperative, in no acute distress   Head:    Normocephalic, without obvious abnormality, atraumatic   Eyes:            Lids and lashes normal, conjunctivae and sclerae normal, no   icterus, no pallor, corneas clear, PERRLA   Ears:    Ears appear intact with no abnormalities noted   Throat:   No oral lesions, no thrush, oral mucosa moist   Neck:   No adenopathy, supple, trachea midline, no thyromegaly, no   carotid bruit, no JVD   Back:     No kyphosis present, no scoliosis present, no skin lesions,      erythema or scars, no tenderness to percussion or                   palpation,   range of motion normal   Lungs:     Clear to auscultation,respirations regular, even and                  unlabored    Heart:    Regular rhythm and normal rate, normal S1 and S2, no            murmur, no gallop, no rub, no click   Chest Wall:    No abnormalities observed   Abdomen:     Normal bowel sounds, no masses, no organomegaly, soft        non-tender, non-distended, no guarding, no rebound                tenderness   Rectal:     Deferred   Extremities:   Moves all extremities well, no edema, no cyanosis, no             redness   Pulses:   Pulses palpable and equal " bilaterally   Skin: Peripheral stigmata of endocarditis looks significantly better.     Lymph nodes:   No palpable adenopathy   Neurologic:   Cranial nerves 2 - 12 grossly intact, sensation intact, DTR       present and equal bilaterally        Results Review:     I reviewed the patient's new clinical results.    Results Review:   I reviewed the patient's new clinical results.    Telemetry: Sinus rhythm with controlled ventricular rate.    LAB DATA :           Laboratory results:      Results from last 7 days  Lab Units 04/21/17  0352 04/20/17  0701 04/19/17  0517 04/18/17 1837 04/18/17  1622 04/16/17  0154   SODIUM mmol/L 136* 134* 136* 133* 136* 133   POTASSIUM mmol/L 4.0 3.5 3.7 4.4 3.9 4.1   CHLORIDE mmol/L 98 97* 96* 90* 90* 96*   TOTAL CO2 mmol/L 27.0 27.0 27.0 27.0 33.0* 29.0   BUN mg/dL 15 13 15 22* 18 10   CREATININE mg/dL 0.70 0.80 0.90 1.70* 1.70* 0.80   CALCIUM mg/dL 7.7* 7.4* 7.6* 8.9 9.0 9.4   BILIRUBIN mg/dL 1.8* 1.0 0.7 1.2  --  0.4   ALK PHOS U/L 118 104 97 128*  --  96   ALT (SGPT) U/L 101* 104* 78* 66  --  85*   AST (SGOT) U/L 87* 107* 56* 39  --  63*   GLUCOSE mg/dL 177* 132* 213* 150* 160* 160*       Results from last 7 days  Lab Units 04/21/17  0352 04/20/17  0701 04/19/17  0517 04/18/17 1837 04/18/17  1622 04/16/17  0154   WBC 10*3/mm3 17.95* 16.89* 19.74* 22.81* 22.07* 13.01*   HEMOGLOBIN g/dL 12.6* 13.3* 13.8* 16.6 15.9 16.5   HEMATOCRIT % 38.0* 39.4* 41.6* 49.3 48.4 49.8   PLATELETS 10*3/mm3 245 208 199 216 222 197       Results from last 7 days  Lab Units 04/20/17  0717 04/19/17  0517 04/18/17  1837   INR  1.69* 1.62* 1.49*       Results from last 7 days  Lab Units 04/21/17  0359 04/21/17  0352 04/19/17  0517 04/19/17  0510 04/18/17  2201 04/18/17  1900 04/18/17  1850   BLOODCX  No growth at less than 24 hours No growth at less than 24 hours Abnormal Stain*  Staphylococcus aureus, MRSA* Abnormal Stain*  Staphylococcus aureus, MRSA*  --  Abnormal Stain*  Staphylococcus aureus, MRSA*  Abnormal Stain*  Staphylococcus aureus, MRSA*   MRSA SCREEN CX   --   --   --   --  No Methicillin Resistant Staphylococcus aureus isolated  --   --            Results from last 7 days  Lab Units 04/19/17  1747  04/18/17  1837   CK TOTAL U/L  --   --  72   TROPONIN I ng/mL 0.483*  < > <0.012   < > = values in this interval not displayed.            Results from last 7 days  Lab Units 04/18/17 2008   PH, ARTERIAL pH units 7.541   PCO2, ARTERIAL mm Hg 32.8*   PO2 ART mm Hg 46.5*   HCO3 ART mmol/L 28.1*   BASE EXCESS ART mmol/L 5.6   HEMOGLOBIN, ARTERIAL g/dL 15.5   OXYHEMOGLOBIN % 85.8*   METHEMOGLOBIN % 0.4   CARBOXYHEMOGLOBIN % 1.7   MODALITY  Room air   FIO2 % 21     Lab Results   Component Value Date    HGBA1C 6.6 (H) 04/18/2017       Results from last 7 days  Lab Units 04/18/17  2259   TSH mIU/mL 1.180       Results from last 7 days  Lab Units 04/16/17  0154   LIPASE U/L 51       IMAGING DATA:     Ct Abdomen Pelvis Without Contrast    Result Date: 4/18/2017  Narrative: FINAL REPORT TECHNIQUE: Axial images through the abdomen and pelvis were obtained by computed tomography.  IV contrast was withheld. CLINICAL HISTORY: RLQ PAIN FINDINGS: Abdomen:  The gallbladder, solid abdominal organs and ureters are normal. The GI tract is unremarkable. The appendix appears to have been removed. There is no evidence of appendicitis in any case. Pelvis: The urinary bladder is unremarkable. There are postoperative and degenerative changes in the lumbar spine. There is no pelvic or abdominal ascites, adenopathy or acute osseous abnormality.     Impression: No acute disease. Authenticated by Jonnie Aleman M.D. on 04/18/2017 09:19:16 PM    Xr Chest 2 View    Result Date: 4/17/2017  Narrative:  EXAMINATION: XR CHEST 2 VW - 04/16/2017  INDICATION: Chest pain.  COMPARISON: 09/28/2016  FINDINGS: Two-views chest reveal the cardiomediastinal silhouettes to be within normal limits. The lung fields are grossly clear. No pleural effusion or  pneumothorax. Minimal degenerative change is seen within the spine. Pulmonary vascularity is within normal limits.      Impression: Stable chest. No acute cardiopulmonary disease.  FAX TO: A  DICTATED:     04/16/2017 EDITED:         04/16/2017  This report was finalized on 4/17/2017 11:10 AM by Dr. Josephine Cash MD.      Xr Shoulder 2+ View Left    Result Date: 4/17/2017  Narrative:  EXAMINATION: XR SHOULDER 2+ VW LEFT - 04/16/2017  INDICATION: Pain.  COMPARISON: None.  FINDINGS: Two-views left shoulder reveal no fracture or dislocation. Cortex is intact. Joint spaces are preserved. No soft tissue abnormality identified. No fracture or dislocation.         Impression: No acute bony abnormality identified.  FAX TO: A  DICTATED:     04/16/2017 EDITED:         04/16/2017  This report was finalized on 4/17/2017 11:10 AM by Dr. Josephine Cash MD.      Ct Head Without Contrast    Result Date: 4/18/2017  Narrative: FINAL REPORT TECHNIQUE: Routine axial images through the head were obtained without contrast. CLINICAL HISTORY: PAIN, NO INJURY FINDINGS: The ventricles are normal. There is no mass or other abnormal hypodensity. There is no shift of midline structures. There is no intracranial hemorrhage. There are postoperative changes in the left orbit and an air-fluid level in the right maxillary sinus  that may be due to acute on chronic sinusitis. The sinuses are otherwise clear and no acute osseous abnormality is seen.     Impression: Possible acute on chronic sinusitis. No intracranial abnormality. Authenticated by Jonnie Aleman M.D. on 04/18/2017 08:47:48 PM    Ct Chest Without Contrast    Result Date: 4/18/2017  Narrative: FINAL REPORT TECHNIQUE: Routine axial images were obtained from the lung apices to below the diaphragm without contrast. CLINICAL HISTORY: BILATERAL SHOULDER PAIN, CHEST PAIN, NO INJURY FINDINGS: Other than slight bibasilar atelectasis, the lungs are clear. The heart and vasculature are  unremarkable. There is no pleural disease, adenopathy, or significant osseous abnormality.     Impression: No significant abnormality. Authenticated by Jonnie Aleman M.D. on 04/18/2017 08:48:33 PM    Ct Cervical Spine Without Contrast    Result Date: 4/18/2017  Narrative: FINAL REPORT TECHNIQUE: Thin section axial images were obtained through the cervical spine without contrast.  Coronal and sagittal reconstructed images were then provided. CLINICAL HISTORY: NECK PAIN, NO INJURY FINDINGS: There is normal alignment and curvature. There is no fracture. There are moderate changes of degenerative disc disease from C4-C7, but no significant posterior osteophytosis. There is no significant neuroforaminal narrowing at any level.     Impression: Moderate degenerative changes in the lower cervical spine, otherwise no significant abnormality. Authenticated by Jonnie Aleman M.D. on 04/18/2017 08:46:37 PM    Xr Chest 1 View    Result Date: 4/21/2017  Narrative: PROCEDURE: XR CHEST 1 VW-  HISTORY: hypoxemia; A41.9-Sepsis, unspecified organism; J18.9-Pneumonia, unspecified organism; N17.9-Acute kidney failure, unspecified  COMPARISON: April 19, 2017.  FINDINGS: The heart is normal in size. The mediastinum is unremarkable. There are low lung volumes with bibasilar atelectasis which is essentially unchanged. There is no pneumothorax.  There are no acute osseous abnormalities.         Impression: Low lung volumes with bibasilar atelectasis which is essentially unchanged.  Continued followup is recommended.  This report was finalized on 4/21/2017 11:39 AM by Anay Collazo M.D..    Xr Chest 1 View    Result Date: 4/19/2017  Narrative: PROCEDURE: XR CHEST 1 VW-  HISTORY: hypoxia; A41.9-Sepsis, unspecified organism; J18.9-Pneumonia, unspecified organism; N17.9-Acute kidney failure, unspecified  COMPARISON: None.  FINDINGS: The heart is normal in size. The mediastinum is unremarkable. There are low lung volumes with bibasilar atelectasis.  The lungs are otherwise clear. There is no pneumothorax.  There are no acute osseous abnormalities.         Impression: Low lung volumes with bibasilar atelectasis.  Continued followup is recommended.  This report was finalized on 4/19/2017 8:02 AM by Anay Collazo M.D..    Ct Abdomen Pelvis Stone Protocol    Result Date: 4/13/2017  Narrative: EXAMINATION: CT ABDOMEN PELVIS STONE PROTOCOL- 04/13/2017  INDICATION: R31.9-Hematuria, unspecified     TECHNIQUE: CT scan of the abdomen and pelvis was performed without contrast.  The radiation dose reduction device was turned on for each scan per the ALARA (As Low as Reasonably Achievable) protocol.  COMPARISON: NONE  FINDINGS: The most superior images demonstrate no basilar pulmonary infiltrate or effusion. The liver and spleen are normal. There is no adrenal or pancreatic mass. There is no renal mass, stone or obstruction. There is no ascites, aneurysm or retroperitoneal lymphadenopathy. The appendix is surgically absent. There is no pelvic mass or fluid. There is no inguinal lymphadenopathy. There is no evidence of bladder stone.      Impression: Negative CT scan of the abdomen and pelvis.   D:  04/13/2017 E:  04/13/2017   This report was finalized on 4/13/2017 3:16 PM by Dr. Isaias Meyer MD.              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  Being planned on 6-8 weeks antibiotic therapy.  Would reevaluate his cardiac condition after the antibiotic therapy and bacteremia is resolved.  Appreciate input by infectious diseases team.    #2 tachycardia.  Looks a lot better on the low-dose beta blocker therapy would continue the same.  Would avoid aggressive hydration.    #3 valvular heart disease and has significant mitral insufficiency which is a cause for concern.  On questioning he admits that he has been short of breath for quite some time now.  Whether this is  baseline mitral insufficiency which got infected versus infection making the mitral insufficiency was is not very clear.  By history there appears to be the former.  Nevertheless now would not be a good time for any definitive therapy would continue antibiotic therapy and reevaluate in due course of time to see if definitive therapy is needed.    #4 sepsis.  On antibiotic therapy and seems to be responding well.        Principal Problem:    Severe sepsis due to methicillin resistant Staphylococcus aureus (MRSA) with acute organ dysfunction  Active Problems:    Gastroesophageal reflux disease    Anxiety    Primary osteoarthritis involving multiple joints    Essential hypertension    Headache    UTI (urinary tract infection), bacterial    Mild intermittent asthma without complication    Abdominal pain, lower    Shoulder pain    Acute right ankle pain    Acute gout of right ankle    Acute maxillary sinusitis    Acute kidney injury    Dehydration with hyponatremia    Elevated LFTs    Elevated INR            Patrice Parrish MD  04/21/17  8:10 PM

## 2017-04-22 NOTE — PROGRESS NOTES
INPATIENT PROGRESS NOTE    Date of Admission: 4/18/2017  Length of Stay: 3  Primary Care Physician: Angelita Lu MD    Subjective   HPI:  Mr. Meyer is a 56 year old male who presents to the [facility] ED with complaints of diffuse joint pain, significantly worse in his left shoulder, fevers, and MRSA UTI.  He met sepsis criteria in the ED and has been admitted for further treatment.      Interval History:  04/21/17 - Patient seen and examined.  Continues to have fevers.  TTE with probable vegetation on aortic valve, FANG just done.  Denies any new concerns, no N/V/D.  Appetite fair. No dyspnea, chest pain. + arthralgias.    Steady clinical improvement.  Discussed with Dr. Coyne      Objective      Temp:  [97.2 °F (36.2 °C)-99.7 °F (37.6 °C)] 97.8 °F (36.6 °C)  Heart Rate:  [62-71] 64  Resp:  [8-20] 20  BP: ()/(57-94) 111/78    Gen: Alert, appropriate, pleasant and interactive  HEENT: EOMI, ATNC, MMM  Neck: Supple  Heart: S1S2, RRR  Lungs: CTA bilaterally, no wheezes, rales, or rhonchi  Abdomen: Soft, NTND, BS+  Extremities: Warm, well-perfused, + pulses  Skin: P/W/D  Neuro: A/O x3, speech clear    Results Review:    I have reviewed the labs, radiology results and diagnostic studies.      Results from last 7 days  Lab Units 04/21/17  0352 04/20/17  0701 04/19/17  0517   SODIUM mmol/L 136* 134* 136*   POTASSIUM mmol/L 4.0 3.5 3.7   CHLORIDE mmol/L 98 97* 96*   TOTAL CO2 mmol/L 27.0 27.0 27.0   BUN mg/dL 15 13 15   CREATININE mg/dL 0.70 0.80 0.90   CALCIUM mg/dL 7.7* 7.4* 7.6*   BILIRUBIN mg/dL 1.8* 1.0 0.7   ALK PHOS U/L 118 104 97   ALT (SGPT) U/L 101* 104* 78*   AST (SGOT) U/L 87* 107* 56*   GLUCOSE mg/dL 177* 132* 213*         Results from last 7 days  Lab Units 04/21/17  0352 04/20/17  0701 04/19/17  0517 04/18/17  1837   WBC 10*3/mm3 17.95* 16.89* 19.74* 22.81*   HEMOGLOBIN g/dL 12.6* 13.3* 13.8* 16.6   HEMATOCRIT % 38.0* 39.4* 41.6* 49.3   PLATELETS 10*3/mm3 245 208 199 216   MONOCYTES % %  --  5.0 2.0 7.0          Results from last 7 days  Lab Units 04/20/17  0717 04/19/17  0517 04/18/17  1837   INR  1.69* 1.62* 1.49*      Microbiology Results (last 10 days)     Procedure Component Value - Date/Time    Blood Culture With MCKAYLA [[id number]]  (Normal) Collected:  04/21/17 0359    Lab Status:  Preliminary result Specimen:  Blood from Hand, Right Updated:  04/21/17 1701     Blood Culture No growth at less than 24 hours    Blood Culture With MCKAYLA [[id number]]  (Normal) Collected:  04/21/17 0352    Lab Status:  Preliminary result Specimen:  Blood from Hand, Right Updated:  04/21/17 1701     Blood Culture No growth at less than 24 hours    Rapid Strep A Screen [[id number]]  (Normal) Collected:  04/19/17 0730    Lab Status:  Final result Specimen:  Swab from Throat Updated:  04/19/17 0754     Strep A Ag Negative    Blood Culture With MCKAYLA [[id number]]  (Abnormal)  (Susceptibility) Collected:  04/19/17 0517    Lab Status:  Preliminary result Specimen:  Blood from Hand, Right Updated:  04/21/17 0728     Blood Culture Abnormal Stain (A)      Staphylococcus aureus, MRSA (C)      ARMANDO to follow.    Consider infectious disease consult to rule out distant focus of infection.  Methicillin resistant Staphylococcus aureus, Patient may be an isolation risk.        Isolated from Anaerobic Bottle     Gram Stain Result Anaerobic Bottle Gram positive cocci in clusters    Susceptibility      Staphylococcus aureus, MRSA     ARMANDO     Amoxicillin + Clavulanate <=4/2 ug/ml Resistant     Ampicillin >8 ug/ml Resistant     Ampicillin + Sulbactam <=8/4 ug/ml Resistant     Cefazolin 8 ug/ml Resistant     Ciprofloxacin >2 ug/ml Resistant     Clindamycin <=0.5 ug/ml Susceptible     Daptomycin <=0.5 ug/ml Susceptible     Erythromycin >4 ug/ml Resistant     Gentamicin <=4 ug/ml Susceptible     Levofloxacin >4 ug/ml Resistant     Linezolid 2 ug/ml Susceptible     Moxifloxacin 2 ug/ml Resistant     Oxacillin >2 ug/ml Resistant     Penicillin G >8 ug/ml Resistant      Quinupristin + Dalfopristin <=1 ug/ml Susceptible     Rifampin <=1 ug/ml Susceptible     Tetracycline <=4 ug/ml Susceptible     Trimethoprim + Sulfamethoxazole <=0.5/9.5 ug/ml Susceptible     Vancomycin 2 ug/ml Susceptible                    Blood Culture With MCKAYLA [[id number]]  (Abnormal)  (Susceptibility) Collected:  04/19/17 0510    Lab Status:  Final result Specimen:  Blood from Hand, Right Updated:  04/21/17 0727     Blood Culture Abnormal Stain (A)      Staphylococcus aureus, MRSA (C)      ARMANDO to follow.    Consider infectious disease consult to rule out distant focus of infection.  Methicillin resistant Staphylococcus aureus, Patient may be an isolation risk.        Isolated from Pediatric Bottle     Gram Stain Result Pediatric Bottle Gram positive cocci in clusters    Susceptibility      Staphylococcus aureus, MRSA     ARMANDO     Amoxicillin + Clavulanate <=4/2 ug/ml Resistant     Ampicillin >8 ug/ml Resistant     Ampicillin + Sulbactam <=8/4 ug/ml Resistant     Cefazolin 8 ug/ml Resistant     Ciprofloxacin >2 ug/ml Resistant     Clindamycin <=0.5 ug/ml Susceptible     Daptomycin <=0.5 ug/ml Susceptible     Erythromycin >4 ug/ml Resistant     Gentamicin <=4 ug/ml Susceptible     Levofloxacin >4 ug/ml Resistant     Linezolid 2 ug/ml Susceptible     Moxifloxacin 2 ug/ml Resistant     Oxacillin >2 ug/ml Resistant     Penicillin G >8 ug/ml Resistant     Quinupristin + Dalfopristin <=1 ug/ml Susceptible     Rifampin <=1 ug/ml Susceptible     Tetracycline <=4 ug/ml Susceptible     Trimethoprim + Sulfamethoxazole <=0.5/9.5 ug/ml Susceptible     Vancomycin 2 ug/ml Susceptible                    MRSA Screen Culture [[id number]]  (Normal) Collected:  04/18/17 2201    Lab Status:  Final result Specimen:  Swab from Nares Updated:  04/21/17 0635     MRSA SCREEN CX No Methicillin Resistant Staphylococcus aureus isolated    Acinetobacter Screen [[id number]]  (Normal) Collected:  04/18/17 2201    Lab Status:  Final result Specimen:   Swab from Axilla, Left Updated:  04/21/17 0636     ACINETOBACTER SCREEN CX No Acinetobacter isolated    VRE Culture [[id number]]  (Normal) Collected:  04/18/17 2201    Lab Status:  Final result Specimen:  Swab from Per Rectum Updated:  04/20/17 0619     VRE SCREEN CX No Vancomycin Resistant Enterococcus Isolated    Neisseria Gonorrhea, PCR [[id number]] Collected:  04/18/17 2034    Lab Status:  Final result Specimen:  Urine from Urine, Clean Catch Updated:  04/21/17 0715     Neisseria gonorrhoeae, MAURO Negative    Narrative:       Performed at:  [address] [place] [address]  [id number]  : Bong Everett PhD, Phone:  [phone]    Influenza Antigen [[id number]]  (Normal) Collected:  04/18/17 2033    Lab Status:  Final result Specimen:  Swab from Nasopharynx Updated:  04/18/17 2057     Influenza A Ag, EIA Negative     Influenza B Ag, EIA Negative    Blood Culture [[id number]]  (Abnormal)  (Susceptibility) Collected:  04/18/17 1900    Lab Status:  Final result Specimen:  Blood from Hand, Right Updated:  04/21/17 0724     Blood Culture Abnormal Stain (A)      Staphylococcus aureus, MRSA (C)      ARMANDO to follow.    Consider infectious disease consult to rule out distant focus of infection.  Methicillin resistant Staphylococcus aureus, Patient may be an isolation risk.        Isolated from Aerobic and Anaerobic Bottles     Gram Stain Result Aerobic Bottle Gram positive cocci in clusters      Anaerobic Bottle Gram positive cocci in clusters    Susceptibility      Staphylococcus aureus, MRSA     ARMANDO     Amoxicillin + Clavulanate <=4/2 ug/ml Resistant     Ampicillin >8 ug/ml Resistant     Ampicillin + Sulbactam <=8/4 ug/ml Resistant     Cefazolin 8 ug/ml Resistant     Ciprofloxacin >2 ug/ml Resistant     Clindamycin <=0.5 ug/ml Susceptible     Daptomycin <=0.5 ug/ml Susceptible     Erythromycin >4 ug/ml Resistant     Gentamicin <=4 ug/ml Susceptible     Levofloxacin >4 ug/ml Resistant     Linezolid 2  ug/ml Susceptible     Moxifloxacin 2 ug/ml Resistant     Oxacillin >2 ug/ml Resistant     Penicillin G >8 ug/ml Resistant     Quinupristin + Dalfopristin <=1 ug/ml Susceptible     Rifampin <=1 ug/ml Susceptible     Tetracycline <=4 ug/ml Susceptible     Trimethoprim + Sulfamethoxazole <=0.5/9.5 ug/ml Susceptible     Vancomycin 2 ug/ml Susceptible                    Blood Culture [[id number]]  (Abnormal)  (Susceptibility) Collected:  04/18/17 1850    Lab Status:  Final result Specimen:  Blood from Arm, Right Updated:  04/21/17 0724     Blood Culture Abnormal Stain (A)      Staphylococcus aureus, MRSA (C)      ARMANDO to follow.    Consider infectious disease consult to rule out distant focus of infection.  Methicillin resistant Staphylococcus aureus, Patient may be an isolation risk.        Isolated from Aerobic and Anaerobic Bottles     Gram Stain Result Aerobic Bottle Gram positive cocci in clusters      Anaerobic Bottle Gram positive cocci in clusters    Susceptibility      Staphylococcus aureus, MRSA     ARMANDO     Amoxicillin + Clavulanate <=4/2 ug/ml Resistant     Ampicillin >8 ug/ml Resistant     Ampicillin + Sulbactam <=8/4 ug/ml Resistant     Cefazolin 8 ug/ml Resistant     Ciprofloxacin >2 ug/ml Resistant     Clindamycin <=0.5 ug/ml Susceptible     Daptomycin <=0.5 ug/ml Susceptible     Erythromycin >4 ug/ml Resistant     Gentamicin <=4 ug/ml Susceptible     Levofloxacin >4 ug/ml Resistant     Linezolid 2 ug/ml Susceptible     Moxifloxacin 2 ug/ml Resistant     Oxacillin >2 ug/ml Resistant     Penicillin G >8 ug/ml Resistant     Quinupristin + Dalfopristin <=1 ug/ml Susceptible     Rifampin <=1 ug/ml Susceptible     Tetracycline <=4 ug/ml Susceptible     Trimethoprim + Sulfamethoxazole <=0.5/9.5 ug/ml Susceptible     Vancomycin 2 ug/ml Susceptible                    Influenza Antigen [[id number]]  (Normal) Collected:  04/13/17 1838    Lab Status:  Final result Specimen:  Swab from Nasopharynx Updated:  04/13/17 1857      Influenza A Ag, EIA Negative     Influenza B Ag, EIA Negative    Urine Culture [[id number]]  (Abnormal)  (Susceptibility) Collected:  04/13/17 1837    Lab Status:  Final result Specimen:  Urine from Urine, Clean Catch Updated:  04/18/17 1307     Urine Culture 10,000-20,000 CFU/mL Staphylococcus aureus, MRSA (C)        Methicillin resistant Staph aureus, patient may be an isolation risk.       Susceptibility      Staphylococcus aureus, MRSA     ARMANDO     Amoxicillin + Clavulanate >4/2 ug/ml Resistant     Ampicillin >8 ug/ml Resistant     Ampicillin + Sulbactam 16/8 ug/ml Resistant     Cefazolin 16 ug/ml Resistant     Ciprofloxacin >2 ug/ml Resistant     Daptomycin <=0.5 ug/ml Susceptible     Gentamicin <=4 ug/ml Susceptible     Levofloxacin >4 ug/ml Resistant     Linezolid 4 ug/ml Susceptible     Nitrofurantoin <=32 ug/ml Susceptible     Oxacillin >2 ug/ml Resistant     Penicillin G >8 ug/ml Resistant     Quinupristin + Dalfopristin <=1 ug/ml Susceptible     Rifampin <=1 ug/ml Susceptible     Tetracycline <=4 ug/ml Susceptible     Trimethoprim + Sulfamethoxazole <=0.5/9.5 ug/ml Susceptible     Vancomycin 2 ug/ml Susceptible                            I have reviewed the medications.      Current Facility-Administered Medications:   •  acetaminophen (TYLENOL) tablet 650 mg, 650 mg, Oral, Q6H PRN, Janett Pabon DO, 650 mg at 04/20/17 1309  •  aspirin chewable tablet 324 mg, 324 mg, Oral, Daily, Camden Mcdonald MD, 324 mg at 04/21/17 0828  •  brimonidine-timolol (COMBIGAN) 0.2-0.5 % ophthalmic solution 1 drop, 1 drop, Both Eyes, Q12H, Umesh Cross MD, 1 drop at 04/21/17 2024  •  buPROPion SR (WELLBUTRIN SR) 12 hr tablet 150 mg, 150 mg, Oral, Q12H, Janett Pabon DO, 150 mg at 04/21/17 2023  •  ceftaroline (TEFLARO) 600 mg/100 mL 0.9% NS (mbp), 600 mg, Intravenous, Q12H, Camden Mcdonald MD, 600 mg at 04/21/17 2022  •  dextrose (D50W) solution 25 g, 25 g, Intravenous, Q15 Min PRN, Janett Pabon,   •   dextrose (INSTA-GLUCOSE) 77.4 % gel 1 tube, 1 tube, Oral, Q15 Min PRN, Janett Pabon DO  •  diazePAM (VALIUM) tablet 5 mg, 5 mg, Oral, Q12H PRN, Janett Pabon DO, 5 mg at 04/18/17 2309  •  diazePAM (VALIUM) tablet 5 mg, 5 mg, Oral, Q12H, Camden Mcdonald MD, 5 mg at 04/21/17 2023  •  diphenhydrAMINE (BENADRYL) injection 12.5 mg, 12.5 mg, Intravenous, Q6H PRN, Camden Mcdonald MD, 12.5 mg at 04/21/17 0733  •  enoxaparin (LOVENOX) syringe 40 mg, 40 mg, Subcutaneous, Daily, Janett Pabon DO, 40 mg at 04/21/17 0828  •  famotidine (PEPCID) tablet 20 mg, 20 mg, Oral, BID, Janett Pabon DO, 20 mg at 04/21/17 1734  •  glucagon (human recombinant) (GLUCAGEN DIAGNOSTIC) injection 1 mg, 1 mg, Subcutaneous, Q15 Min PRN, Janett Pabon DO  •  HYDROmorphone (DILAUDID) injection 0.5 mg, 0.5 mg, Intravenous, Q4H PRN, Camden Mcdonald MD, 0.5 mg at 04/21/17 1744  •  insulin aspart (novoLOG) injection 0-7 Units, 0-7 Units, Subcutaneous, 4x Daily AC & at Bedtime, Janett Pabon DO  •  ipratropium-albuterol (DUO-NEB) nebulizer solution 3 mL, 3 mL, Nebulization, Q6H PRN, Janett Pabon DO  •  LORazepam (ATIVAN) injection 0.5 mg, 0.5 mg, Intravenous, Q4H PRN, Camden Mcdonald MD  •  [START ON 4/22/2017] metoprolol succinate XL (TOPROL-XL) 24 hr tablet 50 mg, 50 mg, Oral, Q24H, Patrice Parrish MD  •  Morphine (MS CONTIN) 12 hr tablet 30 mg, 30 mg, Oral, Q12H, Camden Mcdonald MD, 30 mg at 04/21/17 2022  •  oxyCODONE-acetaminophen (PERCOCET) 5-325 MG per tablet 1 tablet, 1 tablet, Oral, Q6H PRN, Janett Pabon DO, 1 tablet at 04/20/17 0650  •  vancomycin 1750 mg in sodium chloride 0.9% 500 mL IVPB, 1,750 mg, Intravenous, Q8H, Last Rate: 250 mL/hr at 04/21/17 1122, 1,750 mg at 04/21/17 2022 **AND** Pharmacy to dose vancomycin, , Does not apply, Continuous PRN, Camden Mcdonald MD  •  phenazopyridine (PYRIDIUM) tablet 200 mg, 200 mg, Oral, TID PRN, Janett Pabon DO, 200 mg at 04/19/17  0925  •  predniSONE (DELTASONE) tablet 20 mg, 20 mg, Oral, Daily, Janett Pabon DO, 20 mg at 04/21/17 0827  •  promethazine (PHENERGAN) tablet 12.5 mg, 12.5 mg, Oral, Q6H PRN, Janett Pabon DO, 12.5 mg at 04/20/17 0412  •  rifAMPin (RIFADIN) capsule 300 mg, 300 mg, Oral, Q12H, Camden Mcdonald MD, 300 mg at 04/21/17 2022  •  sodium chloride 0.9 % flush 1-10 mL, 1-10 mL, Intravenous, PRN, Janett Pabon DO  •  sodium chloride 0.9 % flush 10 mL, 10 mL, Intravenous, PRN, Moises Meyer MD, 10 mL at 04/18/17 1933  •  tamsulosin (FLOMAX) 24 hr capsule 0.4 mg, 0.4 mg, Oral, Nightly, Janett Pabon DO, 0.4 mg at 04/21/17 2022    Assessment/Plan     Problem List  Principal Problem:    Severe sepsis due to methicillin resistant Staphylococcus aureus (MRSA) with acute organ dysfunction  Active Problems:    UTI (urinary tract infection), bacterial    Acute kidney injury    Dehydration with hyponatremia    Acute gout of right ankle    Acute maxillary sinusitis    Elevated LFTs    Elevated INR    Gastroesophageal reflux disease    Anxiety    Primary osteoarthritis involving multiple joints    Essential hypertension    Headache    Mild intermittent asthma without complication    Abdominal pain, lower    Shoulder pain    Acute right ankle pain      Assessment/Plan    Disseminated MRSA infection  - repeat cultures with S. Aureus very rapidly positive.  - continue antibiotics with addition of rifampin and teflaro for objective and clinical worsening on vanc.    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

## 2017-04-23 LAB
GLUCOSE BLDC GLUCOMTR-MCNC: 114 MG/DL (ref 70–130)
GLUCOSE BLDC GLUCOMTR-MCNC: 115 MG/DL (ref 70–130)
GLUCOSE BLDC GLUCOMTR-MCNC: 116 MG/DL (ref 70–130)
GLUCOSE BLDC GLUCOMTR-MCNC: 120 MG/DL (ref 70–130)
VANCOMYCIN TROUGH SERPL-MCNC: 8.62 MCG/ML (ref 5–15)

## 2017-04-23 PROCEDURE — 94799 UNLISTED PULMONARY SVC/PX: CPT

## 2017-04-23 PROCEDURE — 80202 ASSAY OF VANCOMYCIN: CPT | Performed by: HOSPITALIST

## 2017-04-23 PROCEDURE — 82962 GLUCOSE BLOOD TEST: CPT

## 2017-04-23 PROCEDURE — 25010000002 VANCOMYCIN PER 500 MG: Performed by: HOSPITALIST

## 2017-04-23 PROCEDURE — 25010000002 HYDROMORPHONE PER 4 MG: Performed by: HOSPITALIST

## 2017-04-23 PROCEDURE — 63710000001 PREDNISONE PER 1 MG: Performed by: FAMILY MEDICINE

## 2017-04-23 PROCEDURE — 25010000002 ENOXAPARIN PER 10 MG: Performed by: FAMILY MEDICINE

## 2017-04-23 PROCEDURE — 99231 SBSQ HOSP IP/OBS SF/LOW 25: CPT | Performed by: HOSPITALIST

## 2017-04-23 RX ORDER — FLUCONAZOLE 100 MG/1
200 TABLET ORAL EVERY 24 HOURS
Status: DISPENSED | OUTPATIENT
Start: 2017-04-23 | End: 2017-04-26

## 2017-04-23 RX ADMIN — BUPROPION HYDROCHLORIDE 150 MG: 150 TABLET, FILM COATED, EXTENDED RELEASE ORAL at 21:51

## 2017-04-23 RX ADMIN — BUPROPION HYDROCHLORIDE 150 MG: 150 TABLET, FILM COATED, EXTENDED RELEASE ORAL at 08:35

## 2017-04-23 RX ADMIN — MORPHINE SULFATE 30 MG: 30 TABLET, EXTENDED RELEASE ORAL at 21:50

## 2017-04-23 RX ADMIN — PREDNISONE 20 MG: 20 TABLET ORAL at 08:34

## 2017-04-23 RX ADMIN — RIFAMPIN 300 MG: 300 CAPSULE ORAL at 08:33

## 2017-04-23 RX ADMIN — BRIMONIDINE TARTRATE, TIMOLOL MALEATE 1 DROP: 2; 5 SOLUTION/ DROPS OPHTHALMIC at 21:52

## 2017-04-23 RX ADMIN — DIAZEPAM 5 MG: 5 TABLET ORAL at 19:47

## 2017-04-23 RX ADMIN — FAMOTIDINE 20 MG: 20 TABLET, FILM COATED ORAL at 17:04

## 2017-04-23 RX ADMIN — VANCOMYCIN HYDROCHLORIDE 2000 MG: 500 INJECTION, POWDER, LYOPHILIZED, FOR SOLUTION INTRAVENOUS at 14:23

## 2017-04-23 RX ADMIN — MORPHINE SULFATE 30 MG: 30 TABLET, EXTENDED RELEASE ORAL at 08:34

## 2017-04-23 RX ADMIN — FLUCONAZOLE 200 MG: 100 TABLET ORAL at 17:04

## 2017-04-23 RX ADMIN — VANCOMYCIN HYDROCHLORIDE 1750 MG: 500 INJECTION, POWDER, LYOPHILIZED, FOR SOLUTION INTRAVENOUS at 04:10

## 2017-04-23 RX ADMIN — DIAZEPAM 5 MG: 5 TABLET ORAL at 19:30

## 2017-04-23 RX ADMIN — CEFTAROLINE FOSAMIL 600 MG: 600 POWDER, FOR SOLUTION INTRAVENOUS at 19:47

## 2017-04-23 RX ADMIN — METOPROLOL SUCCINATE 50 MG: 50 TABLET, EXTENDED RELEASE ORAL at 08:35

## 2017-04-23 RX ADMIN — VANCOMYCIN HYDROCHLORIDE 2000 MG: 500 INJECTION, POWDER, LYOPHILIZED, FOR SOLUTION INTRAVENOUS at 21:00

## 2017-04-23 RX ADMIN — BRIMONIDINE TARTRATE, TIMOLOL MALEATE 1 DROP: 2; 5 SOLUTION/ DROPS OPHTHALMIC at 08:36

## 2017-04-23 RX ADMIN — HYDROMORPHONE HYDROCHLORIDE 0.5 MG: 1 INJECTION, SOLUTION INTRAMUSCULAR; INTRAVENOUS; SUBCUTANEOUS at 19:47

## 2017-04-23 RX ADMIN — RIFAMPIN 300 MG: 300 CAPSULE ORAL at 21:51

## 2017-04-23 RX ADMIN — ENOXAPARIN SODIUM 40 MG: 40 INJECTION SUBCUTANEOUS at 08:35

## 2017-04-23 RX ADMIN — FAMOTIDINE 20 MG: 20 TABLET, FILM COATED ORAL at 08:34

## 2017-04-23 RX ADMIN — CEFTAROLINE FOSAMIL 600 MG: 600 POWDER, FOR SOLUTION INTRAVENOUS at 08:30

## 2017-04-23 RX ADMIN — TAMSULOSIN HYDROCHLORIDE 0.4 MG: 0.4 CAPSULE ORAL at 21:50

## 2017-04-23 RX ADMIN — ASPIRIN 81 MG 324 MG: 81 TABLET ORAL at 08:33

## 2017-04-23 RX ADMIN — DIAZEPAM 5 MG: 5 TABLET ORAL at 08:34

## 2017-04-23 RX ADMIN — HYDROMORPHONE HYDROCHLORIDE 0.5 MG: 1 INJECTION, SOLUTION INTRAMUSCULAR; INTRAVENOUS; SUBCUTANEOUS at 02:29

## 2017-04-23 RX ADMIN — NYSTATIN 500000 UNITS: 500000 SUSPENSION ORAL at 21:51

## 2017-04-23 RX ADMIN — NYSTATIN 500000 UNITS: 500000 SUSPENSION ORAL at 17:04

## 2017-04-23 NOTE — PROGRESS NOTES
INPATIENT PROGRESS NOTE    Date of Admission: 4/18/2017  Length of Stay: 4  Primary Care Physician: Angelita Lu MD    Subjective   HPI:  Mr. Meyer is a 56 year old male who presents to the [facility] ED with complaints of diffuse joint pain, significantly worse in his left shoulder, fevers, and MRSA UTI.  He met sepsis criteria in the ED and has been admitted for further treatment.      Interval History:  04/22/17 - Patient seen and examined.  Continues to have fevers.  TTE with probable vegetation on aortic valve, FANG just done.  Denies any new concerns, no N/V/D.  Appetite fair. No dyspnea, chest pain. + arthralgias.    Steady clinical improvement.  Discussed with Dr. Coyne      Objective      Temp:  [98 °F (36.7 °C)-99.7 °F (37.6 °C)] 98 °F (36.7 °C)  Heart Rate:  [65-72] 72  Resp:  [18-20] 20  BP: (134-164)/(71-94) 134/94    Gen: Alert, appropriate, pleasant and interactive  HEENT: EOMI, ATNC, MMM  Neck: Supple  Heart: S1S2, RRR  Lungs: CTA bilaterally, no wheezes, rales, or rhonchi  Abdomen: Soft, NTND, BS+  Extremities: Warm, well-perfused, + pulses  Skin: P/W/D  Neuro: A/O x3, speech clear    Results Review:    I have reviewed the labs, radiology results and diagnostic studies.      Results from last 7 days  Lab Units 04/22/17  0507 04/21/17  0352 04/20/17  0701   SODIUM mmol/L 141 136* 134*   POTASSIUM mmol/L 3.5 4.0 3.5   CHLORIDE mmol/L 100 98 97*   TOTAL CO2 mmol/L 28.0 27.0 27.0   BUN mg/dL 15 15 13   CREATININE mg/dL 0.70 0.70 0.80   CALCIUM mg/dL 8.2* 7.7* 7.4*   BILIRUBIN mg/dL 1.1 1.8* 1.0   ALK PHOS U/L 122 118 104   ALT (SGPT) U/L 81* 101* 104*   AST (SGOT) U/L 49* 87* 107*   GLUCOSE mg/dL 104* 177* 132*         Results from last 7 days  Lab Units 04/22/17  0507 04/21/17  0352 04/20/17  0701 04/19/17  0517   WBC 10*3/mm3 14.37* 17.95* 16.89* 19.74*   HEMOGLOBIN g/dL 13.6* 12.6* 13.3* 13.8*   HEMATOCRIT % 41.9* 38.0* 39.4* 41.6*   PLATELETS 10*3/mm3 304 245 208 199   MONOCYTES % % 3.0  --  5.0 2.0          Results from last 7 days  Lab Units 04/20/17  0717 04/19/17  0517 04/18/17  1837   INR  1.69* 1.62* 1.49*      Microbiology Results (last 10 days)     Procedure Component Value - Date/Time    Blood Culture With MCKAYLA [[id number]]  (Normal) Collected:  04/21/17 0359    Lab Status:  Preliminary result Specimen:  Blood from Hand, Right Updated:  04/22/17 0501     Blood Culture No growth at 24 hours    Blood Culture With MCKAYLA [[id number]]  (Abnormal) Collected:  04/21/17 0352    Lab Status:  Preliminary result Specimen:  Blood from Hand, Right Updated:  04/22/17 2049     Blood Culture Abnormal Stain (A)     Gram Stain Result Anaerobic Bottle Gram positive cocci in clusters      Aerobic Bottle Gram positive cocci in clusters    Rapid Strep A Screen [[id number]]  (Normal) Collected:  04/19/17 0730    Lab Status:  Final result Specimen:  Swab from Throat Updated:  04/19/17 0754     Strep A Ag Negative    Beta Strep Culture, Throat [[id number]]  (Normal) Collected:  04/19/17 0730    Lab Status:  Final result Specimen:  Swab from Throat Updated:  04/22/17 0905     Throat Culture, Beta Strep No Group A Streptococcus Isolated    Blood Culture With MCKAYLA [[id number]]  (Abnormal)  (Susceptibility) Collected:  04/19/17 0517    Lab Status:  Preliminary result Specimen:  Blood from Hand, Right Updated:  04/21/17 0728     Blood Culture Abnormal Stain (A)      Staphylococcus aureus, MRSA (C)      ARMANDO to follow.    Consider infectious disease consult to rule out distant focus of infection.  Methicillin resistant Staphylococcus aureus, Patient may be an isolation risk.        Isolated from Anaerobic Bottle     Gram Stain Result Anaerobic Bottle Gram positive cocci in clusters    Susceptibility      Staphylococcus aureus, MRSA     ARMANDO     Amoxicillin + Clavulanate <=4/2 ug/ml Resistant     Ampicillin >8 ug/ml Resistant     Ampicillin + Sulbactam <=8/4 ug/ml Resistant     Cefazolin 8 ug/ml Resistant     Ciprofloxacin >2 ug/ml Resistant      Clindamycin <=0.5 ug/ml Susceptible     Daptomycin <=0.5 ug/ml Susceptible     Erythromycin >4 ug/ml Resistant     Gentamicin <=4 ug/ml Susceptible     Levofloxacin >4 ug/ml Resistant     Linezolid 2 ug/ml Susceptible     Moxifloxacin 2 ug/ml Resistant     Oxacillin >2 ug/ml Resistant     Penicillin G >8 ug/ml Resistant     Quinupristin + Dalfopristin <=1 ug/ml Susceptible     Rifampin <=1 ug/ml Susceptible     Tetracycline <=4 ug/ml Susceptible     Trimethoprim + Sulfamethoxazole <=0.5/9.5 ug/ml Susceptible     Vancomycin 2 ug/ml Susceptible                    Blood Culture With MCKAYLA [[id number]]  (Abnormal)  (Susceptibility) Collected:  04/19/17 0510    Lab Status:  Final result Specimen:  Blood from Hand, Right Updated:  04/21/17 0727     Blood Culture Abnormal Stain (A)      Staphylococcus aureus, MRSA (C)      ARMANDO to follow.    Consider infectious disease consult to rule out distant focus of infection.  Methicillin resistant Staphylococcus aureus, Patient may be an isolation risk.        Isolated from Pediatric Bottle     Gram Stain Result Pediatric Bottle Gram positive cocci in clusters    Susceptibility      Staphylococcus aureus, MRSA     ARMANDO     Amoxicillin + Clavulanate <=4/2 ug/ml Resistant     Ampicillin >8 ug/ml Resistant     Ampicillin + Sulbactam <=8/4 ug/ml Resistant     Cefazolin 8 ug/ml Resistant     Ciprofloxacin >2 ug/ml Resistant     Clindamycin <=0.5 ug/ml Susceptible     Daptomycin <=0.5 ug/ml Susceptible     Erythromycin >4 ug/ml Resistant     Gentamicin <=4 ug/ml Susceptible     Levofloxacin >4 ug/ml Resistant     Linezolid 2 ug/ml Susceptible     Moxifloxacin 2 ug/ml Resistant     Oxacillin >2 ug/ml Resistant     Penicillin G >8 ug/ml Resistant     Quinupristin + Dalfopristin <=1 ug/ml Susceptible     Rifampin <=1 ug/ml Susceptible     Tetracycline <=4 ug/ml Susceptible     Trimethoprim + Sulfamethoxazole <=0.5/9.5 ug/ml Susceptible     Vancomycin 2 ug/ml Susceptible                    MRSA  Screen Culture [[id number]]  (Normal) Collected:  04/18/17 2201    Lab Status:  Final result Specimen:  Swab from Nares Updated:  04/21/17 0635     MRSA SCREEN CX No Methicillin Resistant Staphylococcus aureus isolated    Acinetobacter Screen [[id number]]  (Normal) Collected:  04/18/17 2201    Lab Status:  Final result Specimen:  Swab from Axilla, Left Updated:  04/21/17 0636     ACINETOBACTER SCREEN CX No Acinetobacter isolated    VRE Culture [[id number]]  (Normal) Collected:  04/18/17 2201    Lab Status:  Final result Specimen:  Swab from Per Rectum Updated:  04/20/17 0619     VRE SCREEN CX No Vancomycin Resistant Enterococcus Isolated    Neisseria Gonorrhea, PCR [[id number]] Collected:  04/18/17 2034    Lab Status:  Final result Specimen:  Urine from Urine, Clean Catch Updated:  04/21/17 0715     Neisseria gonorrhoeae, MAURO Negative    Narrative:       Performed at:  [address] [place] [address]  [id number]  : Bong Everett PhD, Phone:  [phone]    Influenza Antigen [[id number]]  (Normal) Collected:  04/18/17 2033    Lab Status:  Final result Specimen:  Swab from Nasopharynx Updated:  04/18/17 2057     Influenza A Ag, EIA Negative     Influenza B Ag, EIA Negative    Blood Culture [[id number]]  (Abnormal)  (Susceptibility) Collected:  04/18/17 1900    Lab Status:  Final result Specimen:  Blood from Hand, Right Updated:  04/21/17 0724     Blood Culture Abnormal Stain (A)      Staphylococcus aureus, MRSA (C)      ARMANDO to follow.    Consider infectious disease consult to rule out distant focus of infection.  Methicillin resistant Staphylococcus aureus, Patient may be an isolation risk.        Isolated from Aerobic and Anaerobic Bottles     Gram Stain Result Aerobic Bottle Gram positive cocci in clusters      Anaerobic Bottle Gram positive cocci in clusters    Susceptibility      Staphylococcus aureus, MRSA     ARMANDO     Amoxicillin + Clavulanate <=4/2 ug/ml Resistant     Ampicillin >8 ug/ml  Resistant     Ampicillin + Sulbactam <=8/4 ug/ml Resistant     Cefazolin 8 ug/ml Resistant     Ciprofloxacin >2 ug/ml Resistant     Clindamycin <=0.5 ug/ml Susceptible     Daptomycin <=0.5 ug/ml Susceptible     Erythromycin >4 ug/ml Resistant     Gentamicin <=4 ug/ml Susceptible     Levofloxacin >4 ug/ml Resistant     Linezolid 2 ug/ml Susceptible     Moxifloxacin 2 ug/ml Resistant     Oxacillin >2 ug/ml Resistant     Penicillin G >8 ug/ml Resistant     Quinupristin + Dalfopristin <=1 ug/ml Susceptible     Rifampin <=1 ug/ml Susceptible     Tetracycline <=4 ug/ml Susceptible     Trimethoprim + Sulfamethoxazole <=0.5/9.5 ug/ml Susceptible     Vancomycin 2 ug/ml Susceptible                    Blood Culture [[id number]]  (Abnormal)  (Susceptibility) Collected:  04/18/17 7000    Lab Status:  Final result Specimen:  Blood from Arm, Right Updated:  04/21/17 1123     Blood Culture Abnormal Stain (A)      Staphylococcus aureus, MRSA (C)      ARMANDO to follow.    Consider infectious disease consult to rule out distant focus of infection.  Methicillin resistant Staphylococcus aureus, Patient may be an isolation risk.        Isolated from Aerobic and Anaerobic Bottles     Gram Stain Result Aerobic Bottle Gram positive cocci in clusters      Anaerobic Bottle Gram positive cocci in clusters    Susceptibility      Staphylococcus aureus, MRSA     ARMANDO     Amoxicillin + Clavulanate <=4/2 ug/ml Resistant     Ampicillin >8 ug/ml Resistant     Ampicillin + Sulbactam <=8/4 ug/ml Resistant     Cefazolin 8 ug/ml Resistant     Ciprofloxacin >2 ug/ml Resistant     Clindamycin <=0.5 ug/ml Susceptible     Daptomycin <=0.5 ug/ml Susceptible     Erythromycin >4 ug/ml Resistant     Gentamicin <=4 ug/ml Susceptible     Levofloxacin >4 ug/ml Resistant     Linezolid 2 ug/ml Susceptible     Moxifloxacin 2 ug/ml Resistant     Oxacillin >2 ug/ml Resistant     Penicillin G >8 ug/ml Resistant     Quinupristin + Dalfopristin <=1 ug/ml Susceptible     Rifampin  <=1 ug/ml Susceptible     Tetracycline <=4 ug/ml Susceptible     Trimethoprim + Sulfamethoxazole <=0.5/9.5 ug/ml Susceptible     Vancomycin 2 ug/ml Susceptible                    Influenza Antigen [[id number]]  (Normal) Collected:  04/13/17 1838    Lab Status:  Final result Specimen:  Swab from Nasopharynx Updated:  04/13/17 1857     Influenza A Ag, EIA Negative     Influenza B Ag, EIA Negative    Urine Culture [[id number]]  (Abnormal)  (Susceptibility) Collected:  04/13/17 1837    Lab Status:  Final result Specimen:  Urine from Urine, Clean Catch Updated:  04/18/17 1307     Urine Culture 10,000-20,000 CFU/mL Staphylococcus aureus, MRSA (C)        Methicillin resistant Staph aureus, patient may be an isolation risk.       Susceptibility      Staphylococcus aureus, MRSA     ARMANDO     Amoxicillin + Clavulanate >4/2 ug/ml Resistant     Ampicillin >8 ug/ml Resistant     Ampicillin + Sulbactam 16/8 ug/ml Resistant     Cefazolin 16 ug/ml Resistant     Ciprofloxacin >2 ug/ml Resistant     Daptomycin <=0.5 ug/ml Susceptible     Gentamicin <=4 ug/ml Susceptible     Levofloxacin >4 ug/ml Resistant     Linezolid 4 ug/ml Susceptible     Nitrofurantoin <=32 ug/ml Susceptible     Oxacillin >2 ug/ml Resistant     Penicillin G >8 ug/ml Resistant     Quinupristin + Dalfopristin <=1 ug/ml Susceptible     Rifampin <=1 ug/ml Susceptible     Tetracycline <=4 ug/ml Susceptible     Trimethoprim + Sulfamethoxazole <=0.5/9.5 ug/ml Susceptible     Vancomycin 2 ug/ml Susceptible                            I have reviewed the medications.      Current Facility-Administered Medications:   •  acetaminophen (TYLENOL) tablet 650 mg, 650 mg, Oral, Q6H PRN, Janett Pabon DO, 650 mg at 04/20/17 1309  •  aspirin chewable tablet 324 mg, 324 mg, Oral, Daily, Camden Mcdonald MD, 324 mg at 04/22/17 0832  •  brimonidine-timolol (COMBIGAN) 0.2-0.5 % ophthalmic solution 1 drop, 1 drop, Both Eyes, Q12H, Umesh Cross MD, 1 drop at 04/22/17 2183  •   buPROPion SR (WELLBUTRIN SR) 12 hr tablet 150 mg, 150 mg, Oral, Q12H, Janett Pabon DO, 150 mg at 04/22/17 2052  •  ceftaroline (TEFLARO) 600 mg/100 mL 0.9% NS (mbp), 600 mg, Intravenous, Q12H, Camden Mcdonald MD, 600 mg at 04/22/17 1938  •  dextrose (D50W) solution 25 g, 25 g, Intravenous, Q15 Min PRN, Janett Pabon DO  •  dextrose (INSTA-GLUCOSE) 77.4 % gel 1 tube, 1 tube, Oral, Q15 Min PRN, Janett Pabon DO  •  diazePAM (VALIUM) tablet 5 mg, 5 mg, Oral, Q12H PRN, Janett Pabon DO, 5 mg at 04/18/17 2309  •  diazePAM (VALIUM) tablet 5 mg, 5 mg, Oral, Q12H, Camden Mcdonald MD, 5 mg at 04/22/17 1939  •  diphenhydrAMINE (BENADRYL) injection 12.5 mg, 12.5 mg, Intravenous, Q6H PRN, Camden Mcdonald MD, 12.5 mg at 04/21/17 0733  •  enoxaparin (LOVENOX) syringe 40 mg, 40 mg, Subcutaneous, Daily, Janett Pabon DO, 40 mg at 04/22/17 0831  •  famotidine (PEPCID) tablet 20 mg, 20 mg, Oral, BID, Janett Pabon DO, 20 mg at 04/22/17 1717  •  glucagon (human recombinant) (GLUCAGEN DIAGNOSTIC) injection 1 mg, 1 mg, Subcutaneous, Q15 Min PRN, Janett Pabon DO  •  HYDROmorphone (DILAUDID) injection 0.5 mg, 0.5 mg, Intravenous, Q4H PRN, Camden Mcdonald MD, 0.5 mg at 04/22/17 0221  •  insulin aspart (novoLOG) injection 0-7 Units, 0-7 Units, Subcutaneous, 4x Daily AC & at Bedtime, Janett Pabon DO  •  ipratropium-albuterol (DUO-NEB) nebulizer solution 3 mL, 3 mL, Nebulization, Q6H PRN, Janett Pabon DO  •  LORazepam (ATIVAN) injection 0.5 mg, 0.5 mg, Intravenous, Q4H PRN, Camden Mcdonald MD  •  metoprolol succinate XL (TOPROL-XL) 24 hr tablet 50 mg, 50 mg, Oral, Q24H, Patrice Parrish MD, 50 mg at 04/22/17 0833  •  Morphine (MS CONTIN) 12 hr tablet 30 mg, 30 mg, Oral, Q12H, Camden Mcdonald MD, 30 mg at 04/22/17 2052  •  ondansetron (ZOFRAN) injection 4 mg, 4 mg, Intravenous, Q6H PRN, Camden Mcdonald MD  •  oxyCODONE-acetaminophen (PERCOCET) 5-325 MG per tablet 1  tablet, 1 tablet, Oral, Q6H PRN, Janett Pabon, DO, 1 tablet at 04/22/17 0116  •  vancomycin 1750 mg in sodium chloride 0.9% 500 mL IVPB, 1,750 mg, Intravenous, Q8H, Last Rate: 250 mL/hr at 04/22/17 1254, 1,750 mg at 04/22/17 1938 **AND** Pharmacy to dose vancomycin, , Does not apply, Continuous PRN, Camden Mcdonald MD  •  phenazopyridine (PYRIDIUM) tablet 200 mg, 200 mg, Oral, TID PRN, Janett Pabon, DO, 200 mg at 04/19/17 0925  •  predniSONE (DELTASONE) tablet 20 mg, 20 mg, Oral, Daily, Janett Pabon DO, 20 mg at 04/22/17 0833  •  promethazine (PHENERGAN) tablet 12.5 mg, 12.5 mg, Oral, Q6H PRN, Janett Pabon DO, 12.5 mg at 04/22/17 0827  •  rifAMPin (RIFADIN) capsule 300 mg, 300 mg, Oral, Q12H, Camden Mcdonald MD, 300 mg at 04/22/17 2052  •  sodium chloride 0.9 % flush 1-10 mL, 1-10 mL, Intravenous, PRN, Janett Pabon, DO  •  sodium chloride 0.9 % flush 10 mL, 10 mL, Intravenous, PRN, Moises Meyer MD, 10 mL at 04/18/17 1933  •  tamsulosin (FLOMAX) 24 hr capsule 0.4 mg, 0.4 mg, Oral, Nightly, Janett Pabon, DO, 0.4 mg at 04/22/17 2052    Assessment/Plan     Problem List  Principal Problem:    Severe sepsis due to methicillin resistant Staphylococcus aureus (MRSA) with acute organ dysfunction  Active Problems:    UTI (urinary tract infection), bacterial    Acute kidney injury    Dehydration with hyponatremia    Acute gout of right ankle    Acute maxillary sinusitis    Elevated LFTs    Elevated INR    Gastroesophageal reflux disease    Anxiety    Primary osteoarthritis involving multiple joints    Essential hypertension    Headache    Mild intermittent asthma without complication    Abdominal pain, lower    Shoulder pain    Acute right ankle pain      Assessment/Plan    Disseminated MRSA infection  - repeat cultures with S. Aureus very rapidly positive.  - continue antibiotics with addition of rifampin and teflaro for objective and clinical worsening on vanc.  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

## 2017-04-23 NOTE — PLAN OF CARE
Problem: Patient Care Overview (Adult)  Goal: Plan of Care Review  Outcome: Ongoing (interventions implemented as appropriate)    04/23/17 0444   Coping/Psychosocial Response Interventions   Plan Of Care Reviewed With patient   Patient Care Overview   Progress improving   Outcome Evaluation   Outcome Summary/Follow up Plan Patient has been able to sleep through most of the night. Pain medication only administered once.       Goal: Adult Individualization and Mutuality  Outcome: Ongoing (interventions implemented as appropriate)    04/23/17 0444   Individualization   Patient Specific Goals Pain control       Goal: Discharge Needs Assessment  Outcome: Ongoing (interventions implemented as appropriate)    04/23/17 0444   Discharge Needs Assessment   Concerns To Be Addressed financial/insurance concerns;medication concerns   Readmission Within The Last 30 Days no previous admission in last 30 days   Self-Care   Equipment Currently Used at Home cane, straight   Living Environment   Transportation Available car;family or friend will provide         Problem: Pain, Acute (Adult)  Goal: Identify Related Risk Factors and Signs and Symptoms  Outcome: Ongoing (interventions implemented as appropriate)    04/23/17 0444   Pain, Acute   Related Risk Factors (Acute Pain) infection;fear;procedure/treatment   Signs and Symptoms (Acute Pain) fatigue/weakness;verbalization of pain descriptors       Goal: Acceptable Pain Control/Comfort Level  Outcome: Ongoing (interventions implemented as appropriate)    04/23/17 0444   Pain, Acute (Adult)   Acceptable Pain Control/Comfort Level making progress toward outcome

## 2017-04-23 NOTE — PROGRESS NOTES
"Pharmacokinetic Follow-up Note - Onesimo Meyer is a 56 y.o. male  70\" (177.8 cm) 228 lb 12.8 oz (104 kg)    Indication for use: MRSA bacteremia      Results from last 7 days     Lab Units 04/22/17  0507 04/21/17  0352 04/20/17  0701   WBC 10*3/mm3 14.37* 17.95* 16.89*   CREATININE mg/dL 0.70 0.70 0.80      Estimated Creatinine Clearance: 145.7 mL/min (by C-G formula based on Cr of 0.7).  Temp Readings from Last 1 Encounters:   04/23/17 98.7 °F (37.1 °C) (Oral)     Lab Results   Component [facility]    [facility]         Current Vancomycin Dose:  1750 mg IVPB every 8 hours.    Assessment/Plan:  Vancomycin trough prior to the 7th dose at steady state was sub therapeutic. Will increase to maximum recommended dose per hospital policy: 2 g IV q8h (6 g per day). Target trough of 15-20 is not likely to be achieved at this dose. Consider alternative therapy by increasing ceftaroline dose to q8h and/or switching vancomycin to daptomycin.  Pharmacy will continue to monitor renal function and will schedule vancomycin levels as needed.    Eladio Rodriguez [facility]   04/23/17 12:40 PM    "

## 2017-04-23 NOTE — PLAN OF CARE
Problem: Patient Care Overview (Adult)  Goal: Plan of Care Review  Outcome: Ongoing (interventions implemented as appropriate)    04/23/17 6942   Coping/Psychosocial Response Interventions   Plan Of Care Reviewed With patient;spouse   Patient Care Overview   Progress improving   Outcome Evaluation   Outcome Summary/Follow up Plan pt pain controlled, tolerating IV ABX, ambulated in hallway, started treatment for thrush

## 2017-04-24 LAB
ALBUMIN SERPL-MCNC: 2.8 G/DL (ref 3.5–5)
ALBUMIN/GLOB SERPL: 0.8 G/DL (ref 1–2)
ALP SERPL-CCNC: 119 U/L (ref 38–126)
ALT SERPL W P-5'-P-CCNC: 57 U/L (ref 13–69)
ANION GAP SERPL CALCULATED.3IONS-SCNC: 14.5 MMOL/L
AST SERPL-CCNC: 33 U/L (ref 15–46)
BACTERIA SPEC AEROBE CULT: ABNORMAL
BASOPHILS # BLD AUTO: 0.03 10*3/MM3 (ref 0–0.2)
BASOPHILS NFR BLD AUTO: 0.2 % (ref 0–2.5)
BILIRUB SERPL-MCNC: 1 MG/DL (ref 0.2–1.3)
BUN BLD-MCNC: 13 MG/DL (ref 7–20)
BUN/CREAT SERPL: 18.6 (ref 6.3–21.9)
CALCIUM SPEC-SCNC: 8.1 MG/DL (ref 8.4–10.2)
CHLORIDE SERPL-SCNC: 99 MMOL/L (ref 98–107)
CK SERPL-CCNC: <20 U/L (ref 30–170)
CO2 SERPL-SCNC: 28 MMOL/L (ref 26–30)
CREAT BLD-MCNC: 0.7 MG/DL (ref 0.6–1.3)
DEPRECATED RDW RBC AUTO: 47.1 FL (ref 37–54)
EOSINOPHIL # BLD AUTO: 0.15 10*3/MM3 (ref 0–0.7)
EOSINOPHIL NFR BLD AUTO: 1.2 % (ref 0–7)
ERYTHROCYTE [DISTWIDTH] IN BLOOD BY AUTOMATED COUNT: 13.7 % (ref 11.5–14.5)
GFR SERPL CREATININE-BSD FRML MDRD: 118 ML/MIN/1.73
GLOBULIN UR ELPH-MCNC: 3.4 GM/DL
GLUCOSE BLD-MCNC: 116 MG/DL (ref 74–98)
GLUCOSE BLDC GLUCOMTR-MCNC: 118 MG/DL (ref 70–130)
GLUCOSE BLDC GLUCOMTR-MCNC: 126 MG/DL (ref 70–130)
GLUCOSE BLDC GLUCOMTR-MCNC: 139 MG/DL (ref 70–130)
GRAM STN SPEC: ABNORMAL
HCT VFR BLD AUTO: 44.2 % (ref 42–52)
HGB BLD-MCNC: 14.3 G/DL (ref 14–18)
IMM GRANULOCYTES # BLD: 0.13 10*3/MM3 (ref 0–0.06)
IMM GRANULOCYTES NFR BLD: 1.1 % (ref 0–0.6)
ISOLATED FROM: ABNORMAL
ISOLATED FROM: ABNORMAL
LYMPHOCYTES # BLD AUTO: 1.21 10*3/MM3 (ref 0.6–3.4)
LYMPHOCYTES NFR BLD AUTO: 9.8 % (ref 10–50)
MAGNESIUM SERPL-MCNC: 2 MG/DL (ref 1.6–2.3)
MCH RBC QN AUTO: 30 PG (ref 27–31)
MCHC RBC AUTO-ENTMCNC: 32.4 G/DL (ref 30–37)
MCV RBC AUTO: 92.7 FL (ref 80–94)
MONOCYTES # BLD AUTO: 0.97 10*3/MM3 (ref 0–0.9)
MONOCYTES NFR BLD AUTO: 7.9 % (ref 0–12)
NEUTROPHILS # BLD AUTO: 9.85 10*3/MM3 (ref 2–6.9)
NEUTROPHILS NFR BLD AUTO: 79.8 % (ref 37–80)
NRBC BLD MANUAL-RTO: 0 /100 WBC (ref 0–0)
PLATELET # BLD AUTO: 435 10*3/MM3 (ref 130–400)
PMV BLD AUTO: 10.4 FL (ref 6–12)
POTASSIUM BLD-SCNC: 3.5 MMOL/L (ref 3.5–5.1)
PROT SERPL-MCNC: 6.2 G/DL (ref 6.3–8.2)
RBC # BLD AUTO: 4.77 10*6/MM3 (ref 4.7–6.1)
SODIUM BLD-SCNC: 138 MMOL/L (ref 137–145)
WBC NRBC COR # BLD: 12.34 10*3/MM3 (ref 4.8–10.8)

## 2017-04-24 PROCEDURE — 99233 SBSQ HOSP IP/OBS HIGH 50: CPT | Performed by: INTERNAL MEDICINE

## 2017-04-24 PROCEDURE — 80053 COMPREHEN METABOLIC PANEL: CPT | Performed by: HOSPITALIST

## 2017-04-24 PROCEDURE — 63710000001 PREDNISONE PER 1 MG: Performed by: FAMILY MEDICINE

## 2017-04-24 PROCEDURE — 82962 GLUCOSE BLOOD TEST: CPT

## 2017-04-24 PROCEDURE — 25010000002 VANCOMYCIN PER 500 MG: Performed by: HOSPITALIST

## 2017-04-24 PROCEDURE — 85025 COMPLETE CBC W/AUTO DIFF WBC: CPT | Performed by: HOSPITALIST

## 2017-04-24 PROCEDURE — 94799 UNLISTED PULMONARY SVC/PX: CPT

## 2017-04-24 PROCEDURE — 97161 PT EVAL LOW COMPLEX 20 MIN: CPT

## 2017-04-24 PROCEDURE — 25010000002 HYDROMORPHONE PER 4 MG: Performed by: HOSPITALIST

## 2017-04-24 PROCEDURE — 83735 ASSAY OF MAGNESIUM: CPT | Performed by: HOSPITALIST

## 2017-04-24 PROCEDURE — 25010000002 ENOXAPARIN PER 10 MG: Performed by: FAMILY MEDICINE

## 2017-04-24 PROCEDURE — 82550 ASSAY OF CK (CPK): CPT | Performed by: HOSPITALIST

## 2017-04-24 PROCEDURE — 25010000002 DAPTOMYCIN PER 1 MG: Performed by: INTERNAL MEDICINE

## 2017-04-24 RX ORDER — PREDNISONE 10 MG/1
10 TABLET ORAL
Status: DISCONTINUED | OUTPATIENT
Start: 2017-04-25 | End: 2017-04-26 | Stop reason: HOSPADM

## 2017-04-24 RX ADMIN — BUPROPION HYDROCHLORIDE 150 MG: 150 TABLET, FILM COATED, EXTENDED RELEASE ORAL at 08:20

## 2017-04-24 RX ADMIN — NYSTATIN 500000 UNITS: 500000 SUSPENSION ORAL at 08:19

## 2017-04-24 RX ADMIN — FAMOTIDINE 20 MG: 20 TABLET, FILM COATED ORAL at 17:35

## 2017-04-24 RX ADMIN — RIFAMPIN 300 MG: 300 CAPSULE ORAL at 21:23

## 2017-04-24 RX ADMIN — ENOXAPARIN SODIUM 40 MG: 40 INJECTION SUBCUTANEOUS at 08:19

## 2017-04-24 RX ADMIN — DAPTOMYCIN 700 MG: 500 INJECTION, POWDER, LYOPHILIZED, FOR SOLUTION INTRAVENOUS at 14:16

## 2017-04-24 RX ADMIN — NYSTATIN 500000 UNITS: 500000 SUSPENSION ORAL at 12:14

## 2017-04-24 RX ADMIN — DIAZEPAM 5 MG: 5 TABLET ORAL at 08:20

## 2017-04-24 RX ADMIN — BUPROPION HYDROCHLORIDE 150 MG: 150 TABLET, FILM COATED, EXTENDED RELEASE ORAL at 21:22

## 2017-04-24 RX ADMIN — BRIMONIDINE TARTRATE, TIMOLOL MALEATE 1 DROP: 2; 5 SOLUTION/ DROPS OPHTHALMIC at 08:21

## 2017-04-24 RX ADMIN — FLUCONAZOLE 200 MG: 100 TABLET ORAL at 17:35

## 2017-04-24 RX ADMIN — IPRATROPIUM BROMIDE AND ALBUTEROL SULFATE 3 ML: .5; 3 SOLUTION RESPIRATORY (INHALATION) at 19:20

## 2017-04-24 RX ADMIN — METOPROLOL SUCCINATE 50 MG: 50 TABLET, EXTENDED RELEASE ORAL at 08:20

## 2017-04-24 RX ADMIN — MORPHINE SULFATE 30 MG: 30 TABLET, EXTENDED RELEASE ORAL at 08:20

## 2017-04-24 RX ADMIN — PREDNISONE 20 MG: 20 TABLET ORAL at 08:20

## 2017-04-24 RX ADMIN — HYDROMORPHONE HYDROCHLORIDE 0.5 MG: 1 INJECTION, SOLUTION INTRAMUSCULAR; INTRAVENOUS; SUBCUTANEOUS at 06:57

## 2017-04-24 RX ADMIN — FAMOTIDINE 20 MG: 20 TABLET, FILM COATED ORAL at 08:20

## 2017-04-24 RX ADMIN — MORPHINE SULFATE 30 MG: 30 TABLET, EXTENDED RELEASE ORAL at 21:22

## 2017-04-24 RX ADMIN — OXYCODONE HYDROCHLORIDE AND ACETAMINOPHEN 1 TABLET: 5; 325 TABLET ORAL at 17:40

## 2017-04-24 RX ADMIN — NYSTATIN 500000 UNITS: 500000 SUSPENSION ORAL at 17:35

## 2017-04-24 RX ADMIN — CEFTAROLINE FOSAMIL 600 MG: 600 POWDER, FOR SOLUTION INTRAVENOUS at 20:06

## 2017-04-24 RX ADMIN — DIAZEPAM 5 MG: 5 TABLET ORAL at 20:06

## 2017-04-24 RX ADMIN — TAMSULOSIN HYDROCHLORIDE 0.4 MG: 0.4 CAPSULE ORAL at 21:22

## 2017-04-24 RX ADMIN — NYSTATIN 500000 UNITS: 500000 SUSPENSION ORAL at 21:22

## 2017-04-24 RX ADMIN — ASPIRIN 81 MG 324 MG: 81 TABLET ORAL at 08:19

## 2017-04-24 RX ADMIN — CEFTAROLINE FOSAMIL 600 MG: 600 POWDER, FOR SOLUTION INTRAVENOUS at 08:19

## 2017-04-24 RX ADMIN — VANCOMYCIN HYDROCHLORIDE 2000 MG: 500 INJECTION, POWDER, LYOPHILIZED, FOR SOLUTION INTRAVENOUS at 05:18

## 2017-04-24 RX ADMIN — BRIMONIDINE TARTRATE, TIMOLOL MALEATE 1 DROP: 2; 5 SOLUTION/ DROPS OPHTHALMIC at 21:24

## 2017-04-24 RX ADMIN — RIFAMPIN 300 MG: 300 CAPSULE ORAL at 08:19

## 2017-04-24 NOTE — PLAN OF CARE
Problem: Patient Care Overview (Adult)  Goal: Plan of Care Review  Outcome: Ongoing (interventions implemented as appropriate)    04/24/17 1537   Coping/Psychosocial Response Interventions   Plan Of Care Reviewed With patient   Patient Care Overview   Progress no change         Problem: NPPV/CPAP (Adult)  Goal: Signs and Symptoms of Listed Potential Problems Will be Absent or Manageable (NPPV/CPAP)  Outcome: Ongoing (interventions implemented as appropriate)    04/24/17 1537   NPPV/CPAP   Problems Assessed (NPPV/CPAP) hypoxia/hypoxemia;skin breakdown   Problems Present (NPPV/CPAP) none

## 2017-04-24 NOTE — PROGRESS NOTES
INPATIENT PROGRESS NOTE    Date of Admission: 4/18/2017  Length of Stay: 5  Primary Care Physician: Angelita Lu MD    Subjective   HPI:  Mr. Meyer is a 56 year old male who presents to the [facility] ED with complaints of diffuse joint pain, significantly worse in his left shoulder, fevers, and MRSA UTI.  He met sepsis criteria in the ED and has been admitted for further treatment.      Interval History:  04/23/17 - Patient seen and examined.  Continues to have fevers.  TTE with probable vegetation on aortic valve, FANG just done.  Denies any new concerns, no N/V/D.  Appetite fair. No dyspnea, chest pain. + arthralgias.    Steady clinical improvement.  Discussed with Dr. Coyne      Objective      Temp:  [98 °F (36.7 °C)-98.7 °F (37.1 °C)] 98.4 °F (36.9 °C)  Heart Rate:  [63-65] 63  Resp:  [18] 18  BP: (115-143)/(74-89) 121/79    Gen: Alert, appropriate, pleasant and interactive  HEENT: EOMI, ATNC, MMM  Neck: Supple  Heart: S1S2, RRR  Lungs: CTA bilaterally, no wheezes, rales, or rhonchi  Abdomen: Soft, NTND, BS+  Extremities: Warm, well-perfused, + pulses  Skin: P/W/D  Neuro: A/O x3, speech clear    Results Review:    I have reviewed the labs, radiology results and diagnostic studies.      Results from last 7 days  Lab Units 04/22/17  0507 04/21/17  0352 04/20/17  0701   SODIUM mmol/L 141 136* 134*   POTASSIUM mmol/L 3.5 4.0 3.5   CHLORIDE mmol/L 100 98 97*   TOTAL CO2 mmol/L 28.0 27.0 27.0   BUN mg/dL 15 15 13   CREATININE mg/dL 0.70 0.70 0.80   CALCIUM mg/dL 8.2* 7.7* 7.4*   BILIRUBIN mg/dL 1.1 1.8* 1.0   ALK PHOS U/L 122 118 104   ALT (SGPT) U/L 81* 101* 104*   AST (SGOT) U/L 49* 87* 107*   GLUCOSE mg/dL 104* 177* 132*         Results from last 7 days  Lab Units 04/22/17  0507 04/21/17  0352 04/20/17  0701 04/19/17  0517   WBC 10*3/mm3 14.37* 17.95* 16.89* 19.74*   HEMOGLOBIN g/dL 13.6* 12.6* 13.3* 13.8*   HEMATOCRIT % 41.9* 38.0* 39.4* 41.6*   PLATELETS 10*3/mm3 304 245 208 199   MONOCYTES % % 3.0  --  5.0 2.0          Results from last 7 days  Lab Units 04/20/17  0717 04/19/17  0517 04/18/17  1837   INR  1.69* 1.62* 1.49*      Microbiology Results (last 10 days)     Procedure Component Value - Date/Time    Blood Culture With MCKAYLA [[id number]]  (Normal) Collected:  04/22/17 1101    Lab Status:  Preliminary result Specimen:  Blood from Hand, Left Updated:  04/23/17 1201     Blood Culture No growth at 24 hours    Blood Culture With MCKAYLA [[id number]]  (Normal) Collected:  04/22/17 1057    Lab Status:  Preliminary result Specimen:  Blood from Arm, Left Updated:  04/23/17 1201     Blood Culture No growth at 24 hours    Blood Culture With MCKAYLA [[id number]]  (Normal) Collected:  04/21/17 0359    Lab Status:  Preliminary result Specimen:  Blood from Hand, Right Updated:  04/23/17 0501     Blood Culture No growth at 2 days    Blood Culture With MCKAYLA [[id number]]  (Abnormal) Collected:  04/21/17 0352    Lab Status:  Preliminary result Specimen:  Blood from Hand, Right Updated:  04/22/17 2049     Blood Culture Abnormal Stain (A)     Gram Stain Result Anaerobic Bottle Gram positive cocci in clusters      Aerobic Bottle Gram positive cocci in clusters    Rapid Strep A Screen [[id number]]  (Normal) Collected:  04/19/17 0730    Lab Status:  Final result Specimen:  Swab from Throat Updated:  04/19/17 0754     Strep A Ag Negative    Beta Strep Culture, Throat [[id number]]  (Normal) Collected:  04/19/17 0730    Lab Status:  Final result Specimen:  Swab from Throat Updated:  04/22/17 0905     Throat Culture, Beta Strep No Group A Streptococcus Isolated    Blood Culture With MCKAYLA [[id number]]  (Abnormal)  (Susceptibility) Collected:  04/19/17 0517    Lab Status:  Preliminary result Specimen:  Blood from Hand, Right Updated:  04/21/17 0728     Blood Culture Abnormal Stain (A)      Staphylococcus aureus, MRSA (C)      ARMANDO to follow.    Consider infectious disease consult to rule out distant focus of infection.  Methicillin resistant Staphylococcus aureus, Patient  may be an isolation risk.        Isolated from Anaerobic Bottle     Gram Stain Result Anaerobic Bottle Gram positive cocci in clusters    Susceptibility      Staphylococcus aureus, MRSA     ARMANDO     Amoxicillin + Clavulanate <=4/2 ug/ml Resistant     Ampicillin >8 ug/ml Resistant     Ampicillin + Sulbactam <=8/4 ug/ml Resistant     Cefazolin 8 ug/ml Resistant     Ciprofloxacin >2 ug/ml Resistant     Clindamycin <=0.5 ug/ml Susceptible     Daptomycin <=0.5 ug/ml Susceptible     Erythromycin >4 ug/ml Resistant     Gentamicin <=4 ug/ml Susceptible     Levofloxacin >4 ug/ml Resistant     Linezolid 2 ug/ml Susceptible     Moxifloxacin 2 ug/ml Resistant     Oxacillin >2 ug/ml Resistant     Penicillin G >8 ug/ml Resistant     Quinupristin + Dalfopristin <=1 ug/ml Susceptible     Rifampin <=1 ug/ml Susceptible     Tetracycline <=4 ug/ml Susceptible     Trimethoprim + Sulfamethoxazole <=0.5/9.5 ug/ml Susceptible     Vancomycin 2 ug/ml Susceptible                    Blood Culture With MCKAYLA [[id number]]  (Abnormal)  (Susceptibility) Collected:  04/19/17 0510    Lab Status:  Final result Specimen:  Blood from Hand, Right Updated:  04/21/17 1156     Blood Culture Abnormal Stain (A)      Staphylococcus aureus, MRSA (C)      ARMANDO to follow.    Consider infectious disease consult to rule out distant focus of infection.  Methicillin resistant Staphylococcus aureus, Patient may be an isolation risk.        Isolated from Pediatric Bottle     Gram Stain Result Pediatric Bottle Gram positive cocci in clusters    Susceptibility      Staphylococcus aureus, MRSA     ARMANDO     Amoxicillin + Clavulanate <=4/2 ug/ml Resistant     Ampicillin >8 ug/ml Resistant     Ampicillin + Sulbactam <=8/4 ug/ml Resistant     Cefazolin 8 ug/ml Resistant     Ciprofloxacin >2 ug/ml Resistant     Clindamycin <=0.5 ug/ml Susceptible     Daptomycin <=0.5 ug/ml Susceptible     Erythromycin >4 ug/ml Resistant     Gentamicin <=4 ug/ml Susceptible     Levofloxacin >4 ug/ml  Resistant     Linezolid 2 ug/ml Susceptible     Moxifloxacin 2 ug/ml Resistant     Oxacillin >2 ug/ml Resistant     Penicillin G >8 ug/ml Resistant     Quinupristin + Dalfopristin <=1 ug/ml Susceptible     Rifampin <=1 ug/ml Susceptible     Tetracycline <=4 ug/ml Susceptible     Trimethoprim + Sulfamethoxazole <=0.5/9.5 ug/ml Susceptible     Vancomycin 2 ug/ml Susceptible                    MRSA Screen Culture [[id number]]  (Normal) Collected:  04/18/17 2201    Lab Status:  Final result Specimen:  Swab from Nares Updated:  04/21/17 0635     MRSA SCREEN CX No Methicillin Resistant Staphylococcus aureus isolated    Acinetobacter Screen [[id number]]  (Normal) Collected:  04/18/17 2201    Lab Status:  Final result Specimen:  Swab from Axilla, Left Updated:  04/21/17 0636     ACINETOBACTER SCREEN CX No Acinetobacter isolated    VRE Culture [[id number]]  (Normal) Collected:  04/18/17 2201    Lab Status:  Final result Specimen:  Swab from Per Rectum Updated:  04/20/17 0619     VRE SCREEN CX No Vancomycin Resistant Enterococcus Isolated    Neisseria Gonorrhea, PCR [[id number]] Collected:  04/18/17 2034    Lab Status:  Final result Specimen:  Urine from Urine, Clean Catch Updated:  04/21/17 0715     Neisseria gonorrhoeae, MAURO Negative    Narrative:       Performed at:  [address] [place] [address]  [id number]  : Bong Everett PhD, Phone:  [phone]    Influenza Antigen [[id number]]  (Normal) Collected:  04/18/17 2033    Lab Status:  Final result Specimen:  Swab from Nasopharynx Updated:  04/18/17 2057     Influenza A Ag, EIA Negative     Influenza B Ag, EIA Negative    Blood Culture [[id number]]  (Abnormal)  (Susceptibility) Collected:  04/18/17 1900    Lab Status:  Final result Specimen:  Blood from Hand, Right Updated:  04/21/17 0724     Blood Culture Abnormal Stain (A)      Staphylococcus aureus, MRSA (C)      ARMANDO to follow.    Consider infectious disease consult to rule out distant focus of  infection.  Methicillin resistant Staphylococcus aureus, Patient may be an isolation risk.        Isolated from Aerobic and Anaerobic Bottles     Gram Stain Result Aerobic Bottle Gram positive cocci in clusters      Anaerobic Bottle Gram positive cocci in clusters    Susceptibility      Staphylococcus aureus, MRSA     ARMANDO     Amoxicillin + Clavulanate <=4/2 ug/ml Resistant     Ampicillin >8 ug/ml Resistant     Ampicillin + Sulbactam <=8/4 ug/ml Resistant     Cefazolin 8 ug/ml Resistant     Ciprofloxacin >2 ug/ml Resistant     Clindamycin <=0.5 ug/ml Susceptible     Daptomycin <=0.5 ug/ml Susceptible     Erythromycin >4 ug/ml Resistant     Gentamicin <=4 ug/ml Susceptible     Levofloxacin >4 ug/ml Resistant     Linezolid 2 ug/ml Susceptible     Moxifloxacin 2 ug/ml Resistant     Oxacillin >2 ug/ml Resistant     Penicillin G >8 ug/ml Resistant     Quinupristin + Dalfopristin <=1 ug/ml Susceptible     Rifampin <=1 ug/ml Susceptible     Tetracycline <=4 ug/ml Susceptible     Trimethoprim + Sulfamethoxazole <=0.5/9.5 ug/ml Susceptible     Vancomycin 2 ug/ml Susceptible                    Blood Culture [[id number]]  (Abnormal)  (Susceptibility) Collected:  04/18/17 1850    Lab Status:  Final result Specimen:  Blood from Arm, Right Updated:  04/21/17 3411     Blood Culture Abnormal Stain (A)      Staphylococcus aureus, MRSA (C)      ARMANDO to follow.    Consider infectious disease consult to rule out distant focus of infection.  Methicillin resistant Staphylococcus aureus, Patient may be an isolation risk.        Isolated from Aerobic and Anaerobic Bottles     Gram Stain Result Aerobic Bottle Gram positive cocci in clusters      Anaerobic Bottle Gram positive cocci in clusters    Susceptibility      Staphylococcus aureus, MRSA     ARMANDO     Amoxicillin + Clavulanate <=4/2 ug/ml Resistant     Ampicillin >8 ug/ml Resistant     Ampicillin + Sulbactam <=8/4 ug/ml Resistant     Cefazolin 8 ug/ml Resistant     Ciprofloxacin >2 ug/ml  Resistant     Clindamycin <=0.5 ug/ml Susceptible     Daptomycin <=0.5 ug/ml Susceptible     Erythromycin >4 ug/ml Resistant     Gentamicin <=4 ug/ml Susceptible     Levofloxacin >4 ug/ml Resistant     Linezolid 2 ug/ml Susceptible     Moxifloxacin 2 ug/ml Resistant     Oxacillin >2 ug/ml Resistant     Penicillin G >8 ug/ml Resistant     Quinupristin + Dalfopristin <=1 ug/ml Susceptible     Rifampin <=1 ug/ml Susceptible     Tetracycline <=4 ug/ml Susceptible     Trimethoprim + Sulfamethoxazole <=0.5/9.5 ug/ml Susceptible     Vancomycin 2 ug/ml Susceptible                            I have reviewed the medications.      Current Facility-Administered Medications:   •  acetaminophen (TYLENOL) tablet 650 mg, 650 mg, Oral, Q6H PRN, Janett Pabon DO, 650 mg at 04/20/17 1309  •  aspirin chewable tablet 324 mg, 324 mg, Oral, Daily, Camden Mcdonald MD, 324 mg at 04/23/17 0833  •  brimonidine-timolol (COMBIGAN) 0.2-0.5 % ophthalmic solution 1 drop, 1 drop, Both Eyes, Q12H, Umesh Cross MD, 1 drop at 04/23/17 2152  •  buPROPion SR (WELLBUTRIN SR) 12 hr tablet 150 mg, 150 mg, Oral, Q12H, Janett Pabon DO, 150 mg at 04/23/17 2151  •  ceftaroline (TEFLARO) 600 mg/100 mL 0.9% NS (mbp), 600 mg, Intravenous, Q12H, Camden Mcdonald MD, 600 mg at 04/23/17 1947  •  dextrose (D50W) solution 25 g, 25 g, Intravenous, Q15 Min PRN, Janett Pabon DO  •  dextrose (INSTA-GLUCOSE) 77.4 % gel 1 tube, 1 tube, Oral, Q15 Min PRN, Janett Pabon DO  •  diazePAM (VALIUM) tablet 5 mg, 5 mg, Oral, Q12H PRN, Janett Pabon DO, 5 mg at 04/23/17 1930  •  diazePAM (VALIUM) tablet 5 mg, 5 mg, Oral, Q12H, Camedn Mcdonald MD, 5 mg at 04/23/17 1947  •  diphenhydrAMINE (BENADRYL) injection 12.5 mg, 12.5 mg, Intravenous, Q6H PRN, Camden Mcdonald MD, 12.5 mg at 04/21/17 0733  •  enoxaparin (LOVENOX) syringe 40 mg, 40 mg, Subcutaneous, Daily, Janett Pabon DO, 40 mg at 04/23/17 0835  •  famotidine (PEPCID) tablet 20 mg, 20  mg, Oral, BID, Janett Pabon DO, 20 mg at 04/23/17 1704  •  fluconazole (DIFLUCAN) tablet 200 mg, 200 mg, Oral, Q24H, Camden Mcdonald MD, 200 mg at 04/23/17 1704  •  glucagon (human recombinant) (GLUCAGEN DIAGNOSTIC) injection 1 mg, 1 mg, Subcutaneous, Q15 Min PRN, Janett Pabon DO  •  HYDROmorphone (DILAUDID) injection 0.5 mg, 0.5 mg, Intravenous, Q4H PRN, Camden Mcdonald MD, 0.5 mg at 04/23/17 1947  •  insulin aspart (novoLOG) injection 0-7 Units, 0-7 Units, Subcutaneous, 4x Daily AC & at Bedtime, Janett Pabon DO  •  ipratropium-albuterol (DUO-NEB) nebulizer solution 3 mL, 3 mL, Nebulization, Q6H PRN, Janett Pabon DO  •  metoprolol succinate XL (TOPROL-XL) 24 hr tablet 50 mg, 50 mg, Oral, Q24H, Patrice Parrish MD, 50 mg at 04/23/17 0835  •  Morphine (MS CONTIN) 12 hr tablet 30 mg, 30 mg, Oral, Q12H, Camden Mcdonald MD, 30 mg at 04/23/17 2150  •  nystatin (MYCOSTATIN) 588904 UNIT/ML suspension 500,000 Units, 5 mL, Oral, 4x Daily, Camden Mcdonald MD, 500,000 Units at 04/23/17 2151  •  ondansetron (ZOFRAN) injection 4 mg, 4 mg, Intravenous, Q6H PRN, Camden Mcdonald MD  •  oxyCODONE-acetaminophen (PERCOCET) 5-325 MG per tablet 1 tablet, 1 tablet, Oral, Q6H PRN, Janett Pabon DO, 1 tablet at 04/22/17 0116  •  vancomycin 2000 mg in sodium chloride 0.9% 500 mL IVPB, 2,000 mg, Intravenous, Q8H, 2,000 mg at 04/23/17 1423 **AND** Pharmacy to dose vancomycin, , Does not apply, Continuous PRN, Camden Mcdonald MD  •  predniSONE (DELTASONE) tablet 20 mg, 20 mg, Oral, Daily, Janett Pabon DO, 20 mg at 04/23/17 0834  •  promethazine (PHENERGAN) tablet 12.5 mg, 12.5 mg, Oral, Q6H PRN, Janett Pabon DO, 12.5 mg at 04/22/17 0827  •  rifAMPin (RIFADIN) capsule 300 mg, 300 mg, Oral, Q12H, Camden Mcdonald MD, 300 mg at 04/23/17 2151  •  sodium chloride 0.9 % flush 1-10 mL, 1-10 mL, Intravenous, PRN, Janett Pabon DO  •  sodium chloride 0.9 % flush 10 mL, 10 mL,  Intravenous, PRN, Moises Meyer MD, 10 mL at 04/18/17 1933  •  tamsulosin (FLOMAX) 24 hr capsule 0.4 mg, 0.4 mg, Oral, Nightly, Janett Pabon DO, 0.4 mg at 04/23/17 2150    Assessment/Plan     Problem List  Principal Problem:    Severe sepsis due to methicillin resistant Staphylococcus aureus (MRSA) with acute organ dysfunction  Active Problems:    UTI (urinary tract infection), bacterial    Acute kidney injury    Dehydration with hyponatremia    Acute gout of right ankle    Acute maxillary sinusitis    Elevated LFTs    Elevated INR    Gastroesophageal reflux disease    Anxiety    Primary osteoarthritis involving multiple joints    Essential hypertension    Headache    Mild intermittent asthma without complication    Abdominal pain, lower    Shoulder pain    Acute right ankle pain      Assessment/Plan    Disseminated MRSA infection  - repeat cultures with S. Aureus very rapidly positive.  - continue antibiotics with addition of rifampin and teflaro for objective and clinical worsening on vanc.  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    Probable Endocarditis  - TTE with probable vegetation on aortic valve  - FANG performed this AM  - treatment will be for no less than 6 weeks and may be 8 weeks  - cont vanc teflaro and rifampin for now    MRSA Arthropathy  - pts hot joints and joint pain are progressively improving    Sore Mouth  - c/w thrush  - nystatin swish and swallow  - diflucan      MAGDY  - resolved    LFTs  - follow, they continue to rise    INR  - remains elevated      - supportive care with pain control  - home medications as appropriate      - A1C 6.6% --> continue accuchecks, correctional insulin    DVT Prophylaxis: enoxaparin    Camden Mcdonald MD 04/23/17 11:18 PM

## 2017-04-24 NOTE — PROGRESS NOTES
Continued Stay Note  ZAC Nagel     Patient Name: Demi Meyer  MRN: [id number]  Today's Date: 4/24/2017    Admit Date: 4/18/2017          Discharge Plan       04/24/17 1248    Case Management/Social Work Plan    Plan Home with Home Health IV antibiotics     Additional Comments From list provided he choose [facility] and [facility]  antibiotics               Discharge Codes     None        Expected Discharge Date and Time     Expected Discharge Date Expected Discharge Time    Apr 24, 2017             Emilia Palomares

## 2017-04-24 NOTE — PLAN OF CARE
Problem: Patient Care Overview (Adult)  Goal: Plan of Care Review  Outcome: Ongoing (interventions implemented as appropriate)  Patient pain is controlled. Continues to tolerate IV ABX.Will continue to monitor and encourage ambulation.  Goal: Adult Individualization and Mutuality  Outcome: Ongoing (interventions implemented as appropriate)  Goal: Discharge Needs Assessment  Outcome: Ongoing (interventions implemented as appropriate)    Problem: Pain, Acute (Adult)  Goal: Identify Related Risk Factors and Signs and Symptoms  Outcome: Ongoing (interventions implemented as appropriate)  Goal: Acceptable Pain Control/Comfort Level  Outcome: Ongoing (interventions implemented as appropriate)

## 2017-04-24 NOTE — PROGRESS NOTES
[facility]    Name:  Demi Meyer   Age:  56 y.o.  Sex:  male  :  1960  MRN:  [id number]   Visit Number:  [id number]  Admission Date:  2017  Date Of Service:  17  Primary Care Physician:  Angelita Lu MD     LOS: 6 days :  Patient Care Team:  Angelita Lu MD as PCP - General:    History taken from:     patient    Chief Complaint:      Endocarditis  Subjective     Interval History:     Patient seen today.  Patient is a 56-year-old male who presented with severe sepsis.  He had a MRSA UTI, as well as diffuse joint pain in his left shoulder, right ankle, and right shoulder.  FANG was done by Dr. Parrish which showed vegetation on mitral valve.  Blood cultures of come positive for MRSA.  This has been persistent.  Dr. Coyne from infectious disease has been consulted.  Patient has been on vancomycin, ceftaroline and write for Ambien most recently.  Patient was changed from vancomycin to daptomycin by infectious disease this morning.  Most recent blood cultures 2 days ago were positive for MRSA again.  Patient states that he feels better.  His joint pain is improving.  He denies any history of IV drug abuse, high risk sexual behavior, or any other HIV risk factors.  He has hadn't had any weight loss.  He did have a history of recent recurrent pathogenic infection specifically an infection on his right neck.  He claims that in March she had a right cervical skin abscess, treated with Bactrim and incision and drainage.  He also had trouble with bleeding after this procedure, treated with hemostasis coagulator.  He was on multiple antibiotics as an outpatient including Bactrim, Cephalosporium, Cipro, and Macrobid.  He presented with severe sepsis on 2017 was admitted to the ICU.  He has improved since then.  He will require 6 weeks of antibiotics and a PICC line placed after his blood cultures have been negative.  Testing for HIV, influenza, neisseria  gonorrhea, rapid strep, and Zika virus have all been negative.  Spoke to Dr. Parrish, who feels he will need reevaluation of his mitral valve as an outpatient.    Review of Systems:     All systems were reviewed and negative except for:  Constitution:  positive for fatigue    Objective     Vital Signs:    Temp:  [97.7 °F (36.5 °C)-98.5 °F (36.9 °C)] 97.7 °F (36.5 °C)  Heart Rate:  [58-66] 58  Resp:  [16-18] 16  BP: (119-125)/(71-80) 124/71    Physical Exam:      General Appearance:    Alert, cooperative, in no acute distress   Head:    Normocephalic, without obvious abnormality, atraumatic   Eyes:            Lids and lashes normal, conjunctivae and sclerae normal, no   icterus, no pallor, corneas clear, PERRLA   Ears:    Ears appear intact with no abnormalities noted   Throat:   No oral lesions, no thrush, oral mucosa moist   Neck:   No adenopathy, supple, trachea midline, no thyromegaly, no   carotid bruit, no JVD   Back:     No kyphosis present, no scoliosis present, no skin lesions,      erythema or scars, no tenderness to percussion or                   palpation,   range of motion normal   Lungs:     Clear to auscultation,respirations regular, even and                  unlabored    Heart:    regular rate and rhythm with 2/6 systolic ejection murmur.     Chest Wall:    No abnormalities observed   Abdomen:     Normal bowel sounds, no masses, no organomegaly, soft        non-tender, non-distended, no guarding, no rebound                tenderness   Rectal:     Deferred   Extremities:   Moves all extremities well, no edema, no cyanosis, no             redness, some joint swelling in the right ankle.     Pulses:   Pulses palpable and equal bilaterally   Skin:   No bleeding, bruising or rash   Lymph nodes:   No palpable adenopathy   Neurologic:   Cranial nerves 2 - 12 grossly intact, sensation intact, DTR       present and equal bilaterally        Results Review:      I reviewed the patient's new clinical  results.    Labs:    Lab Results (last 24 hours)     Procedure Component Value Units Date/Time    POC Glucose Fingerstick [[id number]]  (Normal) Collected:  04/23/17 1618    Specimen:  Blood Updated:  04/23/17 1646     Glucose 114 mg/dL       Serial Number: [id number]    : [id number]       POC Glucose Fingerstick [[id number]]  (Normal) Collected:  04/23/17 1938    Specimen:  Blood Updated:  04/23/17 2100     Glucose 120 mg/dL       Serial Number: [id number]    : [id number]       Blood Culture With MCKAYLA [[id number]]  (Abnormal) Collected:  04/22/17 1101    Specimen:  Blood from Hand, Left Updated:  04/24/17 0014     Blood Culture Abnormal Stain (A)     Gram Stain Result Anaerobic Bottle Gram positive cocci in clusters    Blood Culture With MCKAYLA [[id number]]  (Normal) Collected:  04/21/17 0359    Specimen:  Blood from Hand, Right Updated:  04/24/17 0501     Blood Culture No growth at 3 days    Blood Culture With MCKAYLA [[id number]]  (Abnormal)  (Susceptibility) Collected:  04/19/17 0517    Specimen:  Blood from Hand, Right Updated:  04/24/17 0557     Blood Culture Abnormal Stain (A)      Staphylococcus aureus, MRSA (C)      ARMANDO to follow.    Consider infectious disease consult to rule out distant focus of infection.  Methicillin resistant Staphylococcus aureus, Patient may be an isolation risk.        Isolated from Anaerobic Bottle     Gram Stain Result Anaerobic Bottle Gram positive cocci in clusters    Susceptibility      Staphylococcus aureus, MRSA     ARMANDO     Amoxicillin + Clavulanate <=4/2 ug/ml Resistant     Ampicillin >8 ug/ml Resistant     Ampicillin + Sulbactam <=8/4 ug/ml Resistant     Cefazolin 8 ug/ml Resistant     Ciprofloxacin >2 ug/ml Resistant     Clindamycin <=0.5 ug/ml Susceptible     Daptomycin <=0.5 ug/ml Susceptible     Erythromycin >4 ug/ml Resistant     Gentamicin <=4 ug/ml Susceptible     Levofloxacin >4 ug/ml Resistant     Linezolid 2 ug/ml Susceptible     Moxifloxacin 2 ug/ml Resistant     Oxacillin  >2 ug/ml Resistant     Penicillin G >8 ug/ml Resistant     Quinupristin + Dalfopristin <=1 ug/ml Susceptible     Rifampin <=1 ug/ml Susceptible     Tetracycline <=4 ug/ml Susceptible     Trimethoprim + Sulfamethoxazole <=0.5/9.5 ug/ml Susceptible     Vancomycin 2 ug/ml Susceptible                    POC Glucose Fingerstick [[id number]]  (Normal) Collected:  04/24/17 0622    Specimen:  Blood Updated:  04/24/17 0640     Glucose 118 mg/dL       Serial Number: [id number]    : [id number]       CBC & Differential [[id number]] Collected:  04/24/17 0553    Specimen:  Blood Updated:  04/24/17 0712    Narrative:       The following orders were created for panel order CBC & Differential.  Procedure                               Abnormality         Status                     ---------                               -----------         ------                     CBC Auto Differential[[id number]]         Abnormal            Final result                 Please view results for these tests on the individual orders.    CBC Auto Differential [[id number]]  (Abnormal) Collected:  04/24/17 0553    Specimen:  Blood Updated:  04/24/17 0712     WBC 12.34 (H) 10*3/mm3      RBC 4.77 10*6/mm3      Hemoglobin 14.3 g/dL      Hematocrit 44.2 %      MCV 92.7 fL      MCH 30.0 pg      MCHC 32.4 g/dL      RDW 13.7 %      RDW-SD 47.1 fl      MPV 10.4 fL      Platelets 435 (H) 10*3/mm3      Neutrophil % 79.8 %      Lymphocyte % 9.8 (L) %      Monocyte % 7.9 %      Eosinophil % 1.2 %      Basophil % 0.2 %      Immature Grans % 1.1 (H) %      Neutrophils, Absolute 9.85 (H) 10*3/mm3      Lymphocytes, Absolute 1.21 10*3/mm3      Monocytes, Absolute 0.97 (H) 10*3/mm3      Eosinophils, Absolute 0.15 10*3/mm3      Basophils, Absolute 0.03 10*3/mm3      Immature Grans, Absolute 0.13 (H) 10*3/mm3      nRBC 0.0 /100 WBC     Comprehensive Metabolic Panel [[id number]]  (Abnormal) Collected:  04/24/17 0553    Specimen:  Blood Updated:  04/24/17 0721     Glucose 116 (H)  mg/dL      BUN 13 mg/dL      Creatinine 0.70 mg/dL      Sodium 138 mmol/L      Potassium 3.5 mmol/L      Chloride 99 mmol/L      CO2 28.0 mmol/L      Calcium 8.1 (L) mg/dL      Total Protein 6.2 (L) g/dL      Albumin 2.80 (L) g/dL      ALT (SGPT) 57 U/L      AST (SGOT) 33 U/L      Alkaline Phosphatase 119 U/L      Total Bilirubin 1.0 mg/dL      eGFR Non African Amer 118 mL/min/1.73      Globulin 3.4 gm/dL      A/G Ratio 0.8 (L) g/dL      BUN/Creatinine Ratio 18.6     Anion Gap 14.5 mmol/L     Narrative:       Abnormal estimated GFR should be followed by more specific studies to confirm end stage chronic renal disease. The equation used for calculation may not be accurate for patients less than 19 years old, greater than 70 years old, patients at extremes of weight, malnutrition, or with acute renal dysfunction.    Magnesium [[id number]]  (Normal) Collected:  04/24/17 0553    Specimen:  Blood Updated:  04/24/17 0721     Magnesium 2.0 mg/dL     CK [[id number]]  (Abnormal) Collected:  04/24/17 0553    Specimen:  Blood Updated:  04/24/17 0728     Creatine Kinase <20 (L) U/L     Narrative:       The relative CKMB index is statistically unreliable if the CK is < or equal to 40 U/L.    Blood Culture With MCKAYLA [[id number]]  (Abnormal) Collected:  04/21/17 0352    Specimen:  Blood from Hand, Right Updated:  04/24/17 0945     Blood Culture Abnormal Stain (A)      Staphylococcus aureus, MRSA (C)        Methicillin resistant Staphylococcus aureus, Patient may be an isolation risk.        Isolated from Aerobic and Anaerobic Bottles     Gram Stain Result Anaerobic Bottle Gram positive cocci in clusters      Aerobic Bottle Gram positive cocci in clusters    Narrative:       For sensitivity see # [id number]    Blood Culture With MCKAYLA [[id number]]  (Normal) Collected:  04/22/17 1057    Specimen:  Blood from Arm, Left Updated:  04/24/17 1201     Blood Culture No growth at 2 days           Radiology:    Imaging Results (last 24 hours)     **  No results found for the last 24 hours. **          Medication Review:       aspirin 324 mg Oral Daily   brimonidine-timolol 1 drop Both Eyes Q12H   buPROPion  mg Oral Q12H   ceftaroline 600 mg Intravenous Q12H   DAPTOmycin (CUBICIN)   mg Intravenous Q24H   diazePAM 5 mg Oral Q12H   enoxaparin 40 mg Subcutaneous Daily   famotidine 20 mg Oral BID   fluconazole 200 mg Oral Q24H   insulin aspart 0-7 Units Subcutaneous 4x Daily AC & at Bedtime   metoprolol succinate XL 50 mg Oral Q24H   Morphine 30 mg Oral Q12H   nystatin 5 mL Oral 4x Daily   predniSONE 10 mg Oral Daily With Breakfast   rifAMPin 300 mg Oral Q12H   tamsulosin 0.4 mg Oral Nightly         Pharmacy Consult - Pharmacy to dose        Assessment/Plan     Problem List Items Addressed This Visit     RESOLVED: Sepsis - Primary    Relevant Medications    ciprofloxacin (CIPRO) 500 MG tablet      Other Visit Diagnoses     Pneumonia due to infectious organism, unspecified laterality, unspecified part of lung        Acute renal failure, unspecified acute renal failure type              1.  Disseminated MRSA infection, with mitral valve endocarditis  2.  Recurrent pyogenic infections, bronchopulmonary infections, sinusitis, and UTI.  3.  MRSA arthropathy  4.  Sepsis now resolved  5.  Acute renal insufficiency resolved  6.  Elevated LFTs, now resolved    Plan:    We'll continue with daptomycin, Ceftaroline, and rifampin.  Check lab work in the a.m.  We'll check blood cultures in the next few days.  One blood cultures are negative, patient could have PICC line placed and then home antibiotics arranged.  We'll reduce prednisone to 10 mg daily.  Continue other medications.  Further recommendations will depend on the clinical course.    Benton Lacy DO  04/24/17  12:30 PM

## 2017-04-24 NOTE — PLAN OF CARE
Problem: Patient Care Overview (Adult)  Goal: Plan of Care Review  Outcome: Ongoing (interventions implemented as appropriate)    04/24/17 3481   Coping/Psychosocial Response Interventions   Plan Of Care Reviewed With patient;spouse   Patient Care Overview   Progress improving   Outcome Evaluation   Outcome Summary/Follow up Plan PT PAIN CONTROLLED, IV ABX CHANGED, AMBULATED IN HALLWAY WITH PT, PT STATES HE IS FEELING BETTER, BC POSTIVE

## 2017-04-25 ENCOUNTER — TELEPHONE (OUTPATIENT)
Dept: INTERNAL MEDICINE | Facility: CLINIC | Age: 57
End: 2017-04-25

## 2017-04-25 LAB
ALBUMIN SERPL-MCNC: 2.9 G/DL (ref 3.5–5)
ANION GAP SERPL CALCULATED.3IONS-SCNC: 12.7 MMOL/L
BASOPHILS # BLD AUTO: 0.05 10*3/MM3 (ref 0–0.2)
BASOPHILS NFR BLD AUTO: 0.4 % (ref 0–2.5)
BUN BLD-MCNC: 12 MG/DL (ref 7–20)
BUN/CREAT SERPL: 17.1 (ref 6.3–21.9)
CALCIUM SPEC-SCNC: 8.2 MG/DL (ref 8.4–10.2)
CHLORIDE SERPL-SCNC: 101 MMOL/L (ref 98–107)
CO2 SERPL-SCNC: 27 MMOL/L (ref 26–30)
CREAT BLD-MCNC: 0.7 MG/DL (ref 0.6–1.3)
DEPRECATED RDW RBC AUTO: 46.3 FL (ref 37–54)
EOSINOPHIL # BLD AUTO: 0.15 10*3/MM3 (ref 0–0.7)
EOSINOPHIL NFR BLD AUTO: 1.3 % (ref 0–7)
ERYTHROCYTE [DISTWIDTH] IN BLOOD BY AUTOMATED COUNT: 13.7 % (ref 11.5–14.5)
GFR SERPL CREATININE-BSD FRML MDRD: 118 ML/MIN/1.73
GLUCOSE BLD-MCNC: 123 MG/DL (ref 74–98)
GLUCOSE BLDC GLUCOMTR-MCNC: 105 MG/DL (ref 70–130)
GLUCOSE BLDC GLUCOMTR-MCNC: 121 MG/DL (ref 70–130)
GLUCOSE BLDC GLUCOMTR-MCNC: 132 MG/DL (ref 70–130)
GLUCOSE BLDC GLUCOMTR-MCNC: 166 MG/DL (ref 70–130)
HCT VFR BLD AUTO: 42 % (ref 42–52)
HGB BLD-MCNC: 14 G/DL (ref 14–18)
IMM GRANULOCYTES # BLD: 0.15 10*3/MM3 (ref 0–0.06)
IMM GRANULOCYTES NFR BLD: 1.3 % (ref 0–0.6)
LYMPHOCYTES # BLD AUTO: 1.31 10*3/MM3 (ref 0.6–3.4)
LYMPHOCYTES NFR BLD AUTO: 11.6 % (ref 10–50)
MAGNESIUM SERPL-MCNC: 2.1 MG/DL (ref 1.6–2.3)
MCH RBC QN AUTO: 30.6 PG (ref 27–31)
MCHC RBC AUTO-ENTMCNC: 33.3 G/DL (ref 30–37)
MCV RBC AUTO: 91.7 FL (ref 80–94)
MONOCYTES # BLD AUTO: 0.92 10*3/MM3 (ref 0–0.9)
MONOCYTES NFR BLD AUTO: 8.1 % (ref 0–12)
NEUTROPHILS # BLD AUTO: 8.76 10*3/MM3 (ref 2–6.9)
NEUTROPHILS NFR BLD AUTO: 77.3 % (ref 37–80)
NRBC BLD MANUAL-RTO: 0 /100 WBC (ref 0–0)
PHOSPHATE SERPL-MCNC: 3.6 MG/DL (ref 2.5–4.5)
PLATELET # BLD AUTO: 468 10*3/MM3 (ref 130–400)
PMV BLD AUTO: 10.1 FL (ref 6–12)
POTASSIUM BLD-SCNC: 3.7 MMOL/L (ref 3.5–5.1)
RBC # BLD AUTO: 4.58 10*6/MM3 (ref 4.7–6.1)
SODIUM BLD-SCNC: 137 MMOL/L (ref 137–145)
WBC NRBC COR # BLD: 11.34 10*3/MM3 (ref 4.8–10.8)

## 2017-04-25 PROCEDURE — 82962 GLUCOSE BLOOD TEST: CPT

## 2017-04-25 PROCEDURE — 87040 BLOOD CULTURE FOR BACTERIA: CPT | Performed by: INTERNAL MEDICINE

## 2017-04-25 PROCEDURE — 99232 SBSQ HOSP IP/OBS MODERATE 35: CPT | Performed by: INTERNAL MEDICINE

## 2017-04-25 PROCEDURE — 94799 UNLISTED PULMONARY SVC/PX: CPT

## 2017-04-25 PROCEDURE — 83735 ASSAY OF MAGNESIUM: CPT | Performed by: INTERNAL MEDICINE

## 2017-04-25 PROCEDURE — 85025 COMPLETE CBC W/AUTO DIFF WBC: CPT | Performed by: INTERNAL MEDICINE

## 2017-04-25 PROCEDURE — 25010000002 ONDANSETRON PER 1 MG: Performed by: HOSPITALIST

## 2017-04-25 PROCEDURE — 80069 RENAL FUNCTION PANEL: CPT | Performed by: INTERNAL MEDICINE

## 2017-04-25 PROCEDURE — 25010000002 ENOXAPARIN PER 10 MG: Performed by: FAMILY MEDICINE

## 2017-04-25 PROCEDURE — 63710000001 PREDNISONE PER 5 MG: Performed by: INTERNAL MEDICINE

## 2017-04-25 PROCEDURE — 25010000002 DAPTOMYCIN PER 1 MG: Performed by: INTERNAL MEDICINE

## 2017-04-25 PROCEDURE — 25010000002 HYDROMORPHONE PER 4 MG: Performed by: HOSPITALIST

## 2017-04-25 RX ADMIN — TAMSULOSIN HYDROCHLORIDE 0.4 MG: 0.4 CAPSULE ORAL at 20:14

## 2017-04-25 RX ADMIN — MORPHINE SULFATE 30 MG: 30 TABLET, EXTENDED RELEASE ORAL at 20:14

## 2017-04-25 RX ADMIN — RIFAMPIN 300 MG: 300 CAPSULE ORAL at 09:26

## 2017-04-25 RX ADMIN — ONDANSETRON 4 MG: 2 INJECTION INTRAMUSCULAR; INTRAVENOUS at 09:20

## 2017-04-25 RX ADMIN — HYDROMORPHONE HYDROCHLORIDE 0.5 MG: 1 INJECTION, SOLUTION INTRAMUSCULAR; INTRAVENOUS; SUBCUTANEOUS at 00:01

## 2017-04-25 RX ADMIN — ENOXAPARIN SODIUM 40 MG: 40 INJECTION SUBCUTANEOUS at 09:27

## 2017-04-25 RX ADMIN — DAPTOMYCIN 800 MG: 500 INJECTION, POWDER, LYOPHILIZED, FOR SOLUTION INTRAVENOUS at 14:57

## 2017-04-25 RX ADMIN — OXYCODONE HYDROCHLORIDE AND ACETAMINOPHEN 1 TABLET: 5; 325 TABLET ORAL at 00:01

## 2017-04-25 RX ADMIN — OXYCODONE HYDROCHLORIDE AND ACETAMINOPHEN 1 TABLET: 5; 325 TABLET ORAL at 20:14

## 2017-04-25 RX ADMIN — RIFAMPIN 300 MG: 300 CAPSULE ORAL at 20:14

## 2017-04-25 RX ADMIN — DIAZEPAM 5 MG: 5 TABLET ORAL at 20:14

## 2017-04-25 RX ADMIN — MORPHINE SULFATE 30 MG: 30 TABLET, EXTENDED RELEASE ORAL at 09:26

## 2017-04-25 RX ADMIN — CEFTAROLINE FOSAMIL 600 MG: 600 POWDER, FOR SOLUTION INTRAVENOUS at 20:13

## 2017-04-25 RX ADMIN — HYDROMORPHONE HYDROCHLORIDE 0.5 MG: 1 INJECTION, SOLUTION INTRAMUSCULAR; INTRAVENOUS; SUBCUTANEOUS at 22:23

## 2017-04-25 RX ADMIN — OXYCODONE HYDROCHLORIDE AND ACETAMINOPHEN 1 TABLET: 5; 325 TABLET ORAL at 06:08

## 2017-04-25 RX ADMIN — FAMOTIDINE 20 MG: 20 TABLET, FILM COATED ORAL at 09:26

## 2017-04-25 RX ADMIN — BRIMONIDINE TARTRATE, TIMOLOL MALEATE 1 DROP: 2; 5 SOLUTION/ DROPS OPHTHALMIC at 09:34

## 2017-04-25 RX ADMIN — BRIMONIDINE TARTRATE, TIMOLOL MALEATE 1 DROP: 2; 5 SOLUTION/ DROPS OPHTHALMIC at 20:15

## 2017-04-25 RX ADMIN — NYSTATIN 500000 UNITS: 500000 SUSPENSION ORAL at 09:25

## 2017-04-25 RX ADMIN — ASPIRIN 81 MG 324 MG: 81 TABLET ORAL at 09:25

## 2017-04-25 RX ADMIN — BUPROPION HYDROCHLORIDE 150 MG: 150 TABLET, FILM COATED, EXTENDED RELEASE ORAL at 09:27

## 2017-04-25 RX ADMIN — PREDNISONE 10 MG: 10 TABLET ORAL at 09:26

## 2017-04-25 RX ADMIN — OXYCODONE HYDROCHLORIDE AND ACETAMINOPHEN 1 TABLET: 5; 325 TABLET ORAL at 14:57

## 2017-04-25 RX ADMIN — CEFTAROLINE FOSAMIL 600 MG: 600 POWDER, FOR SOLUTION INTRAVENOUS at 09:26

## 2017-04-25 RX ADMIN — HYDROMORPHONE HYDROCHLORIDE 0.5 MG: 1 INJECTION, SOLUTION INTRAMUSCULAR; INTRAVENOUS; SUBCUTANEOUS at 06:08

## 2017-04-25 RX ADMIN — NYSTATIN 500000 UNITS: 500000 SUSPENSION ORAL at 20:13

## 2017-04-25 RX ADMIN — NYSTATIN 500000 UNITS: 500000 SUSPENSION ORAL at 12:27

## 2017-04-25 RX ADMIN — DIAZEPAM 5 MG: 5 TABLET ORAL at 09:25

## 2017-04-25 RX ADMIN — METOPROLOL SUCCINATE 50 MG: 50 TABLET, EXTENDED RELEASE ORAL at 09:27

## 2017-04-25 RX ADMIN — BUPROPION HYDROCHLORIDE 150 MG: 150 TABLET, FILM COATED, EXTENDED RELEASE ORAL at 20:15

## 2017-04-25 NOTE — THERAPY DISCHARGE NOTE
Acute Care - Physical Therapy Initial Eval/Discharge  [facility]     Patient Name: Demi Meyer  : 1960  MRN: [id number]  Today's Date: 2017   Onset of Illness/Injury or Date of Surgery Date: 17  Date of Referral to PT: 17  Referring Physician: Camden Mcdonald MD      Admit Date: 2017    Visit Dx:    ICD-10-CM ICD-9-CM   1. Sepsis, due to unspecified organism A41.9 038.9     995.91   2. Pneumonia due to infectious organism, unspecified laterality, unspecified part of lung J18.9 136.9     484.8   3. Acute renal failure, unspecified acute renal failure type N17.9 584.9   4. Impaired functional mobility, balance, gait, and endurance Z74.09 V49.89     Patient Active Problem List   Diagnosis   • Gastroesophageal reflux disease   • Anxiety   • Primary osteoarthritis involving multiple joints   • Chronic back pain   • Depression   • Essential hypertension   • Hypogonadism in male   • Arthralgia of hip   • Pain of lower extremity   • Hypercholesterolemia   • Tobacco use   • Dyspnea   • Headache   • Hypertensive urgency   • UTI (urinary tract infection), bacterial   • Severe sepsis due to methicillin resistant Staphylococcus aureus (MRSA) with acute organ dysfunction   • Mild intermittent asthma without complication   • Abdominal pain, lower   • Shoulder pain   • Acute right ankle pain   • Acute gout of right ankle   • Acute maxillary sinusitis   • Acute kidney injury   • Dehydration with hyponatremia   • Elevated LFTs   • Elevated INR     Past Medical History:   Diagnosis Date   • Arthritis    • Asthma     Dr Butts   • Bronchiectasis    • Bronchitis, mucopurulent recurrent    • Diverticulitis    • Diverticulosis    • Gastric ulcer    • GERD (gastroesophageal reflux disease)    • Glaucoma    • Hypertension    • Lumbosacral disc disease    • Neurologic disorder    • Renal calculi    • Scoliosis    • Stroke     TIA - slurred speech, discoordinated     Past Surgical History:   Procedure  Laterality Date   • ABDOMINAL SURGERY     • APPENDECTOMY     • BACK SURGERY     • EYE SURGERY     • HERNIA REPAIR  2011    ventral & hiatal w/ mesh   • LUMBAR FUSION  1999    L3-S1   • TONSILLECTOMY            PT ASSESSMENT (last 72 hours)      PT Evaluation       04/25/17 0840 04/24/17 2000    Muscle Tone Assessment    Muscle Tone Assessment Bilateral Upper Extremities;Bilateral Lower Extremities  -MM Bilateral Upper Extremities;Bilateral Lower Extremities  -MT    Bilateral Upper Extremities Muscle Tone Assessment mildly decreased tone  -MM mildly decreased tone  -MT    Bilateral Lower Extremities Muscle Tone Assessment mildly decreased tone  -MM mildly decreased tone  -MT      04/24/17 1800 04/24/17 1600    Muscle Tone Assessment    Muscle Tone Assessment Bilateral Upper Extremities;Bilateral Lower Extremities  -TG Bilateral Upper Extremities;Bilateral Lower Extremities  -TG    Bilateral Upper Extremities Muscle Tone Assessment mildly decreased tone  -TG mildly decreased tone  -TG    Bilateral Lower Extremities Muscle Tone Assessment mildly decreased tone  -TG mildly decreased tone  -TG      04/24/17 1456 04/24/17 1200    Rehab Evaluation    Document Type evaluation  -JR     Subjective Information agree to therapy;complains of;pain  -JR     Patient Effort, Rehab Treatment good  -JR     Symptoms Noted During/After Treatment increased pain  -JR     Symptoms Noted Comment RW assist for decreased pain in RLE  -JR     General Information    Patient Profile Review yes  -JR     Onset of Illness/Injury or Date of Surgery Date 04/17/17  -JR     Referring Physician Camden Mcdonald MD  -JR     General Observations Supine with spouse at bedside  -JR     Pertinent History Of Current Problem MRSA, severe sepsis, RLE/foot pain  -JR     Prior Level of Function independent:;all household mobility  -JR     Equipment Currently Used at Home cane, straight  -JR     Plans/Goals Discussed With patient;spouse/S.O.;agreed upon  -JR      Risks Reviewed patient:;spouse/S.O.:;increased discomfort  -JR     Benefits Reviewed patient:;spouse/S.O.:;increase strength;increase independence;improve function  -JR     Barriers to Rehab none identified  -JR     Living Environment    Lives With spouse  -JR     Living Arrangements house  -JR     Home Accessibility no concerns  -JR     Clinical Impression    Date of Referral to PT 04/23/17  -JR     PT Diagnosis Gait abnormality  -JR     Patient/Family Goals Statement Patient wants to get home and return to independent functional level  -JR     Criteria for Skilled Therapeutic Interventions Met no problems identified which require skilled intervention  -JR     Pain Assessment    Pain Assessment 0-10  -JR     Pain Score 8  -JR     Post Pain Score 6  -JR     Pain Type Chronic pain  -JR     Pain Location Foot  -JR     Pain Orientation Right  -JR     Pain Intervention(s) Repositioned;Medication (See MAR);Ambulation/increased activity  -JR     Response to Interventions decreased to 6/10 with use of RW  -JR     Cognitive Assessment/Intervention    Current Cognitive/Communication Assessment functional  -JR     Orientation Status oriented x 4  -JR     Follows Commands/Answers Questions able to follow multi-step instructions  -JR     Personal Safety fully aware of deficits  -JR     ROM (Range of Motion)    General ROM no range of motion deficits identified  -JR     MMT (Manual Muscle Testing)    General MMT Assessment Detail Functionally at least 4/5  -JR     Muscle Tone Assessment    Muscle Tone Assessment  Bilateral Upper Extremities;Bilateral Lower Extremities  -TG    Bilateral Upper Extremities Muscle Tone Assessment  mildly decreased tone  -TG    Bilateral Lower Extremities Muscle Tone Assessment  mildly decreased tone  -TG    Bed Mobility, Assessment/Treatment    Bed Mob, Supine to Sit, [place] independent  -JR     Bed Mob, Sit to Supine, [place] independent  -JR     Transfer Assessment/Treatment     Transfers, Sit-Stand [place] conditional independence  -JR     Transfers, Stand-Sit [place] conditional independence  -JR     Transfers, Sit-Stand-Sit, Assist Device rolling walker  -JR     Gait Assessment/Treatment    Gait, [place] Level conditional independence  -JR     Gait, Assistive Device rolling walker  -JR     Gait, Distance (Feet) 158  -JR     Gait, Gait Pattern Analysis swing-to gait  -JR     Gait, Gait Deviations stride length decreased  -JR     Gait, Comment Improved with use of RW  -JR     Positioning and Restraints    Pre-Treatment Position in bed  -JR     Post Treatment Position chair  -JR     In Chair sitting;call light within reach;encouraged to call for assist;with family/caregiver  -JR       04/24/17 0800 04/24/17 0330    General Information    Equipment Currently Used at Home  cane, straight  -MT    Living Environment    Transportation Available  family or friend will provide;car  -MT    Muscle Tone Assessment    Muscle Tone Assessment Bilateral Upper Extremities;Bilateral Lower Extremities  -TG     Bilateral Upper Extremities Muscle Tone Assessment mildly decreased tone  -TG     Bilateral Lower Extremities Muscle Tone Assessment mildly decreased tone  -TG       04/23/17 2000 04/23/17 0444    General Information    Equipment Currently Used at Home  cane, straight  -ET    Living Environment    Transportation Available  car;family or friend will provide  -ET    Pain Assessment    Pain Score 8  -MT       User Key  (r) = Recorded By, (t) = Taken By, (c) = Cosigned By    Initials Name Provider Type    JR Janett Zuniga, PT Physical Therapist    TG Shirley Gonzales RN Registered Nurse    TA Sandoval, RN Registered Nurse    FLOR Esparza, RN Registered Nurse    Terence Sena RN Registered Nurse              PT Recommendation and Plan  PT Frequency: evaluation only                 Outcome Measures       04/24/17 5748          How much help from another person do you  currently need...    Turning from your back to your side while in flat bed without using bedrails? 4  -JR      Moving from lying on back to sitting on the side of a flat bed without bedrails? 4  -JR      Moving to and from a bed to a chair (including a wheelchair)? 4  -JR      Standing up from a chair using your arms (e.g., wheelchair, bedside chair)? 4  -JR      Climbing 3-5 steps with a railing? 3  -JR      To walk in hospital room? 4  -JR      AM-PAC 6 Clicks Score 23  -JR      Functional Assessment    Outcome Measure Options AM-PAC 6 Clicks Basic Mobility (PT)  -JR        User Key  (r) = Recorded By, (t) = Taken By, (c) = Cosigned By    Initials Name Provider Type    JR Janett Zuniga PT Physical Therapist           Time Calculation:       Therapy Charges for Today     Code Description Service Date Service Provider Modifiers Qty    [id number]  PT EVAL LOW COMPLEXITY 4 4/24/2017 Janett Zuniga PT GP 1          PT G-Codes  Outcome Measure Options: AM-PAC 6 Clicks Basic Mobility (PT)    PT Discharge Summary  Reason for Discharge: At baseline function    Janett Zuniga PT  4/25/2017

## 2017-04-25 NOTE — NURSING NOTE
LACE teaching for endocarditis/ sepsis instructed pt on what they are, causes, signs and symptoms and treatment with teachback

## 2017-04-25 NOTE — NURSING NOTE
Pt transferred from 3rd floor.  Pt lying in bed c/o moderate pain in legs and shoulders.  Vital signs stable.  No s/sx of distress noted.

## 2017-04-25 NOTE — PLAN OF CARE
Problem: Patient Care Overview (Adult)  Goal: Plan of Care Review  Outcome: Ongoing (interventions implemented as appropriate)    04/25/17 7528   Coping/Psychosocial Response Interventions   Plan Of Care Reviewed With patient;spouse       Goal: Adult Individualization and Mutuality  Outcome: Ongoing (interventions implemented as appropriate)  Goal: Discharge Needs Assessment  Outcome: Ongoing (interventions implemented as appropriate)    Problem: Pain, Acute (Adult)  Goal: Identify Related Risk Factors and Signs and Symptoms  Outcome: Ongoing (interventions implemented as appropriate)  Goal: Acceptable Pain Control/Comfort Level  Outcome: Ongoing (interventions implemented as appropriate)    Problem: NPPV/CPAP (Adult)  Goal: Signs and Symptoms of Listed Potential Problems Will be Absent or Manageable (NPPV/CPAP)  Outcome: Ongoing (interventions implemented as appropriate)

## 2017-04-25 NOTE — PLAN OF CARE
Problem: Patient Care Overview (Adult)  Goal: Plan of Care Review  Outcome: Ongoing (interventions implemented as appropriate)    04/25/17 1356   Coping/Psychosocial Response Interventions   Plan Of Care Reviewed With patient   Patient Care Overview   Progress improving         Problem: NPPV/CPAP (Adult)  Goal: Signs and Symptoms of Listed Potential Problems Will be Absent or Manageable (NPPV/CPAP)  Outcome: Ongoing (interventions implemented as appropriate)    04/25/17 1356   NPPV/CPAP   Problems Assessed (NPPV/CPAP) skin breakdown;hypoxia/hypoxemia   Problems Present (NPPV/CPAP) none

## 2017-04-25 NOTE — PROGRESS NOTES
[facility]    Name:  Demi Meyer   Age:  56 y.o.  Sex:  male  :  1960  MRN:  [id number]   Visit Number:  [id number]  Admission Date:  2017  Date Of Service:  17  Primary Care Physician:  Angelita Lu MD     LOS: 7 days :  Patient Care Team:  Angelita Lu MD as PCP - General:    History taken from:     patient    Chief Complaint:      Pain in right ankle  Subjective     Interval History:     Patient seen today. Patient is a 56-year-old male who presented with severe sepsis. He had a MRSA UTI, as well as diffuse joint pain in his left shoulder, right ankle, and right shoulder. FANG was done by Dr. Parrish which showed vegetation on mitral valve. Blood cultures of come positive for MRSA. This has been persistent. Dr. Coyne from infectious disease has been consulted. Patient has been on vancomycin, ceftaroline and rifampin most recently. Patient was changed from vancomycin to daptomycin by infectious disease this morning. Most recent blood cultures 3 days ago were positive for MRSA again.   He did have a history of recent recurrent purulent infection specifically an infection on his right neck. He claims that in March he had a right cervical skin abscess, treated with Bactrim and incision and drainage. He also had trouble with bleeding after this procedure, treated with hemostasis coagulator. He was on multiple antibiotics as an outpatient including Bactrim, Cephalosporium, Cipro, and Macrobid. He presented with severe sepsis on 2017 was admitted to the ICU. He has improved since then. He will require 6 weeks of antibiotics and a PICC line placed after his blood cultures have been negative. Testing for HIV, influenza, neisseria gonorrhea, rapid strep, and Zika virus have all been negative.  This was communicated to the patient.    Today he claims to have right ankle pain.  This is a little worse than yesterday.  He did ambulate quite a bit.   Also did reduce his prednisone.  His vancomycin was discontinued and he was started on daptomycin.  We'll continue to monitor this closely.  He denies any chest pressure, shortness breath, nausea, vomiting, or diarrhea.    Review of Systems:     All systems were reviewed and negative except for:  Constitution:  positive for fatigue    Objective     Vital Signs:    Temp:  [98 °F (36.7 °C)-98.7 °F (37.1 °C)] 98.3 °F (36.8 °C)  Heart Rate:  [57-66] 62  Resp:  [16-18] 18  BP: (116-134)/(53-82) 116/53    Physical Exam:      General Appearance:    Alert, cooperative, in no acute distress   Head:    Normocephalic, without obvious abnormality, atraumatic   Eyes:            Lids and lashes normal, conjunctivae and sclerae normal, no   icterus, no pallor, corneas clear, PERRLA   Ears:    Ears appear intact with no abnormalities noted   Throat:   No oral lesions, no thrush, oral mucosa moist   Neck:   No adenopathy, supple, trachea midline, no thyromegaly, no   carotid bruit, no JVD   Back:     No kyphosis present, no scoliosis present, no skin lesions,      erythema or scars, no tenderness to percussion or                   palpation,   range of motion normal   Lungs:     Clear to auscultation,respirations regular, even and                  unlabored    Heart:    Regular rhythm and normal rate, normal S1 and S2, no            murmur, no gallop, no rub, no click   Chest Wall:    No abnormalities observed   Abdomen:     Normal bowel sounds, no masses, no organomegaly, soft        non-tender, non-distended, no guarding, no rebound                tenderness   Rectal:     Deferred   Extremities:   Moves all extremities well, no edema, no cyanosis, no             rednessMild right ankle swelling.     Pulses:   Pulses palpable and equal bilaterally   Skin:   No bleeding, bruising or rash   Lymph nodes:   No palpable adenopathy   Neurologic:   Cranial nerves 2 - 12 grossly intact, sensation intact, DTR       present and equal  bilaterally        Results Review:      I reviewed the patient's new clinical results.    Labs:    Lab Results (last 24 hours)     Procedure Component Value Units Date/Time    Blood Culture With MCKAYLA [[id number]]  (Normal) Collected:  04/22/17 1057    Specimen:  Blood from Arm, Left Updated:  04/24/17 1201     Blood Culture No growth at 2 days    POC Glucose Fingerstick [[id number]]  (Normal) Collected:  04/24/17 1614    Specimen:  Blood Updated:  04/24/17 1620     Glucose 126 mg/dL       Serial Number: [id number]    : [id number]       POC Glucose Fingerstick [[id number]]  (Abnormal) Collected:  04/24/17 1930    Specimen:  Blood Updated:  04/24/17 1950     Glucose 139 (H) mg/dL       Serial Number: [id number]    : [id number]       CBC & Differential [[id number]] Collected:  04/25/17 0547    Specimen:  Blood Updated:  04/25/17 0558    Narrative:       The following orders were created for panel order CBC & Differential.  Procedure                               Abnormality         Status                     ---------                               -----------         ------                     CBC Auto Differential[[id number]]         Abnormal            Final result                 Please view results for these tests on the individual orders.    CBC Auto Differential [[id number]]  (Abnormal) Collected:  04/25/17 0547    Specimen:  Blood Updated:  04/25/17 0558     WBC 11.34 (H) 10*3/mm3      RBC 4.58 (L) 10*6/mm3      Hemoglobin 14.0 g/dL      Hematocrit 42.0 %      MCV 91.7 fL      MCH 30.6 pg      MCHC 33.3 g/dL      RDW 13.7 %      RDW-SD 46.3 fl      MPV 10.1 fL      Platelets 468 (H) 10*3/mm3      Neutrophil % 77.3 %      Lymphocyte % 11.6 %      Monocyte % 8.1 %      Eosinophil % 1.3 %      Basophil % 0.4 %      Immature Grans % 1.3 (H) %      Neutrophils, Absolute 8.76 (H) 10*3/mm3      Lymphocytes, Absolute 1.31 10*3/mm3      Monocytes, Absolute 0.92 (H) 10*3/mm3      Eosinophils, Absolute 0.15 10*3/mm3       Basophils, Absolute 0.05 10*3/mm3      Immature Grans, Absolute 0.15 (H) 10*3/mm3      nRBC 0.0 /100 WBC     POC Glucose Fingerstick [[id number]]  (Normal) Collected:  04/25/17 0559    Specimen:  Blood Updated:  04/25/17 0610     Glucose 121 mg/dL       Serial Number: [id number]    : [id number]       Magnesium [[id number]]  (Normal) Collected:  04/25/17 0547    Specimen:  Blood Updated:  04/25/17 0625     Magnesium 2.1 mg/dL     Renal Function Panel [[id number]]  (Abnormal) Collected:  04/25/17 0547    Specimen:  Blood Updated:  04/25/17 0639     Glucose 123 (H) mg/dL      BUN 12 mg/dL      Creatinine 0.70 mg/dL      Sodium 137 mmol/L      Potassium 3.7 mmol/L      Chloride 101 mmol/L      CO2 27.0 mmol/L      Calcium 8.2 (L) mg/dL      Albumin 2.90 (L) g/dL      Phosphorus 3.6 mg/dL      Anion Gap 12.7 mmol/L      BUN/Creatinine Ratio 17.1     eGFR Non African Amer 118 mL/min/1.73     Narrative:       Abnormal estimated GFR should be followed by more specific studies to confirm end stage chronic renal disease. The equation used for calculation may not be accurate for patients less than 19 years old, greater than 70 years old, patients at extremes of weight, malnutrition, or with acute renal dysfunction.    Blood Culture With MCKAYLA [[id number]]  (Abnormal) Collected:  04/22/17 1101    Specimen:  Blood from Hand, Left Updated:  04/25/17 0743     Blood Culture Abnormal Stain (A)      Staphylococcus aureus (A)        Consider infectious disease consult to rule out distant focus of infection.        Isolated from Anaerobic Bottle     Gram Stain Result Anaerobic Bottle Gram positive cocci in clusters    Blood Culture With MCKAYLA [[id number]]  (Abnormal) Collected:  04/21/17 0359    Specimen:  Blood from Hand, Right Updated:  04/25/17 0918     Blood Culture Abnormal Stain (A)     Gram Stain Result Aerobic Bottle Gram positive cocci in clusters      Anaerobic Bottle Gram positive cocci in clusters    Blood Culture [[id number]]  Collected:  04/25/17 0937    Specimen:  Blood from Arm, Left Updated:  04/25/17 0943           Radiology:    Imaging Results (last 24 hours)     ** No results found for the last 24 hours. **          Medication Review:       aspirin 324 mg Oral Daily   brimonidine-timolol 1 drop Both Eyes Q12H   buPROPion  mg Oral Q12H   ceftaroline 600 mg Intravenous Q12H   DAPTOmycin (CUBICIN)   mg Intravenous Q24H   diazePAM 5 mg Oral Q12H   enoxaparin 40 mg Subcutaneous Daily   famotidine 20 mg Oral BID   fluconazole 200 mg Oral Q24H   insulin aspart 0-7 Units Subcutaneous 4x Daily AC & at Bedtime   metoprolol succinate XL 50 mg Oral Q24H   Morphine 30 mg Oral Q12H   nystatin 5 mL Oral 4x Daily   predniSONE 10 mg Oral Daily With Breakfast   rifAMPin 300 mg Oral Q12H   tamsulosin 0.4 mg Oral Nightly         Pharmacy Consult - Pharmacy to dose        Assessment/Plan     Problem List Items Addressed This Visit     RESOLVED: Sepsis - Primary    Relevant Medications    ciprofloxacin (CIPRO) 500 MG tablet      Other Visit Diagnoses     Pneumonia due to infectious organism, unspecified laterality, unspecified part of lung        Acute renal failure, unspecified acute renal failure type              1. Disseminated MRSA infection, with mitral valve endocarditis  2. Recurrent pyogenic infections, bronchopulmonary infections, sinusitis, and UTI.  3. MRSA arthropathy  4. Sepsis now resolved  5. Acute renal insufficiency resolved  6. Elevated LFTs, now resolved    Plan:    We'll continue with current medication regimen including daptomycin, Ceftaroline and rifampin.  Check CBC with differential in the a.m.  Blood cultures were checked this morning.  Continue other medications.  Slowly reduce prednisone.  Once cultures are negative he will have a PICC line placed and outpatient antibiotics will be continued for 6 weeks total.  Further recommendations will depend on the clinical course.    Benton Lacy,   04/25/17  10:33  AM

## 2017-04-25 NOTE — PROGRESS NOTES
[facility] Progress Note    Admission Date: 2017    Demi Meyer  1960  [id number]    Date: 2017    Meds:    IV Anti-Infectives     Ordered     Dose/Rate Route Frequency Start Stop    17 1545  DAPTOmycin (CUBICIN) 800 mg in sodium chloride 0.9 % 50 mL IVPB     Ordering Provider:  Carlos Enrique Coyne MD    800 mg  100 mL/hr over 30 Minutes Intravenous Every 24 Hours 17 1400      17 1622  fluconazole (DIFLUCAN) tablet 200 mg     Ordering Provider:  Camden Mcdonald MD    200 mg Oral Every 24 Hours 17 1700 17 1659    17 1830  rifAMPin (RIFADIN) capsule 300 mg     Ordering Provider:  Camden Mcdonald MD    300 mg Oral Every 12 Hours Scheduled 17 2100      17 181  ceftaroline (TEFLARO) 600 mg/100 mL 0.9% NS (mbp)     Ordering Provider:  Camden Mcdonald MD    600 mg Intravenous Every 12 Hours 17 2000      17  levoFLOXacin (LEVAQUIN) 500 mg/100 mL D5W (premix) (LEVAQUIN) 500 mg     Ordering Provider:  Moises Meyer MD    500 mg Intravenous Once 17  cefepime (MAXIPIME) 2 g in sodium chloride 0.9 % 100 mL IVPB     Ordering Provider:  Moises Meyer MD    2 g Intravenous Once 17 1833  cefTRIAXone (ROCEPHIN) 1 g/50 mL 0.9% NS VTB (mbp)     Ordering Provider:  Moises Meyer MD    1 g  over 30 Minutes Intravenous Once 17 1833  vancomycin 1000 mg in sodium chloride 0.9% 250 mL IVPB     Ordering Provider:  Moises Meyer MD    1,000 mg Intravenous Once 17          CC:  Patient seen and examined at bedside. Patient has been afebrile. C/O b/l LE swelling when walking.    SUBJECTIVE:  B/L shoulder pain  ROS:  No f/c/s. No n/v/d. No rash. No new ADR to Abx.     PE:   Vital Signs  Temp (24hrs), Av.1 °F (36.7 °C), Min:97.7 °F (36.5 °C), Max:98.4 °F (36.9 °C)    Temp  Min: 97.7 °F (36.5 °C)   Max: 98.4 °F (36.9 °C)  BP  Min: 121/79  Max: 134/82  Pulse  Min: 58  Max: 66  Resp  Min: 16  Max: 18  SpO2  Min: 90 %  Max: 94 %    GENERAL: Awake and alert, in no acute distress.   HEENT:  No conjunctival injection. No icterus. Oropharynx clear without evidence of thrush or exudate.  NECK: Supple, no JVD     HEART: RRR; No murmur, rubs, gallops.   LUNGS: Clear to auscultation bilaterally without wheezing, rales, rhonchi. Normal respiratory effort. Nonlabored. No dullness.  ABDOMEN: Soft, nontender, nondistended. Positive bowel sounds. No rebound or guarding. NO mass or HSM.  EXT:  No cyanosis, clubbing or edema. + Janeway lesion right great toe  : Genitalia generally unremarkable.  Without Yuen catheter.  SKIN: Warm and dry, Janeway lesion on right great toe.    NEURO: Alert and oriented x 3, Motor 5/5 bilaterally    Laboratory Data      Results from last 7 days  Lab Units 04/24/17  0553 04/22/17  0507 04/21/17  0352   WBC 10*3/mm3 12.34* 14.37* 17.95*   HEMOGLOBIN g/dL 14.3 13.6* 12.6*   HEMATOCRIT % 44.2 41.9* 38.0*   PLATELETS 10*3/mm3 435* 304 245       Results from last 7 days  Lab Units 04/24/17  0553   SODIUM mmol/L 138   POTASSIUM mmol/L 3.5   CHLORIDE mmol/L 99   TOTAL CO2 mmol/L 28.0   BUN mg/dL 13   CREATININE mg/dL 0.70   GLUCOSE mg/dL 116*   CALCIUM mg/dL 8.1*       Results from last 7 days  Lab Units 04/24/17  0553   ALK PHOS U/L 119   BILIRUBIN mg/dL 1.0   ALT (SGPT) U/L 57   AST (SGOT) U/L 33       Results from last 7 days  Lab Units 04/18/17  1622   SED RATE mm/hr 64*       Results from last 7 days  Lab Units 04/22/17  1056   CRP mg/dL 21.60*       Results from last 7 days  Lab Units 04/19/17  0517   LACTATE mmol/L 1.5       Results from last 7 days  Lab Units 04/24/17  0553 04/18/17  1837   CK TOTAL U/L <20* 72       Results from last 7 days  Lab Units 04/23/17  1148 04/21/17  0352   VANCOMYCIN TR mcg/mL 8.62 <5.00*     Estimated Creatinine Clearance: 145.7 mL/min (by C-G formula based on Cr  of 0.7).    Microbiology:  Blood Culture   Date Value Ref Range Status   04/22/2017 Abnormal Stain (A)  Preliminary              MRSA SCREEN CX   Date Value Ref Range Status   04/18/2017   Final    No Methicillin Resistant Staphylococcus aureus isolated                Radiology:  Imaging Results (last 72 hours)     ** No results found for the last 72 hours. **          I personally read the radiographic studies     IMPRESSION:  MRSA infective endocarditis  Complicated MRSA bacteremia    RECOMMENDATIONS;  57 yo M admitted with MRSA infected endocarditis, complicated MRSA bacteremia. Blood cultures from 4/22 still positive. Antibiotic therapy was changed to Daptomycin today (Vancomycin levels sub therapeutic despite maximal Vancomycin dose).     - Will continue Daptomycin at current dose  - Would continue Rifampin in view of presence of hardware and persistent bacteremia  - Will repeat blood cultures  - Anticipate a 6-8 week course of IV antibiotic therapy  - Will f/u!    Carlos Enrique Coyne MD  4/24/2017  8:44 PM

## 2017-04-26 ENCOUNTER — APPOINTMENT (OUTPATIENT)
Dept: GENERAL RADIOLOGY | Facility: HOSPITAL | Age: 57
End: 2017-04-26

## 2017-04-26 VITALS
DIASTOLIC BLOOD PRESSURE: 50 MMHG | RESPIRATION RATE: 18 BRPM | HEIGHT: 70 IN | BODY MASS INDEX: 32.64 KG/M2 | OXYGEN SATURATION: 96 % | HEART RATE: 57 BPM | WEIGHT: 228 LBS | SYSTOLIC BLOOD PRESSURE: 101 MMHG | TEMPERATURE: 98.2 F

## 2017-04-26 LAB
BACTERIA SPEC AEROBE CULT: ABNORMAL
BACTERIA SPEC AEROBE CULT: ABNORMAL
BASOPHILS # BLD AUTO: 0.04 10*3/MM3 (ref 0–0.2)
BASOPHILS NFR BLD AUTO: 0.4 % (ref 0–2.5)
BILIRUB UR QL STRIP: NEGATIVE
CK SERPL-CCNC: 35 U/L (ref 30–170)
CLARITY UR: CLEAR
COLOR UR: YELLOW
DEPRECATED RDW RBC AUTO: 46.4 FL (ref 37–54)
EOSINOPHIL # BLD AUTO: 0.13 10*3/MM3 (ref 0–0.7)
EOSINOPHIL NFR BLD AUTO: 1.3 % (ref 0–7)
ERYTHROCYTE [DISTWIDTH] IN BLOOD BY AUTOMATED COUNT: 13.6 % (ref 11.5–14.5)
GLUCOSE BLDC GLUCOMTR-MCNC: 114 MG/DL (ref 70–130)
GLUCOSE BLDC GLUCOMTR-MCNC: 120 MG/DL (ref 70–130)
GLUCOSE UR STRIP-MCNC: NEGATIVE MG/DL
GRAM STN SPEC: ABNORMAL
GRAM STN SPEC: ABNORMAL
HCT VFR BLD AUTO: 40.5 % (ref 42–52)
HGB BLD-MCNC: 13.3 G/DL (ref 14–18)
HGB UR QL STRIP.AUTO: NEGATIVE
IMM GRANULOCYTES # BLD: 0.18 10*3/MM3 (ref 0–0.06)
IMM GRANULOCYTES NFR BLD: 1.8 % (ref 0–0.6)
KETONES UR QL STRIP: NEGATIVE
LEUKOCYTE ESTERASE UR QL STRIP.AUTO: NEGATIVE
LYMPHOCYTES # BLD AUTO: 1.55 10*3/MM3 (ref 0.6–3.4)
LYMPHOCYTES NFR BLD AUTO: 15.5 % (ref 10–50)
MCH RBC QN AUTO: 30 PG (ref 27–31)
MCHC RBC AUTO-ENTMCNC: 32.8 G/DL (ref 30–37)
MCV RBC AUTO: 91.4 FL (ref 80–94)
MONOCYTES # BLD AUTO: 0.83 10*3/MM3 (ref 0–0.9)
MONOCYTES NFR BLD AUTO: 8.3 % (ref 0–12)
NEUTROPHILS # BLD AUTO: 7.27 10*3/MM3 (ref 2–6.9)
NEUTROPHILS NFR BLD AUTO: 72.7 % (ref 37–80)
NITRITE UR QL STRIP: NEGATIVE
NRBC BLD MANUAL-RTO: 0 /100 WBC (ref 0–0)
PH UR STRIP.AUTO: 6 [PH] (ref 5–8)
PLATELET # BLD AUTO: 498 10*3/MM3 (ref 130–400)
PMV BLD AUTO: 10.2 FL (ref 6–12)
PROT UR QL STRIP: NEGATIVE
RBC # BLD AUTO: 4.43 10*6/MM3 (ref 4.7–6.1)
SP GR UR STRIP: 1.01 (ref 1–1.03)
UROBILINOGEN UR QL STRIP: NORMAL
WBC NRBC COR # BLD: 10 10*3/MM3 (ref 4.8–10.8)

## 2017-04-26 PROCEDURE — 82550 ASSAY OF CK (CPK): CPT | Performed by: INTERNAL MEDICINE

## 2017-04-26 PROCEDURE — 63710000001 PREDNISONE PER 5 MG: Performed by: INTERNAL MEDICINE

## 2017-04-26 PROCEDURE — 25010000002 ONDANSETRON PER 1 MG: Performed by: HOSPITALIST

## 2017-04-26 PROCEDURE — 99238 HOSP IP/OBS DSCHRG MGMT 30/<: CPT | Performed by: INTERNAL MEDICINE

## 2017-04-26 PROCEDURE — 82962 GLUCOSE BLOOD TEST: CPT

## 2017-04-26 PROCEDURE — C1751 CATH, INF, PER/CENT/MIDLINE: HCPCS

## 2017-04-26 PROCEDURE — 81003 URINALYSIS AUTO W/O SCOPE: CPT | Performed by: INTERNAL MEDICINE

## 2017-04-26 PROCEDURE — 25010000002 DAPTOMYCIN PER 1 MG: Performed by: INTERNAL MEDICINE

## 2017-04-26 PROCEDURE — 85025 COMPLETE CBC W/AUTO DIFF WBC: CPT | Performed by: INTERNAL MEDICINE

## 2017-04-26 PROCEDURE — 71010 HC CHEST PA OR AP: CPT

## 2017-04-26 PROCEDURE — 25010000002 HYDROMORPHONE PER 4 MG: Performed by: HOSPITALIST

## 2017-04-26 PROCEDURE — C1894 INTRO/SHEATH, NON-LASER: HCPCS

## 2017-04-26 PROCEDURE — 25010000002 ENOXAPARIN PER 10 MG: Performed by: FAMILY MEDICINE

## 2017-04-26 PROCEDURE — 02HV33Z INSERTION OF INFUSION DEVICE INTO SUPERIOR VENA CAVA, PERCUTANEOUS APPROACH: ICD-10-PCS | Performed by: INTERNAL MEDICINE

## 2017-04-26 PROCEDURE — 94799 UNLISTED PULMONARY SVC/PX: CPT

## 2017-04-26 RX ORDER — METOPROLOL SUCCINATE 50 MG/1
50 TABLET, EXTENDED RELEASE ORAL
Qty: 30 TABLET | Refills: 0 | Status: SHIPPED | OUTPATIENT
Start: 2017-04-26 | End: 2017-05-01 | Stop reason: SDUPTHER

## 2017-04-26 RX ORDER — SODIUM CHLORIDE 0.9 % (FLUSH) 0.9 %
1-10 SYRINGE (ML) INJECTION AS NEEDED
Status: DISCONTINUED | OUTPATIENT
Start: 2017-04-26 | End: 2017-04-26 | Stop reason: HOSPADM

## 2017-04-26 RX ORDER — OXYCODONE HYDROCHLORIDE AND ACETAMINOPHEN 5; 325 MG/1; MG/1
1 TABLET ORAL EVERY 6 HOURS PRN
Qty: 15 TABLET | Refills: 0 | Status: SHIPPED | OUTPATIENT
Start: 2017-04-26 | End: 2017-05-01

## 2017-04-26 RX ORDER — MORPHINE SULFATE 30 MG/1
30 TABLET, FILM COATED, EXTENDED RELEASE ORAL EVERY 12 HOURS SCHEDULED
Qty: 10 TABLET | Refills: 0 | Status: SHIPPED | OUTPATIENT
Start: 2017-04-26 | End: 2017-04-30

## 2017-04-26 RX ORDER — PREDNISONE 10 MG/1
10 TABLET ORAL
Qty: 10 TABLET | Refills: 0 | Status: SHIPPED | OUTPATIENT
Start: 2017-04-26 | End: 2017-05-16 | Stop reason: HOSPADM

## 2017-04-26 RX ORDER — DIAZEPAM 5 MG/1
5 TABLET ORAL EVERY 12 HOURS
Qty: 6 TABLET | Refills: 0 | Status: SHIPPED | OUTPATIENT
Start: 2017-04-26 | End: 2017-06-06

## 2017-04-26 RX ADMIN — ONDANSETRON 4 MG: 2 INJECTION INTRAMUSCULAR; INTRAVENOUS at 05:37

## 2017-04-26 RX ADMIN — ENOXAPARIN SODIUM 40 MG: 40 INJECTION SUBCUTANEOUS at 08:14

## 2017-04-26 RX ADMIN — PREDNISONE 10 MG: 10 TABLET ORAL at 08:15

## 2017-04-26 RX ADMIN — NYSTATIN 500000 UNITS: 500000 SUSPENSION ORAL at 11:42

## 2017-04-26 RX ADMIN — ASPIRIN 81 MG 324 MG: 81 TABLET ORAL at 08:15

## 2017-04-26 RX ADMIN — RIFAMPIN 300 MG: 300 CAPSULE ORAL at 08:15

## 2017-04-26 RX ADMIN — DIAZEPAM 5 MG: 5 TABLET ORAL at 08:18

## 2017-04-26 RX ADMIN — BRIMONIDINE TARTRATE, TIMOLOL MALEATE 1 DROP: 2; 5 SOLUTION/ DROPS OPHTHALMIC at 08:17

## 2017-04-26 RX ADMIN — NYSTATIN 500000 UNITS: 500000 SUSPENSION ORAL at 08:24

## 2017-04-26 RX ADMIN — METOPROLOL SUCCINATE 50 MG: 50 TABLET, EXTENDED RELEASE ORAL at 08:16

## 2017-04-26 RX ADMIN — MORPHINE SULFATE 30 MG: 30 TABLET, EXTENDED RELEASE ORAL at 08:15

## 2017-04-26 RX ADMIN — CEFTAROLINE FOSAMIL 600 MG: 600 POWDER, FOR SOLUTION INTRAVENOUS at 08:14

## 2017-04-26 RX ADMIN — DAPTOMYCIN 800 MG: 500 INJECTION, POWDER, LYOPHILIZED, FOR SOLUTION INTRAVENOUS at 14:28

## 2017-04-26 RX ADMIN — BUPROPION HYDROCHLORIDE 150 MG: 150 TABLET, FILM COATED, EXTENDED RELEASE ORAL at 08:16

## 2017-04-26 RX ADMIN — FAMOTIDINE 20 MG: 20 TABLET, FILM COATED ORAL at 09:37

## 2017-04-26 RX ADMIN — OXYCODONE HYDROCHLORIDE AND ACETAMINOPHEN 1 TABLET: 5; 325 TABLET ORAL at 01:07

## 2017-04-26 RX ADMIN — HYDROMORPHONE HYDROCHLORIDE 0.5 MG: 1 INJECTION, SOLUTION INTRAMUSCULAR; INTRAVENOUS; SUBCUTANEOUS at 05:47

## 2017-04-26 RX ADMIN — DIAZEPAM 5 MG: 5 TABLET ORAL at 08:15

## 2017-04-26 NOTE — NURSING NOTE
Chest X-ray noted picc line to be located in the SVC. Notified picc line nurse - DAYAN Seo. Notified picc okay to use. Patient is being discharged.

## 2017-04-26 NOTE — CONSULTS
SHIVAE PICC inserted by JOZEF Parish rn vabc  CXR needed for verification.  Please page [facility] PICC rn with resuts

## 2017-04-26 NOTE — PLAN OF CARE
Problem: Pain, Acute (Adult)  Goal: Identify Related Risk Factors and Signs and Symptoms  Outcome: Ongoing (interventions implemented as appropriate)    04/25/17 1984   Pain, Acute   Related Risk Factors (Acute Pain) infection;disease process   Signs and Symptoms (Acute Pain) guarding/abnormal posturing/positioning;verbalization of pain descriptors       Goal: Acceptable Pain Control/Comfort Level  Outcome: Ongoing (interventions implemented as appropriate)    04/25/17 0342   Pain, Acute (Adult)   Acceptable Pain Control/Comfort Level making progress toward outcome

## 2017-04-26 NOTE — PROGRESS NOTES
Continued Stay Note  ZAC Nagel     Patient Name: Demi Meyer  MRN: [id number]  Today's Date: 4/26/2017    Admit Date: 4/18/2017          Discharge Plan       04/26/17 1244    Case Management/Social Work Plan    Plan Received call from [facility] spoke with Mani who reported pt's co-pay will be $500.  Explained that is what is remaining toward his deductible at this time which may also be used on his hospitalization instead.  Reported basically pt will be paying $500 either toward hospital bill or infusion co.  Requested I explain this to pt and confirm pt agreeable.    Additional Comments Spoke with pt who verbalized understanding and was agreeable.  Will notify [facility] when PICC line insertion time determined as per their request.      04/26/17 1021    Case Management/Social Work Plan    Plan Informed per Dr Lacy this am planned PICC placement today with discharge later today.  CM notes indicate pt selected [facility] and [facility].  Confirmed with pt.  [facility] contacted   Will be able to accept.    Additional Comments [facility] contacted.  Clinicals, order and demographics faxed to [facility].              Discharge Codes     None        Expected Discharge Date and Time     Expected Discharge Date Expected Discharge Time    Apr 26, 2017             Cassandra Escalante

## 2017-04-26 NOTE — NURSING NOTE
LACE teaching for sepsis/ endocarditis pt able to give teachback regarding, management, prevention of infection and when to call the doctor or come to ER with what signs and symptoms

## 2017-04-26 NOTE — DISCHARGE SUMMARY
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 Fingerstick [[id number]]  (Abnormal) Collected:  04/20/17 0602    Specimen:  Blood Updated:  04/20/17 0605     Glucose 135 (H) mg/dL       Serial Number: [id number]    : [id number]       VRE Culture [[id number]]  (Normal) Collected:  04/18/17 2201    Specimen:  Swab from Per Rectum Updated:  04/20/17 0619     VRE SCREEN CX No Vancomycin Resistant Enterococcus Isolated    Magnesium [[id number]]  (Normal) Collected:  04/20/17 0701    Specimen:  Blood Updated:  04/20/17 0754     Magnesium 2.0 mg/dL     Phosphorus [[id number]]  (Normal) Collected:  04/20/17 0701    Specimen:  Blood Updated:  04/20/17 0754     Phosphorus 2.8 mg/dL     POC Glucose Fingerstick [[id number]]  (Abnormal) Collected:  04/20/17 0804    Specimen:  Blood Updated:  04/20/17 0810     Glucose 164 (H) mg/dL       Serial Number: [id number]    : [id number]       CBC & Differential [[id number]] Collected:  04/20/17 0701    Specimen:  Blood Updated:  04/20/17 0812    Narrative:       The following orders were created for panel order CBC & Differential.  Procedure                               Abnormality         Status                      ---------                               -----------         ------                     Manual Differential[[id number]]           Abnormal            Final result               CBC Auto Differential[[id number]]         Abnormal            Final result                 Please view results for these tests on the individual orders.    CBC Auto Differential [[id number]]  (Abnormal) Collected:  04/20/17 0701    Specimen:  Blood Updated:  04/20/17 0812     WBC 16.89 (H) 10*3/mm3      RBC 4.28 (L) 10*6/mm3      Hemoglobin 13.3 (L) g/dL      Hematocrit 39.4 (L) %      MCV 92.1 fL      MCH 31.1 (H) pg      MCHC 33.8 g/dL      RDW 14.2 %      RDW-SD 48.0 fl      MPV 10.5 fL      Platelets 208 10*3/mm3     Manual Differential [[id number]]  (Abnormal) Collected:  04/20/17 0701    Specimen:  Blood Updated:  04/20/17 0812     Neutrophil % 61.0 %      Lymphocyte % 6.0 (L) %      Monocyte % 5.0 %      Bands %  27.0 (H) %      Metamyelocyte % 1.0 (H) %      Neutrophils Absolute 14.86 (H) 10*3/mm3      Lymphocytes Absolute 1.01 10*3/mm3      Monocytes Absolute 0.84 10*3/mm3      RBC Morphology Normal     Toxic Granulation Slight/1+     Platelet Estimate Adequate    Comprehensive Metabolic Panel [[id number]]  (Abnormal) Collected:  04/20/17 0701    Specimen:  Blood Updated:  04/20/17 0815     Glucose 132 (H) mg/dL      BUN 13 mg/dL      Creatinine 0.80 mg/dL      Sodium 134 (L) mmol/L      Potassium 3.5 mmol/L      Chloride 97 (L) mmol/L      CO2 27.0 mmol/L      Calcium 7.4 (L) mg/dL      Total Protein 6.1 (L) g/dL      Albumin 2.90 (L) g/dL      ALT (SGPT) 104 (H) U/L      AST (SGOT) 107 (H) U/L      Alkaline Phosphatase 104 U/L      Total Bilirubin 1.0 mg/dL      eGFR Non African Amer 101 mL/min/1.73      Globulin 3.2 gm/dL      A/G Ratio 0.9 (L) g/dL      BUN/Creatinine Ratio 16.3     Anion Gap 13.5 mmol/L     Narrative:       Abnormal estimated GFR should be followed by more specific studies to confirm end stage chronic renal disease.  The equation used for calculation may not be accurate for patients less than 19 years old, greater than 70 years old, patients at extremes of weight, malnutrition, or with acute renal dysfunction.    Protime-INR [[id number]]  (Abnormal) Collected:  04/20/17 0717    Specimen:  Blood Updated:  04/20/17 0817     Protime 18.5 (H) Seconds      INR 1.69 (H)    POC Glucose Fingerstick [[id number]]  (Abnormal) Collected:  04/20/17 1028    Specimen:  Blood Updated:  04/20/17 1035     Glucose 151 (H) mg/dL       Serial Number: [id number]    : [id number]       HIV 1 / 2 (HIV-1 / 2) Antibody Differentiation [[id number]] Collected:  04/18/17 2259    Specimen:  Blood Updated:  04/20/17 1518     HIV-1 Abs-EIA Negative     HIV-2 Ab Negative     HIV 1/2 Final Interpretation Comment      Negative for HIV-1 and HIV-2 antibodies  Follow-up testing for HIV-1 RNA, test [id number] is recommended  on a new specimen.       Narrative:       Performed at:  [address]  [id number]  : Bogn Everett PhD, Phone:  [phone]    POC Glucose Fingerstick [[id number]]  (Abnormal) Collected:  04/20/17 1543    Specimen:  Blood Updated:  04/20/17 1545     Glucose 174 (H) mg/dL       Serial Number: [id number]    : [id number]       POC Glucose Fingerstick [[id number]]  (Abnormal) Collected:  04/20/17 2027    Specimen:  Blood Updated:  04/20/17 2035     Glucose 156 (H) mg/dL       Serial Number: [id number]    : [id number]       [facility] Spotted Fever, IgG [[id number]] Collected:  04/18/17 2259    Specimen:  Blood Updated:  04/20/17 2207     Spotted Fever Group IgG Negative    Narrative:       Performed at:  [facility]  [id number]  : Brent Meek MD, Phone:  [phone]    CBC & Differential [[id number]] Collected:  04/21/17 0352    Specimen:  Blood Updated:  04/21/17 4828    Narrative:       The following orders were created for panel order CBC &  Differential.  Procedure                               Abnormality         Status                     ---------                               -----------         ------                     CBC Auto Differential[[id number]]         Abnormal            Final result                 Please view results for these tests on the individual orders.    CBC Auto Differential [[id number]]  (Abnormal) Collected:  04/21/17 0352    Specimen:  Blood Updated:  04/21/17 0418     WBC 17.95 (H) 10*3/mm3      RBC 4.11 (L) 10*6/mm3      Hemoglobin 12.6 (L) g/dL      Hematocrit 38.0 (L) %      MCV 92.5 fL      MCH 30.7 pg      MCHC 33.2 g/dL      RDW 14.1 %      RDW-SD 48.1 fl      MPV 10.7 fL      Platelets 245 10*3/mm3      Neutrophil % 90.6 (H) %      Lymphocyte % 3.6 (L) %      Monocyte % 4.6 %      Eosinophil % 0.0 %      Basophil % 0.2 %      Immature Grans % 1.0 (H) %      Neutrophils, Absolute 16.26 (H) 10*3/mm3      Lymphocytes, Absolute 0.65 10*3/mm3      Monocytes, Absolute 0.83 10*3/mm3      Eosinophils, Absolute 0.00 10*3/mm3      Basophils, Absolute 0.03 10*3/mm3      Immature Grans, Absolute 0.18 (H) 10*3/mm3      nRBC 0.0 /100 WBC     Comprehensive Metabolic Panel [[id number]]  (Abnormal) Collected:  04/21/17 0352    Specimen:  Blood Updated:  04/21/17 0432     Glucose 177 (H) mg/dL      BUN 15 mg/dL      Creatinine 0.70 mg/dL      Sodium 136 (L) mmol/L      Potassium 4.0 mmol/L      Chloride 98 mmol/L      CO2 27.0 mmol/L      Calcium 7.7 (L) mg/dL      Total Protein 6.1 (L) g/dL      Albumin 2.80 (L) g/dL      ALT (SGPT) 101 (H) U/L      AST (SGOT) 87 (H) U/L      Alkaline Phosphatase 118 U/L      Total Bilirubin 1.8 (H) mg/dL      eGFR Non African Amer 118 mL/min/1.73      Globulin 3.3 gm/dL      A/G Ratio 0.8 (L) g/dL      BUN/Creatinine Ratio 21.4     Anion Gap 15.0 mmol/L     Narrative:       Abnormal estimated GFR should be followed by more specific studies to confirm end stage chronic renal disease. The equation used for  calculation may not be accurate for patients less than 19 years old, greater than 70 years old, patients at extremes of weight, malnutrition, or with acute renal dysfunction.    Vancomycin, Trough [[id number]]  (Abnormal) Collected:  04/21/17 0352    Specimen:  Blood Updated:  04/21/17 0437     Vancomycin Trough <5.00 (L) mcg/mL     POC Glucose Fingerstick [[id number]]  (Normal) Collected:  04/21/17 0554    Specimen:  Blood Updated:  04/21/17 0600     Glucose 130 mg/dL       Serial Number: [id number]    : [id number]       MRSA Screen Culture [[id number]]  (Normal) Collected:  04/18/17 2201    Specimen:  Swab from Nares Updated:  04/21/17 0635     MRSA SCREEN CX No Methicillin Resistant Staphylococcus aureus isolated    Acinetobacter Screen [[id number]]  (Normal) Collected:  04/18/17 2201    Specimen:  Swab from Axilla, Left Updated:  04/21/17 0636     ACINETOBACTER SCREEN CX No Acinetobacter isolated    Neisseria Gonorrhea, PCR [[id number]] Collected:  04/18/17 2034    Specimen:  Urine from Urine, Clean Catch Updated:  04/21/17 0715     Neisseria gonorrhoeae, MAURO Negative    Narrative:       Performed at:  [address] [place] [address]  [id number]  : Bong Everett PhD, Phone:  [phone]    Blood Culture [[id number]]  (Abnormal)  (Susceptibility) Collected:  04/18/17 1850    Specimen:  Blood from Arm, Right Updated:  04/21/17 0724     Blood Culture Abnormal Stain (A)      Staphylococcus aureus, MRSA (C)      ARMANDO to follow.    Consider infectious disease consult to rule out distant focus of infection.  Methicillin resistant Staphylococcus aureus, Patient may be an isolation risk.        Isolated from Aerobic and Anaerobic Bottles     Gram Stain Result Aerobic Bottle Gram positive cocci in clusters      Anaerobic Bottle Gram positive cocci in clusters    Susceptibility      Staphylococcus aureus, MRSA     ARMANDO     Amoxicillin + Clavulanate <=4/2 ug/ml Resistant     Ampicillin >8 ug/ml  Resistant     Ampicillin + Sulbactam <=8/4 ug/ml Resistant     Cefazolin 8 ug/ml Resistant     Ciprofloxacin >2 ug/ml Resistant     Clindamycin <=0.5 ug/ml Susceptible     Daptomycin <=0.5 ug/ml Susceptible     Erythromycin >4 ug/ml Resistant     Gentamicin <=4 ug/ml Susceptible     Levofloxacin >4 ug/ml Resistant     Linezolid 2 ug/ml Susceptible     Moxifloxacin 2 ug/ml Resistant     Oxacillin >2 ug/ml Resistant     Penicillin G >8 ug/ml Resistant     Quinupristin + Dalfopristin <=1 ug/ml Susceptible     Rifampin <=1 ug/ml Susceptible     Tetracycline <=4 ug/ml Susceptible     Trimethoprim + Sulfamethoxazole <=0.5/9.5 ug/ml Susceptible     Vancomycin 2 ug/ml Susceptible                    Blood Culture [[id number]]  (Abnormal)  (Susceptibility) Collected:  04/18/17 1900    Specimen:  Blood from Hand, Right Updated:  04/21/17 0782     Blood Culture Abnormal Stain (A)      Staphylococcus aureus, MRSA (C)      ARMANDO to follow.    Consider infectious disease consult to rule out distant focus of infection.  Methicillin resistant Staphylococcus aureus, Patient may be an isolation risk.        Isolated from Aerobic and Anaerobic Bottles     Gram Stain Result Aerobic Bottle Gram positive cocci in clusters      Anaerobic Bottle Gram positive cocci in clusters    Susceptibility      Staphylococcus aureus, MRSA     ARMANDO     Amoxicillin + Clavulanate <=4/2 ug/ml Resistant     Ampicillin >8 ug/ml Resistant     Ampicillin + Sulbactam <=8/4 ug/ml Resistant     Cefazolin 8 ug/ml Resistant     Ciprofloxacin >2 ug/ml Resistant     Clindamycin <=0.5 ug/ml Susceptible     Daptomycin <=0.5 ug/ml Susceptible     Erythromycin >4 ug/ml Resistant     Gentamicin <=4 ug/ml Susceptible     Levofloxacin >4 ug/ml Resistant     Linezolid 2 ug/ml Susceptible     Moxifloxacin 2 ug/ml Resistant     Oxacillin >2 ug/ml Resistant     Penicillin G >8 ug/ml Resistant     Quinupristin + Dalfopristin <=1 ug/ml Susceptible     Rifampin <=1 ug/ml Susceptible      Tetracycline <=4 ug/ml Susceptible     Trimethoprim + Sulfamethoxazole <=0.5/9.5 ug/ml Susceptible     Vancomycin 2 ug/ml Susceptible                    Blood Culture With MCKAYLA [[id number]]  (Abnormal)  (Susceptibility) Collected:  04/19/17 0510    Specimen:  Blood from Hand, Right Updated:  04/21/17 0727     Blood Culture Abnormal Stain (A)      Staphylococcus aureus, MRSA (C)      ARMANDO to follow.    Consider infectious disease consult to rule out distant focus of infection.  Methicillin resistant Staphylococcus aureus, Patient may be an isolation risk.        Isolated from Pediatric Bottle     Gram Stain Result Pediatric Bottle Gram positive cocci in clusters    Susceptibility      Staphylococcus aureus, MRSA     ARMANDO     Amoxicillin + Clavulanate <=4/2 ug/ml Resistant     Ampicillin >8 ug/ml Resistant     Ampicillin + Sulbactam <=8/4 ug/ml Resistant     Cefazolin 8 ug/ml Resistant     Ciprofloxacin >2 ug/ml Resistant     Clindamycin <=0.5 ug/ml Susceptible     Daptomycin <=0.5 ug/ml Susceptible     Erythromycin >4 ug/ml Resistant     Gentamicin <=4 ug/ml Susceptible     Levofloxacin >4 ug/ml Resistant     Linezolid 2 ug/ml Susceptible     Moxifloxacin 2 ug/ml Resistant     Oxacillin >2 ug/ml Resistant     Penicillin G >8 ug/ml Resistant     Quinupristin + Dalfopristin <=1 ug/ml Susceptible     Rifampin <=1 ug/ml Susceptible     Tetracycline <=4 ug/ml Susceptible     Trimethoprim + Sulfamethoxazole <=0.5/9.5 ug/ml Susceptible     Vancomycin 2 ug/ml Susceptible                    POC Glucose Fingerstick [[id number]]  (Abnormal) Collected:  04/21/17 1047    Specimen:  Blood Updated:  04/21/17 1050     Glucose 138 (H) mg/dL       Serial Number: [id number]    : [id number]       POC Glucose Fingerstick [[id number]]  (Abnormal) Collected:  04/21/17 1601    Specimen:  Blood Updated:  04/21/17 1606     Glucose 136 (H) mg/dL       Serial Number: [id number]    : [id number]       Zika Virus MAC-PARESH (EUA) [[id number]]  Collected:  04/18/17 2259    Specimen:  Blood Updated:  04/21/17 1621     Zika Virus MAC-PARESH (EUA) Negative      Zika virus IgM not detected.  Flavivirus IgM not detected.       Narrative:       Performed at:  [address] [place] [address]  [id number]  : Brent Meek MD, Phone:  [phone]    POC Glucose Fingerstick [[id number]]  (Normal) Collected:  04/21/17 2057    Specimen:  Blood Updated:  04/21/17 2121     Glucose 125 mg/dL       Serial Number: [id number]    : [id number]       Comprehensive Metabolic Panel [[id number]]  (Abnormal) Collected:  04/22/17 0507    Specimen:  Blood Updated:  04/22/17 0623     Glucose 104 (H) mg/dL      BUN 15 mg/dL      Creatinine 0.70 mg/dL      Sodium 141 mmol/L      Potassium 3.5 mmol/L      Chloride 100 mmol/L      CO2 28.0 mmol/L      Calcium 8.2 (L) mg/dL      Total Protein 6.2 (L) g/dL      Albumin 2.90 (L) g/dL      ALT (SGPT) 81 (H) U/L      AST (SGOT) 49 (H) U/L      Alkaline Phosphatase 122 U/L      Total Bilirubin 1.1 mg/dL      eGFR Non African Amer 118 mL/min/1.73      Globulin 3.3 gm/dL      A/G Ratio 0.9 (L) g/dL      BUN/Creatinine Ratio 21.4     Anion Gap 16.5 mmol/L     Narrative:       Abnormal estimated GFR should be followed by more specific studies to confirm end stage chronic renal disease. The equation used for calculation may not be accurate for patients less than 19 years old, greater than 70 years old, patients at extremes of weight, malnutrition, or with acute renal dysfunction.    POC Glucose Fingerstick [[id number]]  (Normal) Collected:  04/22/17 0622    Specimen:  Blood Updated:  04/22/17 0626     Glucose 99 mg/dL       Serial Number: [id number]    : [id number]       CBC & Differential [[id number]] Collected:  04/22/17 0507    Specimen:  Blood Updated:  04/22/17 0656    Narrative:       The following orders were created for panel order CBC & Differential.  Procedure                               Abnormality          Status                     ---------                               -----------         ------                     Manual Differential[[id number]]           Abnormal            Final result               CBC Auto Differential[[id number]]         Abnormal            Final result                 Please view results for these tests on the individual orders.    CBC Auto Differential [[id number]]  (Abnormal) Collected:  04/22/17 0507    Specimen:  Blood Updated:  04/22/17 0656     WBC 14.37 (H) 10*3/mm3      RBC 4.52 (L) 10*6/mm3      Hemoglobin 13.6 (L) g/dL      Hematocrit 41.9 (L) %      MCV 92.7 fL      MCH 30.1 pg      MCHC 32.5 g/dL      RDW 14.0 %      RDW-SD 48.3 fl      MPV 11.0 fL      Platelets 304 10*3/mm3     Manual Differential [[id number]]  (Abnormal) Collected:  04/22/17 0507    Specimen:  Blood Updated:  04/22/17 0656     Neutrophil % 81.0 (H) %      Lymphocyte % 8.0 (L) %      Monocyte % 3.0 %      Bands %  8.0 (H) %      Neutrophils Absolute 12.79 (H) 10*3/mm3      Lymphocytes Absolute 1.15 10*3/mm3      Monocytes Absolute 0.43 10*3/mm3      Anisocytosis Slight/1+     WBC Morphology Normal     Platelet Estimate Adequate    Beta Strep Culture, Throat [[id number]]  (Normal) Collected:  04/19/17 0730    Specimen:  Swab from Throat Updated:  04/22/17 0905     Throat Culture, Beta Strep No Group A Streptococcus Isolated    POC Glucose Fingerstick [[id number]]  (Normal) Collected:  04/22/17 1104    Specimen:  Blood Updated:  04/22/17 1111     Glucose 109 mg/dL       Serial Number: [id number]    : [id number]       C-reactive Protein [[id number]]  (Abnormal) Collected:  04/22/17 1056    Specimen:  Blood Updated:  04/22/17 1147     C-Reactive Protein 21.60 (H) mg/dL     POC Glucose Fingerstick [[id number]]  (Normal) Collected:  04/22/17 1616    Specimen:  Blood Updated:  04/22/17 1640     Glucose 112 mg/dL       Serial Number: [id number]    : [id number]       POC Glucose Fingerstick [[id number]]  (Normal)  Collected:  04/22/17 1948    Specimen:  Blood Updated:  04/22/17 2016     Glucose 100 mg/dL       Serial Number: [id number]    : [id number]       POC Glucose Fingerstick [[id number]]  (Normal) Collected:  04/23/17 0606    Specimen:  Blood Updated:  04/23/17 0625     Glucose 115 mg/dL       Serial Number: [id number]    : [id number]       POC Glucose Fingerstick [[id number]]  (Normal) Collected:  04/23/17 1112    Specimen:  Blood Updated:  04/23/17 1131     Glucose 116 mg/dL       Serial Number: [id number]    : [id number]       Vancomycin, Trough [[id number]]  (Normal) Collected:  04/23/17 1148    Specimen:  Blood Updated:  04/23/17 1228     Vancomycin Trough 8.62 mcg/mL     POC Glucose Fingerstick [[id number]]  (Normal) Collected:  04/23/17 1618    Specimen:  Blood Updated:  04/23/17 1646     Glucose 114 mg/dL       Serial Number: [id number]    : [id number]       POC Glucose Fingerstick [[id number]]  (Normal) Collected:  04/23/17 1938    Specimen:  Blood Updated:  04/23/17 2100     Glucose 120 mg/dL       Serial Number: [id number]    : [id number]       Blood Culture With MCKAYLA [[id number]]  (Abnormal)  (Susceptibility) Collected:  04/19/17 0517    Specimen:  Blood from Hand, Right Updated:  04/24/17 0557     Blood Culture Abnormal Stain (A)      Staphylococcus aureus, MRSA (C)      ARMANDO to follow.    Consider infectious disease consult to rule out distant focus of infection.  Methicillin resistant Staphylococcus aureus, Patient may be an isolation risk.        Isolated from Anaerobic Bottle     Gram Stain Result Anaerobic Bottle Gram positive cocci in clusters    Susceptibility      Staphylococcus aureus, MRSA     ARMANDO     Amoxicillin + Clavulanate <=4/2 ug/ml Resistant     Ampicillin >8 ug/ml Resistant     Ampicillin + Sulbactam <=8/4 ug/ml Resistant     Cefazolin 8 ug/ml Resistant     Ciprofloxacin >2 ug/ml Resistant     Clindamycin <=0.5 ug/ml Susceptible     Daptomycin <=0.5 ug/ml Susceptible     Erythromycin  >4 ug/ml Resistant     Gentamicin <=4 ug/ml Susceptible     Levofloxacin >4 ug/ml Resistant     Linezolid 2 ug/ml Susceptible     Moxifloxacin 2 ug/ml Resistant     Oxacillin >2 ug/ml Resistant     Penicillin G >8 ug/ml Resistant     Quinupristin + Dalfopristin <=1 ug/ml Susceptible     Rifampin <=1 ug/ml Susceptible     Tetracycline <=4 ug/ml Susceptible     Trimethoprim + Sulfamethoxazole <=0.5/9.5 ug/ml Susceptible     Vancomycin 2 ug/ml Susceptible                    POC Glucose Fingerstick [[id number]]  (Normal) Collected:  04/24/17 0622    Specimen:  Blood Updated:  04/24/17 0640     Glucose 118 mg/dL       Serial Number: [id number]    : [id number]       CBC & Differential [[id number]] Collected:  04/24/17 0553    Specimen:  Blood Updated:  04/24/17 0712    Narrative:       The following orders were created for panel order CBC & Differential.  Procedure                               Abnormality         Status                     ---------                               -----------         ------                     CBC Auto Differential[[id number]]         Abnormal            Final result                 Please view results for these tests on the individual orders.    CBC Auto Differential [[id number]]  (Abnormal) Collected:  04/24/17 0553    Specimen:  Blood Updated:  04/24/17 0712     WBC 12.34 (H) 10*3/mm3      RBC 4.77 10*6/mm3      Hemoglobin 14.3 g/dL      Hematocrit 44.2 %      MCV 92.7 fL      MCH 30.0 pg      MCHC 32.4 g/dL      RDW 13.7 %      RDW-SD 47.1 fl      MPV 10.4 fL      Platelets 435 (H) 10*3/mm3      Neutrophil % 79.8 %      Lymphocyte % 9.8 (L) %      Monocyte % 7.9 %      Eosinophil % 1.2 %      Basophil % 0.2 %      Immature Grans % 1.1 (H) %      Neutrophils, Absolute 9.85 (H) 10*3/mm3      Lymphocytes, Absolute 1.21 10*3/mm3      Monocytes, Absolute 0.97 (H) 10*3/mm3      Eosinophils, Absolute 0.15 10*3/mm3      Basophils, Absolute 0.03 10*3/mm3      Immature Grans, Absolute 0.13 (H)  10*3/mm3      nRBC 0.0 /100 WBC     Comprehensive Metabolic Panel [[id number]]  (Abnormal) Collected:  04/24/17 0553    Specimen:  Blood Updated:  04/24/17 0721     Glucose 116 (H) mg/dL      BUN 13 mg/dL      Creatinine 0.70 mg/dL      Sodium 138 mmol/L      Potassium 3.5 mmol/L      Chloride 99 mmol/L      CO2 28.0 mmol/L      Calcium 8.1 (L) mg/dL      Total Protein 6.2 (L) g/dL      Albumin 2.80 (L) g/dL      ALT (SGPT) 57 U/L      AST (SGOT) 33 U/L      Alkaline Phosphatase 119 U/L      Total Bilirubin 1.0 mg/dL      eGFR Non African Amer 118 mL/min/1.73      Globulin 3.4 gm/dL      A/G Ratio 0.8 (L) g/dL      BUN/Creatinine Ratio 18.6     Anion Gap 14.5 mmol/L     Narrative:       Abnormal estimated GFR should be followed by more specific studies to confirm end stage chronic renal disease. The equation used for calculation may not be accurate for patients less than 19 years old, greater than 70 years old, patients at extremes of weight, malnutrition, or with acute renal dysfunction.    Magnesium [[id number]]  (Normal) Collected:  04/24/17 0553    Specimen:  Blood Updated:  04/24/17 0721     Magnesium 2.0 mg/dL     CK [[id number]]  (Abnormal) Collected:  04/24/17 0553    Specimen:  Blood Updated:  04/24/17 0728     Creatine Kinase <20 (L) U/L     Narrative:       The relative CKMB index is statistically unreliable if the CK is < or equal to 40 U/L.    Blood Culture With MCKAYLA [[id number]]  (Abnormal) Collected:  04/21/17 0352    Specimen:  Blood from Hand, Right Updated:  04/24/17 0945     Blood Culture Abnormal Stain (A)      Staphylococcus aureus, MRSA (C)        Methicillin resistant Staphylococcus aureus, Patient may be an isolation risk.        Isolated from Aerobic and Anaerobic Bottles     Gram Stain Result Anaerobic Bottle Gram positive cocci in clusters      Aerobic Bottle Gram positive cocci in clusters    Narrative:       For sensitivity see # [id number]    POC Glucose Fingerstick [[id number]]  (Normal)  Collected:  04/24/17 1614    Specimen:  Blood Updated:  04/24/17 1620     Glucose 126 mg/dL       Serial Number: [id number]    : [id number]       POC Glucose Fingerstick [[id number]]  (Abnormal) Collected:  04/24/17 1930    Specimen:  Blood Updated:  04/24/17 1950     Glucose 139 (H) mg/dL       Serial Number: [id number]    : [id number]       CBC & Differential [[id number]] Collected:  04/25/17 0547    Specimen:  Blood Updated:  04/25/17 0558    Narrative:       The following orders were created for panel order CBC & Differential.  Procedure                               Abnormality         Status                     ---------                               -----------         ------                     CBC Auto Differential[[id number]]         Abnormal            Final result                 Please view results for these tests on the individual orders.    CBC Auto Differential [[id number]]  (Abnormal) Collected:  04/25/17 0547    Specimen:  Blood Updated:  04/25/17 0558     WBC 11.34 (H) 10*3/mm3      RBC 4.58 (L) 10*6/mm3      Hemoglobin 14.0 g/dL      Hematocrit 42.0 %      MCV 91.7 fL      MCH 30.6 pg      MCHC 33.3 g/dL      RDW 13.7 %      RDW-SD 46.3 fl      MPV 10.1 fL      Platelets 468 (H) 10*3/mm3      Neutrophil % 77.3 %      Lymphocyte % 11.6 %      Monocyte % 8.1 %      Eosinophil % 1.3 %      Basophil % 0.4 %      Immature Grans % 1.3 (H) %      Neutrophils, Absolute 8.76 (H) 10*3/mm3      Lymphocytes, Absolute 1.31 10*3/mm3      Monocytes, Absolute 0.92 (H) 10*3/mm3      Eosinophils, Absolute 0.15 10*3/mm3      Basophils, Absolute 0.05 10*3/mm3      Immature Grans, Absolute 0.15 (H) 10*3/mm3      nRBC 0.0 /100 WBC     POC Glucose Fingerstick [[id number]]  (Normal) Collected:  04/25/17 0559    Specimen:  Blood Updated:  04/25/17 0610     Glucose 121 mg/dL       Serial Number: [id number]    : [id number]       Magnesium [[id number]]  (Normal) Collected:  04/25/17 0547    Specimen:  Blood Updated:   04/25/17 0625     Magnesium 2.1 mg/dL     Renal Function Panel [[id number]]  (Abnormal) Collected:  04/25/17 0547    Specimen:  Blood Updated:  04/25/17 0639     Glucose 123 (H) mg/dL      BUN 12 mg/dL      Creatinine 0.70 mg/dL      Sodium 137 mmol/L      Potassium 3.7 mmol/L      Chloride 101 mmol/L      CO2 27.0 mmol/L      Calcium 8.2 (L) mg/dL      Albumin 2.90 (L) g/dL      Phosphorus 3.6 mg/dL      Anion Gap 12.7 mmol/L      BUN/Creatinine Ratio 17.1     eGFR Non African Amer 118 mL/min/1.73     Narrative:       Abnormal estimated GFR should be followed by more specific studies to confirm end stage chronic renal disease. The equation used for calculation may not be accurate for patients less than 19 years old, greater than 70 years old, patients at extremes of weight, malnutrition, or with acute renal dysfunction.    POC Glucose Fingerstick [[id number]]  (Abnormal) Collected:  04/25/17 1102    Specimen:  Blood Updated:  04/25/17 1149     Glucose 132 (H) mg/dL       Serial Number: [id number]    : [id number]       Blood Culture With MCKAYLA [[id number]]  (Normal) Collected:  04/22/17 1057    Specimen:  Blood from Arm, Left Updated:  04/25/17 1201     Blood Culture No growth at 3 days    POC Glucose Fingerstick [[id number]]  (Normal) Collected:  04/25/17 1556    Specimen:  Blood Updated:  04/25/17 1600     Glucose 105 mg/dL       Serial Number: [id number]    : [id number]       POC Glucose Fingerstick [[id number]]  (Abnormal) Collected:  04/25/17 2006    Specimen:  Blood Updated:  04/25/17 2010     Glucose 166 (H) mg/dL       Serial Number: [id number]    : [id number]       Blood Culture [[id number]]  (Normal) Collected:  04/25/17 0931    Specimen:  Blood from Arm, Left Updated:  04/25/17 2301     Blood Culture No growth at less than 24 hours    Blood Culture With MCKAYLA [[id number]]  (Abnormal) Collected:  04/21/17 0359    Specimen:  Blood from Hand, Right Updated:  04/26/17 0541     Blood Culture Abnormal Stain (A)       Staphylococcus aureus, MRSA (C)      No sensitivities will be performed on this sample, for sensitivities see sample # [id number].  Methicillin resistant Staphylococcus aureus, Patient may be an isolation risk.        Gram Stain Result Aerobic Bottle Gram positive cocci in clusters      Anaerobic Bottle Gram positive cocci in clusters    CBC & Differential [[id number]] Collected:  04/26/17 0451    Specimen:  Blood Updated:  04/26/17 0625    Narrative:       The following orders were created for panel order CBC & Differential.  Procedure                               Abnormality         Status                     ---------                               -----------         ------                     CBC Auto Differential[[id number]]         Abnormal            Final result                 Please view results for these tests on the individual orders.    CBC Auto Differential [[id number]]  (Abnormal) Collected:  04/26/17 0451    Specimen:  Blood Updated:  04/26/17 0625     WBC 10.00 10*3/mm3      RBC 4.43 (L) 10*6/mm3      Hemoglobin 13.3 (L) g/dL      Hematocrit 40.5 (L) %      MCV 91.4 fL      MCH 30.0 pg      MCHC 32.8 g/dL      RDW 13.6 %      RDW-SD 46.4 fl      MPV 10.2 fL      Platelets 498 (H) 10*3/mm3      Neutrophil % 72.7 %      Lymphocyte % 15.5 %      Monocyte % 8.3 %      Eosinophil % 1.3 %      Basophil % 0.4 %      Immature Grans % 1.8 (H) %      Neutrophils, Absolute 7.27 (H) 10*3/mm3      Lymphocytes, Absolute 1.55 10*3/mm3      Monocytes, Absolute 0.83 10*3/mm3      Eosinophils, Absolute 0.13 10*3/mm3      Basophils, Absolute 0.04 10*3/mm3      Immature Grans, Absolute 0.18 (H) 10*3/mm3      nRBC 0.0 /100 WBC     POC Glucose Fingerstick [[id number]]  (Normal) Collected:  04/26/17 0548    Specimen:  Blood Updated:  04/26/17 0655     Glucose 114 mg/dL       Serial Number: [id number]    : [id number]       CK [[id number]]  (Normal) Collected:  04/26/17 0451    Specimen:  Blood Updated:  04/26/17 0729      Creatine Kinase 35 U/L     Narrative:       The relative CKMB index is statistically unreliable if the CK is < or equal to 40 U/L.    Blood Culture With MCKAYLA [[id number]]  (Abnormal)  (Susceptibility) Collected:  04/22/17 1101    Specimen:  Blood from Hand, Left Updated:  04/26/17 0757     Blood Culture Abnormal Stain (A)      Staphylococcus aureus, MRSA (C)        Consider infectious disease consult to rule out distant focus of infection.  Methicillin resistant Staphylococcus aureus, Patient may be an isolation risk.        Isolated from Anaerobic Bottle     Gram Stain Result Anaerobic Bottle Gram positive cocci in clusters    Susceptibility      Staphylococcus aureus, MRSA     ARMANDO     Amoxicillin + Clavulanate <=4/2 ug/ml Resistant     Ampicillin >8 ug/ml Resistant     Ampicillin + Sulbactam <=8/4 ug/ml Resistant     Cefazolin <=4 ug/ml Resistant     Ciprofloxacin >2 ug/ml Resistant     Clindamycin <=0.5 ug/ml Susceptible     Daptomycin <=0.5 ug/ml Susceptible     Erythromycin >4 ug/ml Resistant     Gentamicin <=4 ug/ml Susceptible     Levofloxacin >4 ug/ml Resistant     Linezolid 4 ug/ml Susceptible     Moxifloxacin 2 ug/ml Resistant     Oxacillin >2 ug/ml Resistant     Penicillin G >8 ug/ml Resistant     Quinupristin + Dalfopristin <=1 ug/ml Susceptible     Rifampin <=1 ug/ml Susceptible     Tetracycline <=4 ug/ml Susceptible     Trimethoprim + Sulfamethoxazole <=0.5/9.5 ug/ml Susceptible     Vancomycin 2 ug/ml Susceptible                    Blood Culture [[id number]]  (Normal) Collected:  04/25/17 0937    Specimen:  Blood from Arm, Left Updated:  04/26/17 1001     Blood Culture No growth at 24 hours          Imaging Results (all)     Procedure Component Value Units Date/Time    CT Cervical Spine Without Contrast [[id number]] Collected:  04/18/17 2046     Updated:  04/18/17 2049    Narrative:       FINAL REPORT    TECHNIQUE:  Thin section axial images were obtained through the cervical spine without contrast.  Coronal  and sagittal reconstructed images were then provided.    CLINICAL HISTORY:  NECK PAIN, NO INJURY    FINDINGS:  There is normal alignment and curvature. There is no fracture. There are moderate changes of degenerative disc disease from C4-C7, but no significant posterior osteophytosis. There is no significant neuroforaminal narrowing at any level.      Impression:       Moderate degenerative changes in the lower cervical spine, otherwise no significant abnormality.    Authenticated by Jonnie Aleman M.D. on 04/18/2017 08:46:37 PM    CT Head Without Contrast [[id number]] Collected:  04/18/17 2047     Updated:  04/18/17 2049    Narrative:       FINAL REPORT    TECHNIQUE:  Routine axial images through the head were obtained without contrast.    CLINICAL HISTORY:  PAIN, NO INJURY    FINDINGS:  The ventricles are normal. There is no mass or other abnormal hypodensity. There is no shift of midline structures. There is no intracranial hemorrhage. There are postoperative changes in the left orbit and an air-fluid level in the right maxillary sinus   that may be due to acute on chronic sinusitis. The sinuses are otherwise clear and no acute osseous abnormality is seen.      Impression:       Possible acute on chronic sinusitis. No intracranial abnormality.    Authenticated by Jonnie Aleman M.D. on 04/18/2017 08:47:48 PM    CT Chest Without Contrast [[id number]] Collected:  04/18/17 2048     Updated:  04/18/17 2050    Narrative:       FINAL REPORT    TECHNIQUE:  Routine axial images were obtained from the lung apices to below the diaphragm without contrast.    CLINICAL HISTORY:  BILATERAL SHOULDER PAIN, CHEST PAIN, NO INJURY    FINDINGS:  Other than slight bibasilar atelectasis, the lungs are clear. The heart and vasculature are unremarkable. There is no pleural disease, adenopathy, or significant osseous abnormality.      Impression:       No significant abnormality.    Authenticated by Jonnie Aleman M.D. on 04/18/2017 08:48:33 PM    CT  Abdomen Pelvis Without Contrast [[id number]] Collected:  04/18/17 2119     Updated:  04/18/17 2121    Narrative:       FINAL REPORT    TECHNIQUE:  Axial images through the abdomen and pelvis were obtained by computed tomography.  IV contrast was withheld.    CLINICAL HISTORY:  RLQ PAIN    FINDINGS:  Abdomen:  The gallbladder, solid abdominal organs and ureters are normal. The GI tract is unremarkable. The appendix appears to have been removed. There is no evidence of appendicitis in any case.    Pelvis: The urinary bladder is unremarkable. There are postoperative and degenerative changes in the lumbar spine. There is no pelvic or abdominal ascites, adenopathy or acute osseous abnormality.      Impression:       No acute disease.    Authenticated by Jonnie Aleman M.D. on 04/18/2017 09:19:16 PM    XR Chest 1 View [[id number]] Collected:  04/19/17 0801     Updated:  04/19/17 0804    Narrative:       PROCEDURE: XR CHEST 1 VW-     HISTORY: hypoxia; A41.9-Sepsis, unspecified organism; J18.9-Pneumonia,  unspecified organism; N17.9-Acute kidney failure, unspecified     COMPARISON: None.     FINDINGS: The heart is normal in size. The mediastinum is unremarkable.  There are low lung volumes with bibasilar atelectasis. The lungs are  otherwise clear. There is no pneumothorax.  There are no acute osseous  abnormalities.           Impression:       Low lung volumes with bibasilar atelectasis.     Continued followup is recommended.     This report was finalized on 4/19/2017 8:02 AM by Anay Collazo M.D..    XR Chest 1 View [[id number]] Collected:  04/21/17 1138     Updated:  04/21/17 1141    Narrative:       PROCEDURE: XR CHEST 1 VW-     HISTORY: hypoxemia; A41.9-Sepsis, unspecified organism; J18.9-Pneumonia,  unspecified organism; N17.9-Acute kidney failure, unspecified     COMPARISON: April 19, 2017.     FINDINGS: The heart is normal in size. The mediastinum is unremarkable.  There are low lung volumes with bibasilar atelectasis  "which is  essentially unchanged. There is no pneumothorax.  There are no acute  osseous abnormalities.           Impression:       Low lung volumes with bibasilar atelectasis which is  essentially unchanged.     Continued followup is recommended.     This report was finalized on 4/21/2017 11:39 AM by Anay Collazo M.D..          Condition on Discharge:      Stable    Vital Signs:    /55 (BP Location: Left arm, Patient Position: Lying)  Pulse 60  Temp 98.7 °F (37.1 °C) (Oral)   Resp 20  Ht 70\" (177.8 cm)  Wt 228 lb (103 kg)  SpO2 91%  BMI 32.71 kg/m2    Physical Exam:      General Appearance:    Alert, cooperative, in no acute distress   Head:    Normocephalic, without obvious abnormality, atraumatic   Eyes:            Lids and lashes normal, conjunctivae and sclerae normal, no   icterus, no pallor, corneas clear, PERRLA   Ears:    Ears appear intact with no abnormalities noted   Throat:   No oral lesions, no thrush, oral mucosa moist   Neck:   No adenopathy, supple, trachea midline, no thyromegaly, no   carotid bruit, no JVD   Back:     No kyphosis present, no scoliosis present, no skin lesions,      erythema or scars, no tenderness to percussion or                   palpation,   range of motion normal   Lungs:     Clear to auscultation,respirations regular, even and                  unlabored    Heart:    Regular rhythm and normal rate, normal S1 and S2, no            murmur, no gallop, no rub, no click   Chest Wall:    No abnormalities observed   Abdomen:     Normal bowel sounds, no masses, no organomegaly, soft        non-tender, non-distended, no guarding, no rebound                tenderness   Rectal:     Deferred   Extremities:   4/5 strength in upper and lower extremities bilaterally.  Mild swelling and right ankle.     Pulses:   Pulses palpable and equal bilaterally   Skin:   No bleeding, bruising or rash   Lymph nodes:   No palpable adenopathy   Neurologic:   Cranial nerves 2 - 12 grossly " intact, sensation intact, DTR       present and equal bilaterally       Discharge Disposition:    Home-Health Care [facility]    Discharge Medication:     Rice, Demi   Home Medication Instructions JILLIAN:[id number]    Printed on:04/26/17 1041   Medication Information                      aspirin 325 MG tablet  Take 650 mg by mouth Daily As Needed for mild pain (1-3).             brimonidine-timolol (COMBIGAN) 0.2-0.5 % ophthalmic solution  2 drops 1 (one) time. 2 drops in left eye once per day             buPROPion SR (WELLBUTRIN SR) 150 MG 12 hr tablet  Take 1 tablet by mouth 3 (Three) Times a Day.             cetirizine (zyrTEC) 10 MG tablet  Take 10 mg by mouth Daily.             DAPTOmycin 800 mg in sodium chloride 0.9 % 50 mL  Infuse 800 mg into a venous catheter Daily for 40 days. Indications: Bacteria in the Blood, Endocarditis             diazePAM (VALIUM) 5 MG tablet  Take 1 tablet by mouth Every 12 (Twelve) Hours.             flunisolide (NASALIDE) 25 MCG/ACT (0.025%) solution nasal spray  Inhale 1 spray Every 12 (Twelve) Hours.             Fluticasone Furoate (ARNUITY ELLIPTA) 200 MCG/ACT aerosol powder   Inhale 1 puff Daily. Rinse mouth with water after use.             metoprolol succinate XL (TOPROL-XL) 50 MG 24 hr tablet  Take 1 tablet by mouth Daily.             Morphine (MS CONTIN) 30 MG 12 hr tablet  Take 1 tablet by mouth Every 12 (Twelve) Hours for 4 days.             ondansetron (ZOFRAN) 4 MG tablet  Take 1 tablet by mouth Every 6 (Six) Hours As Needed for Nausea or Vomiting.             oxyCODONE-acetaminophen (PERCOCET) 5-325 MG per tablet  Take 1 tablet by mouth Every 6 (Six) Hours As Needed for Severe Pain (7-10).             predniSONE (DELTASONE) 10 MG tablet  Take 1 tablet by mouth Daily With Breakfast. Take daily for 3 days, then take every other day for one week and then stop.             PROAIR  (90 BASE) MCG/ACT inhaler  Inhale 2 puffs Every 6 (Six) Hours As Needed for wheezing or  shortness of air.             tamsulosin (FLOMAX) 0.4 MG capsule 24 hr capsule  Take 1 capsule by mouth Every Night.             Testosterone Cypionate (DEPOTESTOTERONE CYPIONATE) 200 MG/ML injection  Inject 1 mL into the shoulder, thigh, or buttocks every 14 (fourteen) days.                 Discharge Diet:      regular diet    Activity at Discharge:      as tolerated    Follow-up Appointments:    Future Appointments  Date Time Provider Department Center   4/27/2017 9:00 AM [facility] - [facility] None   4/27/2017 9:45 AM Carmen Butts MD [facility] None      follow-up with Dr. Coyne in 2 weeks' time  Follow-up with Dr. Parrish in 1 month  Follow-up with primary care physician in one week    Return to the emergency room if worsen with worsening fever and chills, joint pain, or shortness of breath    Test Results Pending at Discharge:     Order Current Status    Blood Culture Preliminary result    Blood Culture Preliminary result    Blood Culture With MCKAYLA Preliminary result    Blood Culture With MCKAYLA Preliminary result             Benton Lacy DO  04/26/17  10:44 AM

## 2017-04-26 NOTE — PLAN OF CARE
Problem: Patient Care Overview (Adult)  Goal: Plan of Care Review  Outcome: Ongoing (interventions implemented as appropriate)    04/26/17 1630   Coping/Psychosocial Response Interventions   Plan Of Care Reviewed With patient   Patient Care Overview   Progress improving   Outcome Evaluation   Outcome Summary/Follow up Plan patient is tolerating IV antibiotic therapy

## 2017-04-27 ENCOUNTER — TELEPHONE (OUTPATIENT)
Dept: INTERNAL MEDICINE | Facility: CLINIC | Age: 57
End: 2017-04-27

## 2017-04-27 LAB
BACTERIA SPEC AEROBE CULT: NORMAL
GLUCOSE BLDC GLUCOMTR-MCNC: 80 MG/DL (ref 70–130)

## 2017-04-27 NOTE — PROGRESS NOTES
Continued Stay Note  ZAC Nagel     Patient Name: Demi Meyer  MRN: [id number]  Today's Date: 4/27/2017    Admit Date: 4/18/2017          Discharge Plan       04/27/17 1429    Case Management/Social Work Plan    Plan Antibiotics delivered to pt's room prior to discharge    Final Note    Final Note Discharged to home after PICC line placed and confirmed 4/26/17              Discharge Codes     None        Expected Discharge Date and Time     Expected Discharge Date Expected Discharge Time    Apr 26, 2017             Cassandra Escalante

## 2017-04-28 LAB
BACTERIA SPEC AEROBE CULT: ABNORMAL
BACTERIA SPEC AEROBE CULT: ABNORMAL
GRAM STN SPEC: ABNORMAL
ISOLATED FROM: ABNORMAL

## 2017-04-30 LAB
BACTERIA SPEC AEROBE CULT: NORMAL
BACTERIA SPEC AEROBE CULT: NORMAL

## 2017-05-01 ENCOUNTER — OFFICE VISIT (OUTPATIENT)
Dept: INTERNAL MEDICINE | Facility: CLINIC | Age: 57
End: 2017-05-01

## 2017-05-01 VITALS
HEIGHT: 70 IN | TEMPERATURE: 98.1 F | DIASTOLIC BLOOD PRESSURE: 58 MMHG | SYSTOLIC BLOOD PRESSURE: 122 MMHG | HEART RATE: 69 BPM | WEIGHT: 218 LBS | OXYGEN SATURATION: 96 % | BODY MASS INDEX: 31.21 KG/M2

## 2017-05-01 DIAGNOSIS — M25.50 MULTIPLE JOINT PAIN: Primary | ICD-10-CM

## 2017-05-01 DIAGNOSIS — I33.0 SEPTIC ENDOCARDITIS: ICD-10-CM

## 2017-05-01 PROCEDURE — 99214 OFFICE O/P EST MOD 30 MIN: CPT | Performed by: FAMILY MEDICINE

## 2017-05-01 RX ORDER — MORPHINE SULFATE 30 MG/1
30 TABLET, FILM COATED, EXTENDED RELEASE ORAL 2 TIMES DAILY
Qty: 60 TABLET | Refills: 0 | Status: SHIPPED | OUTPATIENT
Start: 2017-05-01 | End: 2017-06-01

## 2017-05-01 RX ORDER — METOPROLOL SUCCINATE 50 MG/1
50 TABLET, EXTENDED RELEASE ORAL
Qty: 30 TABLET | Refills: 2 | Status: SHIPPED | OUTPATIENT
Start: 2017-05-01 | End: 2017-06-06 | Stop reason: ALTCHOICE

## 2017-05-01 RX ORDER — OXYCODONE AND ACETAMINOPHEN 10; 325 MG/1; MG/1
TABLET ORAL
Qty: 90 TABLET | Refills: 0 | Status: SHIPPED | OUTPATIENT
Start: 2017-05-01 | End: 2017-06-01

## 2017-05-01 RX ORDER — RANITIDINE 150 MG/1
150 TABLET ORAL 2 TIMES DAILY
Status: ON HOLD | COMMUNITY
End: 2017-05-12

## 2017-05-11 ENCOUNTER — HOSPITAL ENCOUNTER (INPATIENT)
Facility: HOSPITAL | Age: 57
LOS: 5 days | Discharge: ANOTHER HOSPITAL | End: 2017-05-16
Attending: EMERGENCY MEDICINE | Admitting: INTERNAL MEDICINE

## 2017-05-11 ENCOUNTER — APPOINTMENT (OUTPATIENT)
Dept: CT IMAGING | Facility: HOSPITAL | Age: 57
End: 2017-05-11

## 2017-05-11 ENCOUNTER — APPOINTMENT (OUTPATIENT)
Dept: GENERAL RADIOLOGY | Facility: HOSPITAL | Age: 57
End: 2017-05-11

## 2017-05-11 ENCOUNTER — OFFICE VISIT (OUTPATIENT)
Dept: FAMILY MEDICINE CLINIC | Facility: CLINIC | Age: 57
End: 2017-05-11

## 2017-05-11 VITALS
HEART RATE: 100 BPM | WEIGHT: 213 LBS | BODY MASS INDEX: 30.49 KG/M2 | TEMPERATURE: 98.4 F | OXYGEN SATURATION: 92 % | HEIGHT: 70 IN

## 2017-05-11 DIAGNOSIS — J18.9 PNEUMONIA, PRIMARY ATYPICAL: Primary | ICD-10-CM

## 2017-05-11 DIAGNOSIS — R06.02 SHORTNESS OF BREATH: ICD-10-CM

## 2017-05-11 DIAGNOSIS — R09.02 HYPOXEMIA: Primary | ICD-10-CM

## 2017-05-11 LAB
ALBUMIN SERPL-MCNC: 3.6 G/DL (ref 3.5–5)
ALBUMIN/GLOB SERPL: 0.8 G/DL (ref 1–2)
ALP SERPL-CCNC: 127 U/L (ref 38–126)
ALT SERPL W P-5'-P-CCNC: 47 U/L (ref 13–69)
AMYLASE SERPL-CCNC: 93 U/L (ref 30–110)
ANION GAP SERPL CALCULATED.3IONS-SCNC: 19.2 MMOL/L
AST SERPL-CCNC: 38 U/L (ref 15–46)
BACTERIA UR QL AUTO: ABNORMAL /HPF
BASOPHILS # BLD AUTO: 0.07 10*3/MM3 (ref 0–0.2)
BASOPHILS NFR BLD AUTO: 0.5 % (ref 0–2.5)
BILIRUB SERPL-MCNC: 1.3 MG/DL (ref 0.2–1.3)
BILIRUB UR QL STRIP: ABNORMAL
BUN BLD-MCNC: 12 MG/DL (ref 7–20)
BUN/CREAT SERPL: 15 (ref 6.3–21.9)
CALCIUM SPEC-SCNC: 9.2 MG/DL (ref 8.4–10.2)
CHLORIDE SERPL-SCNC: 100 MMOL/L (ref 98–107)
CLARITY UR: CLEAR
CO2 SERPL-SCNC: 25 MMOL/L (ref 26–30)
COLOR UR: ABNORMAL
CREAT BLD-MCNC: 0.8 MG/DL (ref 0.6–1.3)
D-LACTATE SERPL-SCNC: 1.2 MMOL/L (ref 0.5–2)
DEPRECATED RDW RBC AUTO: 45.8 FL (ref 37–54)
EOSINOPHIL # BLD AUTO: 0.87 10*3/MM3 (ref 0–0.7)
EOSINOPHIL NFR BLD AUTO: 6 % (ref 0–7)
ERYTHROCYTE [DISTWIDTH] IN BLOOD BY AUTOMATED COUNT: 13.8 % (ref 11.5–14.5)
GFR SERPL CREATININE-BSD FRML MDRD: 101 ML/MIN/1.73
GLOBULIN UR ELPH-MCNC: 4.5 GM/DL
GLUCOSE BLD-MCNC: 105 MG/DL (ref 74–98)
GLUCOSE UR STRIP-MCNC: NEGATIVE MG/DL
HCT VFR BLD AUTO: 42.2 % (ref 42–52)
HGB BLD-MCNC: 13.9 G/DL (ref 14–18)
HGB UR QL STRIP.AUTO: ABNORMAL
HOLD SPECIMEN: NORMAL
HOLD SPECIMEN: NORMAL
HYALINE CASTS UR QL AUTO: ABNORMAL /LPF
IMM GRANULOCYTES # BLD: 0.15 10*3/MM3 (ref 0–0.06)
IMM GRANULOCYTES NFR BLD: 1 % (ref 0–0.6)
KETONES UR QL STRIP: NEGATIVE
LEUKOCYTE ESTERASE UR QL STRIP.AUTO: NEGATIVE
LIPASE SERPL-CCNC: 142 U/L (ref 23–300)
LYMPHOCYTES # BLD AUTO: 1.71 10*3/MM3 (ref 0.6–3.4)
LYMPHOCYTES NFR BLD AUTO: 11.8 % (ref 10–50)
MCH RBC QN AUTO: 30 PG (ref 27–31)
MCHC RBC AUTO-ENTMCNC: 32.9 G/DL (ref 30–37)
MCV RBC AUTO: 90.9 FL (ref 80–94)
MONOCYTES # BLD AUTO: 1.43 10*3/MM3 (ref 0–0.9)
MONOCYTES NFR BLD AUTO: 9.9 % (ref 0–12)
MUCOUS THREADS URNS QL MICRO: ABNORMAL /HPF
NEUTROPHILS # BLD AUTO: 10.28 10*3/MM3 (ref 2–6.9)
NEUTROPHILS NFR BLD AUTO: 70.8 % (ref 37–80)
NITRITE UR QL STRIP: NEGATIVE
NRBC BLD MANUAL-RTO: 0 /100 WBC (ref 0–0)
NT-PROBNP SERPL-MCNC: 4240 PG/ML (ref 0–125)
PH UR STRIP.AUTO: 6 [PH] (ref 5–8)
PLATELET # BLD AUTO: 300 10*3/MM3 (ref 130–400)
PMV BLD AUTO: 10.2 FL (ref 6–12)
POTASSIUM BLD-SCNC: 4.2 MMOL/L (ref 3.5–5.1)
PROT SERPL-MCNC: 8.1 G/DL (ref 6.3–8.2)
PROT UR QL STRIP: ABNORMAL
RBC # BLD AUTO: 4.64 10*6/MM3 (ref 4.7–6.1)
RBC # UR: ABNORMAL /HPF
REF LAB TEST METHOD: ABNORMAL
SODIUM BLD-SCNC: 140 MMOL/L (ref 137–145)
SP GR UR STRIP: 1.02 (ref 1–1.03)
SQUAMOUS #/AREA URNS HPF: ABNORMAL /HPF
TROPONIN I SERPL-MCNC: 0.01 NG/ML (ref 0–0.03)
TROPONIN I SERPL-MCNC: <0.012 NG/ML (ref 0–0.03)
UROBILINOGEN UR QL STRIP: ABNORMAL
WBC NRBC COR # BLD: 14.51 10*3/MM3 (ref 4.8–10.8)
WBC UR QL AUTO: ABNORMAL /HPF
WHOLE BLOOD HOLD SPECIMEN: NORMAL
WHOLE BLOOD HOLD SPECIMEN: NORMAL

## 2017-05-11 PROCEDURE — 25010000002 LEVOFLOXACIN PER 250 MG: Performed by: FAMILY MEDICINE

## 2017-05-11 PROCEDURE — 84484 ASSAY OF TROPONIN QUANT: CPT | Performed by: FAMILY MEDICINE

## 2017-05-11 PROCEDURE — 87081 CULTURE SCREEN ONLY: CPT | Performed by: FAMILY MEDICINE

## 2017-05-11 PROCEDURE — 82150 ASSAY OF AMYLASE: CPT | Performed by: EMERGENCY MEDICINE

## 2017-05-11 PROCEDURE — 84484 ASSAY OF TROPONIN QUANT: CPT | Performed by: EMERGENCY MEDICINE

## 2017-05-11 PROCEDURE — 25010000002 CEFEPIME PER 500 MG: Performed by: FAMILY MEDICINE

## 2017-05-11 PROCEDURE — 83880 ASSAY OF NATRIURETIC PEPTIDE: CPT | Performed by: EMERGENCY MEDICINE

## 2017-05-11 PROCEDURE — 85025 COMPLETE CBC W/AUTO DIFF WBC: CPT | Performed by: EMERGENCY MEDICINE

## 2017-05-11 PROCEDURE — 81001 URINALYSIS AUTO W/SCOPE: CPT | Performed by: EMERGENCY MEDICINE

## 2017-05-11 PROCEDURE — 99213 OFFICE O/P EST LOW 20 MIN: CPT | Performed by: INTERNAL MEDICINE

## 2017-05-11 PROCEDURE — 99285 EMERGENCY DEPT VISIT HI MDM: CPT

## 2017-05-11 PROCEDURE — 99222 1ST HOSP IP/OBS MODERATE 55: CPT | Performed by: FAMILY MEDICINE

## 2017-05-11 PROCEDURE — 25010000002 METHYLPREDNISOLONE PER 125 MG: Performed by: EMERGENCY MEDICINE

## 2017-05-11 PROCEDURE — 83605 ASSAY OF LACTIC ACID: CPT | Performed by: EMERGENCY MEDICINE

## 2017-05-11 PROCEDURE — 93005 ELECTROCARDIOGRAM TRACING: CPT | Performed by: EMERGENCY MEDICINE

## 2017-05-11 PROCEDURE — 71010 HC CHEST PA OR AP: CPT

## 2017-05-11 PROCEDURE — 25010000002 ENOXAPARIN PER 10 MG: Performed by: FAMILY MEDICINE

## 2017-05-11 PROCEDURE — 94640 AIRWAY INHALATION TREATMENT: CPT

## 2017-05-11 PROCEDURE — 87040 BLOOD CULTURE FOR BACTERIA: CPT

## 2017-05-11 PROCEDURE — 71250 CT THORAX DX C-: CPT

## 2017-05-11 PROCEDURE — 25010000002 VANCOMYCIN PER 500 MG: Performed by: INTERNAL MEDICINE

## 2017-05-11 PROCEDURE — 83690 ASSAY OF LIPASE: CPT | Performed by: EMERGENCY MEDICINE

## 2017-05-11 PROCEDURE — 25010000002 METHYLPREDNISOLONE PER 125 MG: Performed by: FAMILY MEDICINE

## 2017-05-11 PROCEDURE — 80053 COMPREHEN METABOLIC PANEL: CPT | Performed by: EMERGENCY MEDICINE

## 2017-05-11 RX ORDER — SODIUM CHLORIDE 0.9 % (FLUSH) 0.9 %
1-10 SYRINGE (ML) INJECTION AS NEEDED
Status: DISCONTINUED | OUTPATIENT
Start: 2017-05-11 | End: 2017-05-17 | Stop reason: HOSPADM

## 2017-05-11 RX ORDER — L.ACID,PARA/B.BIFIDUM/S.THERM 8B CELL
1 CAPSULE ORAL DAILY
Status: DISCONTINUED | OUTPATIENT
Start: 2017-05-11 | End: 2017-05-17 | Stop reason: HOSPADM

## 2017-05-11 RX ORDER — ONDANSETRON 4 MG/1
4 TABLET, FILM COATED ORAL EVERY 6 HOURS PRN
Status: DISCONTINUED | OUTPATIENT
Start: 2017-05-11 | End: 2017-05-16

## 2017-05-11 RX ORDER — BRIMONIDINE TARTRATE AND TIMOLOL MALEATE 2; 5 MG/ML; MG/ML
2 SOLUTION OPHTHALMIC DAILY
Status: DISCONTINUED | OUTPATIENT
Start: 2017-05-11 | End: 2017-05-11

## 2017-05-11 RX ORDER — METOPROLOL SUCCINATE 50 MG/1
50 TABLET, EXTENDED RELEASE ORAL
Status: DISCONTINUED | OUTPATIENT
Start: 2017-05-11 | End: 2017-05-16

## 2017-05-11 RX ORDER — BUPROPION HYDROCHLORIDE 150 MG/1
150 TABLET, EXTENDED RELEASE ORAL 3 TIMES DAILY
Status: DISCONTINUED | OUTPATIENT
Start: 2017-05-11 | End: 2017-05-17 | Stop reason: HOSPADM

## 2017-05-11 RX ORDER — IPRATROPIUM BROMIDE AND ALBUTEROL SULFATE 2.5; .5 MG/3ML; MG/3ML
3 SOLUTION RESPIRATORY (INHALATION) ONCE
Status: COMPLETED | OUTPATIENT
Start: 2017-05-11 | End: 2017-05-11

## 2017-05-11 RX ORDER — OXYCODONE AND ACETAMINOPHEN 10; 325 MG/1; MG/1
1 TABLET ORAL EVERY 4 HOURS PRN
Status: DISCONTINUED | OUTPATIENT
Start: 2017-05-11 | End: 2017-05-17 | Stop reason: HOSPADM

## 2017-05-11 RX ORDER — SODIUM CHLORIDE 9 MG/ML
100 INJECTION, SOLUTION INTRAVENOUS CONTINUOUS
Status: DISCONTINUED | OUTPATIENT
Start: 2017-05-11 | End: 2017-05-13

## 2017-05-11 RX ORDER — FAMOTIDINE 20 MG/1
20 TABLET, FILM COATED ORAL 2 TIMES DAILY
Status: DISCONTINUED | OUTPATIENT
Start: 2017-05-11 | End: 2017-05-17 | Stop reason: HOSPADM

## 2017-05-11 RX ORDER — ASPIRIN 325 MG
325 TABLET, DELAYED RELEASE (ENTERIC COATED) ORAL EVERY 6 HOURS PRN
Status: DISCONTINUED | OUTPATIENT
Start: 2017-05-11 | End: 2017-05-17 | Stop reason: HOSPADM

## 2017-05-11 RX ORDER — ONDANSETRON 2 MG/ML
4 INJECTION INTRAMUSCULAR; INTRAVENOUS EVERY 6 HOURS PRN
Status: DISCONTINUED | OUTPATIENT
Start: 2017-05-11 | End: 2017-05-17 | Stop reason: HOSPADM

## 2017-05-11 RX ORDER — SODIUM CHLORIDE 0.9 % (FLUSH) 0.9 %
10 SYRINGE (ML) INJECTION AS NEEDED
Status: DISCONTINUED | OUTPATIENT
Start: 2017-05-11 | End: 2017-05-17 | Stop reason: HOSPADM

## 2017-05-11 RX ORDER — DIAZEPAM 5 MG/1
5 TABLET ORAL EVERY 12 HOURS
Status: DISCONTINUED | OUTPATIENT
Start: 2017-05-11 | End: 2017-05-17 | Stop reason: HOSPADM

## 2017-05-11 RX ORDER — L. ACIDOPHILUS/L.BULGARICUS 1MM CELL
2 TABLET ORAL 3 TIMES DAILY
Status: DISCONTINUED | OUTPATIENT
Start: 2017-05-11 | End: 2017-05-11 | Stop reason: CLARIF

## 2017-05-11 RX ORDER — MORPHINE SULFATE 30 MG/1
30 TABLET, FILM COATED, EXTENDED RELEASE ORAL EVERY 12 HOURS SCHEDULED
Status: DISCONTINUED | OUTPATIENT
Start: 2017-05-11 | End: 2017-05-17 | Stop reason: HOSPADM

## 2017-05-11 RX ORDER — LEVOFLOXACIN 5 MG/ML
750 INJECTION, SOLUTION INTRAVENOUS EVERY 24 HOURS
Status: DISCONTINUED | OUTPATIENT
Start: 2017-05-11 | End: 2017-05-11 | Stop reason: SDUPTHER

## 2017-05-11 RX ORDER — BRIMONIDINE TARTRATE AND TIMOLOL MALEATE 2; 5 MG/ML; MG/ML
1 SOLUTION OPHTHALMIC EVERY 12 HOURS
Status: DISCONTINUED | OUTPATIENT
Start: 2017-05-11 | End: 2017-05-17 | Stop reason: HOSPADM

## 2017-05-11 RX ORDER — BISACODYL 10 MG
10 SUPPOSITORY, RECTAL RECTAL DAILY PRN
Status: DISCONTINUED | OUTPATIENT
Start: 2017-05-11 | End: 2017-05-17 | Stop reason: HOSPADM

## 2017-05-11 RX ORDER — GUAIFENESIN 600 MG/1
600 TABLET, EXTENDED RELEASE ORAL 2 TIMES DAILY
Status: DISCONTINUED | OUTPATIENT
Start: 2017-05-11 | End: 2017-05-17 | Stop reason: HOSPADM

## 2017-05-11 RX ORDER — ACETAMINOPHEN 325 MG/1
650 TABLET ORAL EVERY 4 HOURS PRN
Status: DISCONTINUED | OUTPATIENT
Start: 2017-05-11 | End: 2017-05-17 | Stop reason: HOSPADM

## 2017-05-11 RX ORDER — IPRATROPIUM BROMIDE AND ALBUTEROL SULFATE 2.5; .5 MG/3ML; MG/3ML
3 SOLUTION RESPIRATORY (INHALATION) EVERY 4 HOURS PRN
Status: DISCONTINUED | OUTPATIENT
Start: 2017-05-11 | End: 2017-05-17 | Stop reason: HOSPADM

## 2017-05-11 RX ORDER — LEVOFLOXACIN 5 MG/ML
750 INJECTION, SOLUTION INTRAVENOUS EVERY 24 HOURS
Status: DISCONTINUED | OUTPATIENT
Start: 2017-05-11 | End: 2017-05-12

## 2017-05-11 RX ORDER — IPRATROPIUM BROMIDE AND ALBUTEROL SULFATE 2.5; .5 MG/3ML; MG/3ML
3 SOLUTION RESPIRATORY (INHALATION)
Status: DISCONTINUED | OUTPATIENT
Start: 2017-05-11 | End: 2017-05-16

## 2017-05-11 RX ORDER — ASPIRIN 325 MG
325 TABLET, DELAYED RELEASE (ENTERIC COATED) ORAL EVERY 6 HOURS PRN
COMMUNITY
End: 2017-06-06

## 2017-05-11 RX ORDER — METHYLPREDNISOLONE SODIUM SUCCINATE 125 MG/2ML
125 INJECTION, POWDER, LYOPHILIZED, FOR SOLUTION INTRAMUSCULAR; INTRAVENOUS ONCE
Status: COMPLETED | OUTPATIENT
Start: 2017-05-11 | End: 2017-05-11

## 2017-05-11 RX ORDER — METHYLPREDNISOLONE SODIUM SUCCINATE 125 MG/2ML
80 INJECTION, POWDER, LYOPHILIZED, FOR SOLUTION INTRAMUSCULAR; INTRAVENOUS EVERY 8 HOURS
Status: DISCONTINUED | OUTPATIENT
Start: 2017-05-11 | End: 2017-05-14

## 2017-05-11 RX ORDER — ONDANSETRON 4 MG/1
4 TABLET, ORALLY DISINTEGRATING ORAL EVERY 6 HOURS PRN
Status: DISCONTINUED | OUTPATIENT
Start: 2017-05-11 | End: 2017-05-16

## 2017-05-11 RX ORDER — MORPHINE SULFATE 30 MG/1
30 TABLET, FILM COATED, EXTENDED RELEASE ORAL 2 TIMES DAILY
Status: DISCONTINUED | OUTPATIENT
Start: 2017-05-11 | End: 2017-05-11 | Stop reason: SDUPTHER

## 2017-05-11 RX ADMIN — ONDANSETRON 4 MG: 4 TABLET, ORALLY DISINTEGRATING ORAL at 17:07

## 2017-05-11 RX ADMIN — FAMOTIDINE 20 MG: 20 TABLET, FILM COATED ORAL at 21:33

## 2017-05-11 RX ADMIN — METHYLPREDNISOLONE SODIUM SUCCINATE 125 MG: 125 INJECTION, POWDER, FOR SOLUTION INTRAMUSCULAR; INTRAVENOUS at 11:36

## 2017-05-11 RX ADMIN — SODIUM CHLORIDE 1000 ML: 9 INJECTION, SOLUTION INTRAVENOUS at 11:36

## 2017-05-11 RX ADMIN — BUPROPION HYDROCHLORIDE 150 MG: 150 TABLET, FILM COATED, EXTENDED RELEASE ORAL at 21:33

## 2017-05-11 RX ADMIN — VANCOMYCIN HYDROCHLORIDE 2500 MG: 500 INJECTION, POWDER, LYOPHILIZED, FOR SOLUTION INTRAVENOUS at 17:36

## 2017-05-11 RX ADMIN — METHYLPREDNISOLONE SODIUM SUCCINATE 80 MG: 125 INJECTION, POWDER, FOR SOLUTION INTRAMUSCULAR; INTRAVENOUS at 18:40

## 2017-05-11 RX ADMIN — METOPROLOL SUCCINATE 50 MG: 50 TABLET, EXTENDED RELEASE ORAL at 17:15

## 2017-05-11 RX ADMIN — Medication 1 CAPSULE: at 17:08

## 2017-05-11 RX ADMIN — GUAIFENESIN 600 MG: 600 TABLET, EXTENDED RELEASE ORAL at 17:08

## 2017-05-11 RX ADMIN — LEVOFLOXACIN 750 MG: 5 INJECTION, SOLUTION INTRAVENOUS at 21:33

## 2017-05-11 RX ADMIN — CEFEPIME 1 G: 1 INJECTION, POWDER, FOR SOLUTION INTRAMUSCULAR; INTRAVENOUS at 14:43

## 2017-05-11 RX ADMIN — SODIUM CHLORIDE 100 ML/HR: 9 INJECTION, SOLUTION INTRAVENOUS at 18:45

## 2017-05-11 RX ADMIN — MORPHINE SULFATE 30 MG: 30 TABLET, EXTENDED RELEASE ORAL at 21:33

## 2017-05-11 RX ADMIN — BRIMONIDINE TARTRATE, TIMOLOL MALEATE 1 DROP: 2; 5 SOLUTION/ DROPS OPHTHALMIC at 21:34

## 2017-05-11 RX ADMIN — DIAZEPAM 5 MG: 5 TABLET ORAL at 17:01

## 2017-05-11 RX ADMIN — CEFEPIME 1 G: 1 INJECTION, POWDER, FOR SOLUTION INTRAMUSCULAR; INTRAVENOUS at 16:48

## 2017-05-11 RX ADMIN — BUPROPION HYDROCHLORIDE 150 MG: 150 TABLET, FILM COATED, EXTENDED RELEASE ORAL at 17:00

## 2017-05-11 RX ADMIN — IPRATROPIUM BROMIDE AND ALBUTEROL SULFATE 3 ML: .5; 3 SOLUTION RESPIRATORY (INHALATION) at 12:12

## 2017-05-11 RX ADMIN — ENOXAPARIN SODIUM 40 MG: 40 INJECTION SUBCUTANEOUS at 17:00

## 2017-05-11 RX ADMIN — OXYCODONE HYDROCHLORIDE AND ACETAMINOPHEN 1 TABLET: 10; 325 TABLET ORAL at 17:01

## 2017-05-12 ENCOUNTER — TELEPHONE (OUTPATIENT)
Dept: INTERNAL MEDICINE | Facility: CLINIC | Age: 57
End: 2017-05-12

## 2017-05-12 ENCOUNTER — APPOINTMENT (OUTPATIENT)
Dept: CARDIOLOGY | Facility: HOSPITAL | Age: 57
End: 2017-05-12
Attending: FAMILY MEDICINE

## 2017-05-12 LAB
ANION GAP SERPL CALCULATED.3IONS-SCNC: 15.5 MMOL/L
BASOPHILS # BLD AUTO: 0.02 10*3/MM3 (ref 0–0.2)
BASOPHILS NFR BLD AUTO: 0.1 % (ref 0–2.5)
BH CV ECHO MEAS - % IVS THICK: 29.2 %
BH CV ECHO MEAS - % LVPW THICK: 40.9 %
BH CV ECHO MEAS - AO ACC SLOPE: 2123 CM/SEC^2
BH CV ECHO MEAS - AO ACC TIME: 0.09 SEC
BH CV ECHO MEAS - AO MAX PG (FULL): 5.2 MMHG
BH CV ECHO MEAS - AO MAX PG: 13.3 MMHG
BH CV ECHO MEAS - AO ROOT AREA (BSA CORRECTED): 1.6
BH CV ECHO MEAS - AO ROOT AREA: 9.7 CM^2
BH CV ECHO MEAS - AO ROOT DIAM: 3.5 CM
BH CV ECHO MEAS - AO V2 MAX: 182.4 CM/SEC
BH CV ECHO MEAS - AVA(V,A): 1.7 CM^2
BH CV ECHO MEAS - AVA(V,D): 1.7 CM^2
BH CV ECHO MEAS - BSA(HAYCOCK): 2.2 M^2
BH CV ECHO MEAS - BSA: 2.2 M^2
BH CV ECHO MEAS - BZI_BMI: 31.6 KILOGRAMS/M^2
BH CV ECHO MEAS - BZI_METRIC_HEIGHT: 177.8 CM
BH CV ECHO MEAS - BZI_METRIC_WEIGHT: 99.8 KG
BH CV ECHO MEAS - CONTRAST EF 4CH: 54.3 ML/M^2
BH CV ECHO MEAS - EDV(CUBED): 328.9 ML
BH CV ECHO MEAS - EDV(MOD-SP4): 92 ML
BH CV ECHO MEAS - EDV(TEICH): 247.5 ML
BH CV ECHO MEAS - EF(CUBED): 84.7 %
BH CV ECHO MEAS - EF(MOD-SP4): 54.3 %
BH CV ECHO MEAS - EF(TEICH): 76.7 %
BH CV ECHO MEAS - ESV(CUBED): 50.2 ML
BH CV ECHO MEAS - ESV(MOD-SP4): 42 ML
BH CV ECHO MEAS - ESV(TEICH): 57.8 ML
BH CV ECHO MEAS - FS: 46.5 %
BH CV ECHO MEAS - IVS/LVPW: 1.1
BH CV ECHO MEAS - IVSD: 1 CM
BH CV ECHO MEAS - IVSS: 1.3 CM
BH CV ECHO MEAS - LA DIMENSION: 4.8 CM
BH CV ECHO MEAS - LA/AO: 1.4
BH CV ECHO MEAS - LV DIASTOLIC VOL/BSA (35-75): 42.3 ML/M^2
BH CV ECHO MEAS - LV MASS(C)D: 313.4 GRAMS
BH CV ECHO MEAS - LV MASS(C)DI: 144.2 GRAMS/M^2
BH CV ECHO MEAS - LV MASS(C)S: 175 GRAMS
BH CV ECHO MEAS - LV MASS(C)SI: 80.5 GRAMS/M^2
BH CV ECHO MEAS - LV MAX PG: 8.1 MMHG
BH CV ECHO MEAS - LV MEAN PG: 6.6 MMHG
BH CV ECHO MEAS - LV SYSTOLIC VOL/BSA (12-30): 19.3 ML/M^2
BH CV ECHO MEAS - LV V1 MAX: 101.4 CM/SEC
BH CV ECHO MEAS - LV V1 MEAN: 115.3 CM/SEC
BH CV ECHO MEAS - LV V1 VTI: 37.4 CM
BH CV ECHO MEAS - LVIDD: 6.9 CM
BH CV ECHO MEAS - LVIDS: 3.7 CM
BH CV ECHO MEAS - LVLD AP4: 7.3 CM
BH CV ECHO MEAS - LVLS AP4: 5.9 CM
BH CV ECHO MEAS - LVOT AREA (M): 3.1 CM^2
BH CV ECHO MEAS - LVOT AREA: 3.1 CM^2
BH CV ECHO MEAS - LVOT DIAM: 2 CM
BH CV ECHO MEAS - LVPWD: 0.96 CM
BH CV ECHO MEAS - LVPWS: 1.3 CM
BH CV ECHO MEAS - MV A MAX VEL: 40.1 CM/SEC
BH CV ECHO MEAS - MV E MAX VEL: 205.1 CM/SEC
BH CV ECHO MEAS - MV E/A: 5.1
BH CV ECHO MEAS - MV MAX PG: 15.3 MMHG
BH CV ECHO MEAS - MV MEAN PG: 3.1 MMHG
BH CV ECHO MEAS - MV V2 MAX: 195.9 CM/SEC
BH CV ECHO MEAS - MV V2 MEAN: 67 CM/SEC
BH CV ECHO MEAS - MV V2 VTI: 37.7 CM
BH CV ECHO MEAS - MVA(VTI): 3.1 CM^2
BH CV ECHO MEAS - PA ACC SLOPE: 691.3 CM/SEC^2
BH CV ECHO MEAS - PA ACC TIME: 0.11 SEC
BH CV ECHO MEAS - PA MAX PG: 3.4 MMHG
BH CV ECHO MEAS - PA MEAN PG: 1.7 MMHG
BH CV ECHO MEAS - PA PR(ACCEL): 28.3 MMHG
BH CV ECHO MEAS - PA V2 MAX: 92.5 CM/SEC
BH CV ECHO MEAS - PA V2 MEAN: 60 CM/SEC
BH CV ECHO MEAS - PA V2 VTI: 20.7 CM
BH CV ECHO MEAS - SI(CUBED): 128.2 ML/M^2
BH CV ECHO MEAS - SI(LVOT): 53.1 ML/M^2
BH CV ECHO MEAS - SI(MOD-SP4): 23 ML/M^2
BH CV ECHO MEAS - SI(TEICH): 87.3 ML/M^2
BH CV ECHO MEAS - SV(CUBED): 278.6 ML
BH CV ECHO MEAS - SV(LVOT): 115.5 ML
BH CV ECHO MEAS - SV(MOD-SP4): 50 ML
BH CV ECHO MEAS - SV(TEICH): 189.7 ML
BH CV ECHO MEAS - TR MAX VEL: 256.4 CM/SEC
BUN BLD-MCNC: 14 MG/DL (ref 7–20)
BUN/CREAT SERPL: 23.3 (ref 6.3–21.9)
CALCIUM SPEC-SCNC: 8.1 MG/DL (ref 8.4–10.2)
CHLORIDE SERPL-SCNC: 106 MMOL/L (ref 98–107)
CO2 SERPL-SCNC: 22 MMOL/L (ref 26–30)
CREAT BLD-MCNC: 0.6 MG/DL (ref 0.6–1.3)
DEPRECATED RDW RBC AUTO: 46.6 FL (ref 37–54)
EOSINOPHIL # BLD AUTO: 0.01 10*3/MM3 (ref 0–0.7)
EOSINOPHIL NFR BLD AUTO: 0.1 % (ref 0–7)
ERYTHROCYTE [DISTWIDTH] IN BLOOD BY AUTOMATED COUNT: 13.7 % (ref 11.5–14.5)
GFR SERPL CREATININE-BSD FRML MDRD: 140 ML/MIN/1.73
GLUCOSE BLD-MCNC: 234 MG/DL (ref 74–98)
HCT VFR BLD AUTO: 38.5 % (ref 42–52)
HGB BLD-MCNC: 12.4 G/DL (ref 14–18)
IMM GRANULOCYTES # BLD: 0.17 10*3/MM3 (ref 0–0.06)
IMM GRANULOCYTES NFR BLD: 1.2 % (ref 0–0.6)
LYMPHOCYTES # BLD AUTO: 0.92 10*3/MM3 (ref 0.6–3.4)
LYMPHOCYTES NFR BLD AUTO: 6.6 % (ref 10–50)
MCH RBC QN AUTO: 29.7 PG (ref 27–31)
MCHC RBC AUTO-ENTMCNC: 32.2 G/DL (ref 30–37)
MCV RBC AUTO: 92.1 FL (ref 80–94)
MONOCYTES # BLD AUTO: 0.45 10*3/MM3 (ref 0–0.9)
MONOCYTES NFR BLD AUTO: 3.2 % (ref 0–12)
NEUTROPHILS # BLD AUTO: 12.43 10*3/MM3 (ref 2–6.9)
NEUTROPHILS NFR BLD AUTO: 88.8 % (ref 37–80)
NRBC BLD MANUAL-RTO: 0 /100 WBC (ref 0–0)
PLATELET # BLD AUTO: 269 10*3/MM3 (ref 130–400)
PMV BLD AUTO: 10.4 FL (ref 6–12)
POTASSIUM BLD-SCNC: 4.5 MMOL/L (ref 3.5–5.1)
RBC # BLD AUTO: 4.18 10*6/MM3 (ref 4.7–6.1)
SODIUM BLD-SCNC: 139 MMOL/L (ref 137–145)
WBC NRBC COR # BLD: 14 10*3/MM3 (ref 4.8–10.8)

## 2017-05-12 PROCEDURE — 93306 TTE W/DOPPLER COMPLETE: CPT

## 2017-05-12 PROCEDURE — 99232 SBSQ HOSP IP/OBS MODERATE 35: CPT | Performed by: FAMILY MEDICINE

## 2017-05-12 PROCEDURE — 25010000002 ENOXAPARIN PER 10 MG: Performed by: FAMILY MEDICINE

## 2017-05-12 PROCEDURE — 80048 BASIC METABOLIC PNL TOTAL CA: CPT | Performed by: FAMILY MEDICINE

## 2017-05-12 PROCEDURE — 99254 IP/OBS CNSLTJ NEW/EST MOD 60: CPT | Performed by: INTERNAL MEDICINE

## 2017-05-12 PROCEDURE — 94799 UNLISTED PULMONARY SVC/PX: CPT

## 2017-05-12 PROCEDURE — 25010000002 VANCOMYCIN PER 500 MG: Performed by: INTERNAL MEDICINE

## 2017-05-12 PROCEDURE — 25010000002 LEVOFLOXACIN PER 250 MG: Performed by: FAMILY MEDICINE

## 2017-05-12 PROCEDURE — 85025 COMPLETE CBC W/AUTO DIFF WBC: CPT | Performed by: FAMILY MEDICINE

## 2017-05-12 PROCEDURE — 25010000002 METHYLPREDNISOLONE PER 125 MG: Performed by: FAMILY MEDICINE

## 2017-05-12 PROCEDURE — 25010000002 CEFEPIME PER 500 MG: Performed by: FAMILY MEDICINE

## 2017-05-12 RX ORDER — LEVOFLOXACIN 5 MG/ML
750 INJECTION, SOLUTION INTRAVENOUS EVERY 24 HOURS
Status: DISCONTINUED | OUTPATIENT
Start: 2017-05-12 | End: 2017-05-16

## 2017-05-12 RX ORDER — AMLODIPINE BESYLATE AND BENAZEPRIL HYDROCHLORIDE 10; 40 MG/1; MG/1
1 CAPSULE ORAL DAILY
COMMUNITY
End: 2017-06-01

## 2017-05-12 RX ADMIN — METHYLPREDNISOLONE SODIUM SUCCINATE 80 MG: 125 INJECTION, POWDER, FOR SOLUTION INTRAMUSCULAR; INTRAVENOUS at 04:12

## 2017-05-12 RX ADMIN — METHYLPREDNISOLONE SODIUM SUCCINATE 80 MG: 125 INJECTION, POWDER, FOR SOLUTION INTRAMUSCULAR; INTRAVENOUS at 18:00

## 2017-05-12 RX ADMIN — CEFEPIME 1 G: 1 INJECTION, POWDER, FOR SOLUTION INTRAMUSCULAR; INTRAVENOUS at 17:05

## 2017-05-12 RX ADMIN — FAMOTIDINE 20 MG: 20 TABLET, FILM COATED ORAL at 21:36

## 2017-05-12 RX ADMIN — BRIMONIDINE TARTRATE, TIMOLOL MALEATE 1 DROP: 2; 5 SOLUTION/ DROPS OPHTHALMIC at 21:36

## 2017-05-12 RX ADMIN — BUPROPION HYDROCHLORIDE 150 MG: 150 TABLET, FILM COATED, EXTENDED RELEASE ORAL at 21:36

## 2017-05-12 RX ADMIN — MORPHINE SULFATE 30 MG: 30 TABLET, EXTENDED RELEASE ORAL at 09:54

## 2017-05-12 RX ADMIN — VANCOMYCIN HYDROCHLORIDE 2000 MG: 500 INJECTION, POWDER, LYOPHILIZED, FOR SOLUTION INTRAVENOUS at 12:49

## 2017-05-12 RX ADMIN — VANCOMYCIN HYDROCHLORIDE 1750 MG: 500 INJECTION, POWDER, LYOPHILIZED, FOR SOLUTION INTRAVENOUS at 04:14

## 2017-05-12 RX ADMIN — MORPHINE SULFATE 30 MG: 30 TABLET, EXTENDED RELEASE ORAL at 21:36

## 2017-05-12 RX ADMIN — FAMOTIDINE 20 MG: 20 TABLET, FILM COATED ORAL at 09:54

## 2017-05-12 RX ADMIN — DIAZEPAM 5 MG: 5 TABLET ORAL at 17:21

## 2017-05-12 RX ADMIN — BRIMONIDINE TARTRATE, TIMOLOL MALEATE 1 DROP: 2; 5 SOLUTION/ DROPS OPHTHALMIC at 10:16

## 2017-05-12 RX ADMIN — BUPROPION HYDROCHLORIDE 150 MG: 150 TABLET, FILM COATED, EXTENDED RELEASE ORAL at 09:54

## 2017-05-12 RX ADMIN — CEFEPIME 1 G: 1 INJECTION, POWDER, FOR SOLUTION INTRAMUSCULAR; INTRAVENOUS at 09:53

## 2017-05-12 RX ADMIN — LEVOFLOXACIN 750 MG: 5 INJECTION, SOLUTION INTRAVENOUS at 23:31

## 2017-05-12 RX ADMIN — GUAIFENESIN 600 MG: 600 TABLET, EXTENDED RELEASE ORAL at 09:55

## 2017-05-12 RX ADMIN — DIAZEPAM 5 MG: 5 TABLET ORAL at 06:37

## 2017-05-12 RX ADMIN — IPRATROPIUM BROMIDE AND ALBUTEROL SULFATE 3 ML: .5; 3 SOLUTION RESPIRATORY (INHALATION) at 13:26

## 2017-05-12 RX ADMIN — Medication 1 CAPSULE: at 09:54

## 2017-05-12 RX ADMIN — VANCOMYCIN HYDROCHLORIDE 2000 MG: 500 INJECTION, POWDER, LYOPHILIZED, FOR SOLUTION INTRAVENOUS at 21:37

## 2017-05-12 RX ADMIN — OXYCODONE HYDROCHLORIDE AND ACETAMINOPHEN 1 TABLET: 10; 325 TABLET ORAL at 17:55

## 2017-05-12 RX ADMIN — METHYLPREDNISOLONE SODIUM SUCCINATE 80 MG: 125 INJECTION, POWDER, FOR SOLUTION INTRAMUSCULAR; INTRAVENOUS at 10:17

## 2017-05-12 RX ADMIN — CEFEPIME 1 G: 1 INJECTION, POWDER, FOR SOLUTION INTRAMUSCULAR; INTRAVENOUS at 01:14

## 2017-05-12 RX ADMIN — OXYCODONE HYDROCHLORIDE AND ACETAMINOPHEN 1 TABLET: 10; 325 TABLET ORAL at 12:54

## 2017-05-12 RX ADMIN — IPRATROPIUM BROMIDE AND ALBUTEROL SULFATE 3 ML: .5; 3 SOLUTION RESPIRATORY (INHALATION) at 00:25

## 2017-05-12 RX ADMIN — BUPROPION HYDROCHLORIDE 150 MG: 150 TABLET, FILM COATED, EXTENDED RELEASE ORAL at 17:04

## 2017-05-12 RX ADMIN — IPRATROPIUM BROMIDE AND ALBUTEROL SULFATE 3 ML: .5; 3 SOLUTION RESPIRATORY (INHALATION) at 20:05

## 2017-05-12 RX ADMIN — GUAIFENESIN 600 MG: 600 TABLET, EXTENDED RELEASE ORAL at 21:36

## 2017-05-12 RX ADMIN — IPRATROPIUM BROMIDE AND ALBUTEROL SULFATE 3 ML: .5; 3 SOLUTION RESPIRATORY (INHALATION) at 07:31

## 2017-05-12 RX ADMIN — ENOXAPARIN SODIUM 40 MG: 40 INJECTION SUBCUTANEOUS at 16:00

## 2017-05-12 RX ADMIN — OXYCODONE HYDROCHLORIDE AND ACETAMINOPHEN 1 TABLET: 10; 325 TABLET ORAL at 04:12

## 2017-05-12 RX ADMIN — METOPROLOL SUCCINATE 50 MG: 50 TABLET, EXTENDED RELEASE ORAL at 09:54

## 2017-05-13 ENCOUNTER — APPOINTMENT (OUTPATIENT)
Dept: GENERAL RADIOLOGY | Facility: HOSPITAL | Age: 57
End: 2017-05-13

## 2017-05-13 LAB
ANION GAP SERPL CALCULATED.3IONS-SCNC: 14.2 MMOL/L
ARTERIAL PATENCY WRIST A: POSITIVE
ATMOSPHERIC PRESS: 732 MMHG
BASE EXCESS BLDA CALC-SCNC: -3.1 MMOL/L
BDY SITE: ABNORMAL
BUN BLD-MCNC: 17 MG/DL (ref 7–20)
BUN/CREAT SERPL: 24.3 (ref 6.3–21.9)
CALCIUM SPEC-SCNC: 8.2 MG/DL (ref 8.4–10.2)
CHLORIDE SERPL-SCNC: 108 MMOL/L (ref 98–107)
CO2 SERPL-SCNC: 25 MMOL/L (ref 26–30)
CREAT BLD-MCNC: 0.7 MG/DL (ref 0.6–1.3)
DEPRECATED RDW RBC AUTO: 47.5 FL (ref 37–54)
ERYTHROCYTE [DISTWIDTH] IN BLOOD BY AUTOMATED COUNT: 13.9 % (ref 11.5–14.5)
GFR SERPL CREATININE-BSD FRML MDRD: 118 ML/MIN/1.73
GLUCOSE BLD-MCNC: 143 MG/DL (ref 74–98)
HCO3 BLDA-SCNC: 22.2 MMOL/L (ref 22–28)
HCT VFR BLD AUTO: 39.7 % (ref 42–52)
HGB BLD-MCNC: 12.7 G/DL (ref 14–18)
HGB BLDA-MCNC: 13.4 G/DL (ref 12–18)
HOROWITZ INDEX BLD+IHG-RTO: 50 %
MCH RBC QN AUTO: 30.1 PG (ref 27–31)
MCHC RBC AUTO-ENTMCNC: 32 G/DL (ref 30–37)
MCV RBC AUTO: 94.1 FL (ref 80–94)
MODALITY: ABNORMAL
PCO2 BLDA: 38.6 MM HG (ref 35–45)
PH BLDA: 7.37 PH UNITS
PLATELET # BLD AUTO: 324 10*3/MM3 (ref 130–400)
PMV BLD AUTO: 10.4 FL (ref 6–12)
PO2 BLDA: 63.7 MM HG (ref 75–100)
POTASSIUM BLD-SCNC: 5.2 MMOL/L (ref 3.5–5.1)
RBC # BLD AUTO: 4.22 10*6/MM3 (ref 4.7–6.1)
SAO2 % BLDCOA: 92.2 %
SODIUM BLD-SCNC: 142 MMOL/L (ref 137–145)
VANCOMYCIN TROUGH SERPL-MCNC: 14.06 MCG/ML (ref 5–15)
WBC NRBC COR # BLD: 25.38 10*3/MM3 (ref 4.8–10.8)

## 2017-05-13 PROCEDURE — 85027 COMPLETE CBC AUTOMATED: CPT | Performed by: FAMILY MEDICINE

## 2017-05-13 PROCEDURE — 25010000002 VANCOMYCIN PER 500 MG: Performed by: INTERNAL MEDICINE

## 2017-05-13 PROCEDURE — 87205 SMEAR GRAM STAIN: CPT | Performed by: FAMILY MEDICINE

## 2017-05-13 PROCEDURE — 25010000002 CEFEPIME PER 500 MG: Performed by: FAMILY MEDICINE

## 2017-05-13 PROCEDURE — 25010000002 ENOXAPARIN PER 10 MG: Performed by: FAMILY MEDICINE

## 2017-05-13 PROCEDURE — 82805 BLOOD GASES W/O2 SATURATION: CPT

## 2017-05-13 PROCEDURE — 25010000002 LEVOFLOXACIN PER 250 MG: Performed by: FAMILY MEDICINE

## 2017-05-13 PROCEDURE — 94799 UNLISTED PULMONARY SVC/PX: CPT

## 2017-05-13 PROCEDURE — 99232 SBSQ HOSP IP/OBS MODERATE 35: CPT | Performed by: INTERNAL MEDICINE

## 2017-05-13 PROCEDURE — 80048 BASIC METABOLIC PNL TOTAL CA: CPT | Performed by: FAMILY MEDICINE

## 2017-05-13 PROCEDURE — 80202 ASSAY OF VANCOMYCIN: CPT | Performed by: INTERNAL MEDICINE

## 2017-05-13 PROCEDURE — 25010000002 METHYLPREDNISOLONE PER 125 MG: Performed by: FAMILY MEDICINE

## 2017-05-13 PROCEDURE — 71010 HC CHEST PA OR AP: CPT

## 2017-05-13 PROCEDURE — 36600 WITHDRAWAL OF ARTERIAL BLOOD: CPT

## 2017-05-13 PROCEDURE — 25010000002 ONDANSETRON PER 1 MG: Performed by: FAMILY MEDICINE

## 2017-05-13 PROCEDURE — 25010000002 FUROSEMIDE PER 20 MG: Performed by: INTERNAL MEDICINE

## 2017-05-13 PROCEDURE — 87077 CULTURE AEROBIC IDENTIFY: CPT | Performed by: FAMILY MEDICINE

## 2017-05-13 PROCEDURE — 87070 CULTURE OTHR SPECIMN AEROBIC: CPT | Performed by: FAMILY MEDICINE

## 2017-05-13 RX ORDER — ALUMINA, MAGNESIA, AND SIMETHICONE 2400; 2400; 240 MG/30ML; MG/30ML; MG/30ML
10 SUSPENSION ORAL EVERY 6 HOURS PRN
Status: DISCONTINUED | OUTPATIENT
Start: 2017-05-13 | End: 2017-05-17 | Stop reason: HOSPADM

## 2017-05-13 RX ORDER — FUROSEMIDE 10 MG/ML
40 INJECTION INTRAMUSCULAR; INTRAVENOUS ONCE
Status: COMPLETED | OUTPATIENT
Start: 2017-05-13 | End: 2017-05-13

## 2017-05-13 RX ADMIN — OXYCODONE HYDROCHLORIDE AND ACETAMINOPHEN 1 TABLET: 10; 325 TABLET ORAL at 08:27

## 2017-05-13 RX ADMIN — IPRATROPIUM BROMIDE AND ALBUTEROL SULFATE 3 ML: .5; 3 SOLUTION RESPIRATORY (INHALATION) at 20:27

## 2017-05-13 RX ADMIN — METHYLPREDNISOLONE SODIUM SUCCINATE 80 MG: 125 INJECTION, POWDER, FOR SOLUTION INTRAMUSCULAR; INTRAVENOUS at 18:47

## 2017-05-13 RX ADMIN — Medication 1 CAPSULE: at 08:27

## 2017-05-13 RX ADMIN — ONDANSETRON 4 MG: 2 INJECTION INTRAMUSCULAR; INTRAVENOUS at 02:44

## 2017-05-13 RX ADMIN — LEVOFLOXACIN 750 MG: 5 INJECTION, SOLUTION INTRAVENOUS at 21:59

## 2017-05-13 RX ADMIN — DIAZEPAM 5 MG: 5 TABLET ORAL at 06:08

## 2017-05-13 RX ADMIN — FAMOTIDINE 20 MG: 20 TABLET, FILM COATED ORAL at 08:26

## 2017-05-13 RX ADMIN — GUAIFENESIN 600 MG: 600 TABLET, EXTENDED RELEASE ORAL at 08:26

## 2017-05-13 RX ADMIN — CEFEPIME 1 G: 1 INJECTION, POWDER, FOR SOLUTION INTRAMUSCULAR; INTRAVENOUS at 01:31

## 2017-05-13 RX ADMIN — VANCOMYCIN HYDROCHLORIDE 2000 MG: 500 INJECTION, POWDER, LYOPHILIZED, FOR SOLUTION INTRAVENOUS at 20:45

## 2017-05-13 RX ADMIN — DIAZEPAM 5 MG: 5 TABLET ORAL at 17:05

## 2017-05-13 RX ADMIN — METHYLPREDNISOLONE SODIUM SUCCINATE 80 MG: 125 INJECTION, POWDER, FOR SOLUTION INTRAMUSCULAR; INTRAVENOUS at 10:45

## 2017-05-13 RX ADMIN — OXYCODONE HYDROCHLORIDE AND ACETAMINOPHEN 1 TABLET: 10; 325 TABLET ORAL at 22:05

## 2017-05-13 RX ADMIN — OXYCODONE HYDROCHLORIDE AND ACETAMINOPHEN 1 TABLET: 10; 325 TABLET ORAL at 04:11

## 2017-05-13 RX ADMIN — IPRATROPIUM BROMIDE AND ALBUTEROL SULFATE 3 ML: .5; 3 SOLUTION RESPIRATORY (INHALATION) at 07:26

## 2017-05-13 RX ADMIN — FUROSEMIDE 40 MG: 10 INJECTION, SOLUTION INTRAMUSCULAR; INTRAVENOUS at 17:40

## 2017-05-13 RX ADMIN — MORPHINE SULFATE 30 MG: 30 TABLET, EXTENDED RELEASE ORAL at 08:27

## 2017-05-13 RX ADMIN — FAMOTIDINE 20 MG: 20 TABLET, FILM COATED ORAL at 21:58

## 2017-05-13 RX ADMIN — MORPHINE SULFATE 30 MG: 30 TABLET, EXTENDED RELEASE ORAL at 21:57

## 2017-05-13 RX ADMIN — BUPROPION HYDROCHLORIDE 150 MG: 150 TABLET, FILM COATED, EXTENDED RELEASE ORAL at 15:48

## 2017-05-13 RX ADMIN — CEFEPIME 1 G: 1 INJECTION, POWDER, FOR SOLUTION INTRAMUSCULAR; INTRAVENOUS at 08:26

## 2017-05-13 RX ADMIN — METOPROLOL SUCCINATE 50 MG: 50 TABLET, EXTENDED RELEASE ORAL at 08:26

## 2017-05-13 RX ADMIN — ONDANSETRON 4 MG: 4 TABLET, FILM COATED ORAL at 08:26

## 2017-05-13 RX ADMIN — METHYLPREDNISOLONE SODIUM SUCCINATE 80 MG: 125 INJECTION, POWDER, FOR SOLUTION INTRAMUSCULAR; INTRAVENOUS at 04:02

## 2017-05-13 RX ADMIN — BUPROPION HYDROCHLORIDE 150 MG: 150 TABLET, FILM COATED, EXTENDED RELEASE ORAL at 21:58

## 2017-05-13 RX ADMIN — CEFEPIME 1 G: 1 INJECTION, POWDER, FOR SOLUTION INTRAMUSCULAR; INTRAVENOUS at 23:09

## 2017-05-13 RX ADMIN — VANCOMYCIN HYDROCHLORIDE 2000 MG: 500 INJECTION, POWDER, LYOPHILIZED, FOR SOLUTION INTRAVENOUS at 13:05

## 2017-05-13 RX ADMIN — ENOXAPARIN SODIUM 40 MG: 40 INJECTION SUBCUTANEOUS at 15:48

## 2017-05-13 RX ADMIN — BRIMONIDINE TARTRATE, TIMOLOL MALEATE 1 DROP: 2; 5 SOLUTION/ DROPS OPHTHALMIC at 21:59

## 2017-05-13 RX ADMIN — GUAIFENESIN 600 MG: 600 TABLET, EXTENDED RELEASE ORAL at 21:57

## 2017-05-13 RX ADMIN — BUPROPION HYDROCHLORIDE 150 MG: 150 TABLET, FILM COATED, EXTENDED RELEASE ORAL at 08:27

## 2017-05-13 RX ADMIN — VANCOMYCIN HYDROCHLORIDE 2000 MG: 500 INJECTION, POWDER, LYOPHILIZED, FOR SOLUTION INTRAVENOUS at 04:03

## 2017-05-13 RX ADMIN — IPRATROPIUM BROMIDE AND ALBUTEROL SULFATE 3 ML: .5; 3 SOLUTION RESPIRATORY (INHALATION) at 01:16

## 2017-05-13 RX ADMIN — Medication: at 11:00

## 2017-05-13 RX ADMIN — ALUMINUM HYDROXIDE, MAGNESIUM HYDROXIDE, AND DIMETHICONE 10 ML: 400; 400; 40 SUSPENSION ORAL at 02:36

## 2017-05-13 RX ADMIN — IPRATROPIUM BROMIDE AND ALBUTEROL SULFATE 3 ML: .5; 3 SOLUTION RESPIRATORY (INHALATION) at 12:36

## 2017-05-13 RX ADMIN — BRIMONIDINE TARTRATE, TIMOLOL MALEATE 1 DROP: 2; 5 SOLUTION/ DROPS OPHTHALMIC at 08:27

## 2017-05-13 RX ADMIN — CEFEPIME 1 G: 1 INJECTION, POWDER, FOR SOLUTION INTRAMUSCULAR; INTRAVENOUS at 16:05

## 2017-05-14 ENCOUNTER — APPOINTMENT (OUTPATIENT)
Dept: GENERAL RADIOLOGY | Facility: HOSPITAL | Age: 57
End: 2017-05-14

## 2017-05-14 ENCOUNTER — APPOINTMENT (OUTPATIENT)
Dept: CARDIOLOGY | Facility: HOSPITAL | Age: 57
End: 2017-05-14
Attending: INTERNAL MEDICINE

## 2017-05-14 LAB
ACINETOBACTER SCREEN CX: NO GROWTH
ALBUMIN SERPL-MCNC: 3.2 G/DL (ref 3.5–5)
ANION GAP SERPL CALCULATED.3IONS-SCNC: 16.4 MMOL/L
BASOPHILS # BLD AUTO: 0.03 10*3/MM3 (ref 0–0.2)
BASOPHILS NFR BLD AUTO: 0.1 % (ref 0–2.5)
BUN BLD-MCNC: 17 MG/DL (ref 7–20)
BUN/CREAT SERPL: 24.3 (ref 6.3–21.9)
CALCIUM SPEC-SCNC: 8 MG/DL (ref 8.4–10.2)
CHLORIDE SERPL-SCNC: 104 MMOL/L (ref 98–107)
CO2 SERPL-SCNC: 27 MMOL/L (ref 26–30)
CREAT BLD-MCNC: 0.7 MG/DL (ref 0.6–1.3)
DEPRECATED RDW RBC AUTO: 49.9 FL (ref 37–54)
EOSINOPHIL # BLD AUTO: 0 10*3/MM3 (ref 0–0.7)
EOSINOPHIL NFR BLD AUTO: 0 % (ref 0–7)
ERYTHROCYTE [DISTWIDTH] IN BLOOD BY AUTOMATED COUNT: 14.1 % (ref 11.5–14.5)
GFR SERPL CREATININE-BSD FRML MDRD: 118 ML/MIN/1.73
GLUCOSE BLD-MCNC: 169 MG/DL (ref 74–98)
HCT VFR BLD AUTO: 41.9 % (ref 42–52)
HGB BLD-MCNC: 13.1 G/DL (ref 14–18)
IMM GRANULOCYTES # BLD: 0.41 10*3/MM3 (ref 0–0.06)
IMM GRANULOCYTES NFR BLD: 2 % (ref 0–0.6)
LYMPHOCYTES # BLD AUTO: 0.77 10*3/MM3 (ref 0.6–3.4)
LYMPHOCYTES NFR BLD AUTO: 3.7 % (ref 10–50)
MCH RBC QN AUTO: 29.9 PG (ref 27–31)
MCHC RBC AUTO-ENTMCNC: 31.3 G/DL (ref 30–37)
MCV RBC AUTO: 95.7 FL (ref 80–94)
MONOCYTES # BLD AUTO: 0.77 10*3/MM3 (ref 0–0.9)
MONOCYTES NFR BLD AUTO: 3.7 % (ref 0–12)
MRSA SPEC QL CULT: NO GROWTH
NEUTROPHILS # BLD AUTO: 18.85 10*3/MM3 (ref 2–6.9)
NEUTROPHILS NFR BLD AUTO: 90.5 % (ref 37–80)
NRBC BLD MANUAL-RTO: 0 /100 WBC (ref 0–0)
PHOSPHATE SERPL-MCNC: 3.5 MG/DL (ref 2.5–4.5)
PLATELET # BLD AUTO: 332 10*3/MM3 (ref 130–400)
PMV BLD AUTO: 10.4 FL (ref 6–12)
POTASSIUM BLD-SCNC: 4.4 MMOL/L (ref 3.5–5.1)
RBC # BLD AUTO: 4.38 10*6/MM3 (ref 4.7–6.1)
SODIUM BLD-SCNC: 143 MMOL/L (ref 137–145)
VRE SPEC QL CULT: NORMAL
WBC NRBC COR # BLD: 20.83 10*3/MM3 (ref 4.8–10.8)

## 2017-05-14 PROCEDURE — 25010000002 CEFEPIME PER 500 MG: Performed by: FAMILY MEDICINE

## 2017-05-14 PROCEDURE — 25010000002 FUROSEMIDE PER 20 MG: Performed by: INTERNAL MEDICINE

## 2017-05-14 PROCEDURE — 99233 SBSQ HOSP IP/OBS HIGH 50: CPT | Performed by: INTERNAL MEDICINE

## 2017-05-14 PROCEDURE — 25010000002 METHYLPREDNISOLONE PER 125 MG: Performed by: FAMILY MEDICINE

## 2017-05-14 PROCEDURE — 71010 HC CHEST PA OR AP: CPT

## 2017-05-14 PROCEDURE — 80069 RENAL FUNCTION PANEL: CPT | Performed by: INTERNAL MEDICINE

## 2017-05-14 PROCEDURE — 25010000002 ENOXAPARIN PER 10 MG: Performed by: FAMILY MEDICINE

## 2017-05-14 PROCEDURE — 25010000002 METHYLPREDNISOLONE PER 40 MG: Performed by: INTERNAL MEDICINE

## 2017-05-14 PROCEDURE — 94799 UNLISTED PULMONARY SVC/PX: CPT

## 2017-05-14 PROCEDURE — 85025 COMPLETE CBC W/AUTO DIFF WBC: CPT | Performed by: INTERNAL MEDICINE

## 2017-05-14 PROCEDURE — 25010000002 VANCOMYCIN PER 500 MG: Performed by: INTERNAL MEDICINE

## 2017-05-14 RX ORDER — METHYLPREDNISOLONE SODIUM SUCCINATE 40 MG/ML
40 INJECTION, POWDER, LYOPHILIZED, FOR SOLUTION INTRAMUSCULAR; INTRAVENOUS EVERY 8 HOURS
Status: DISCONTINUED | OUTPATIENT
Start: 2017-05-14 | End: 2017-05-17 | Stop reason: HOSPADM

## 2017-05-14 RX ORDER — FUROSEMIDE 10 MG/ML
20 INJECTION INTRAMUSCULAR; INTRAVENOUS ONCE
Status: COMPLETED | OUTPATIENT
Start: 2017-05-14 | End: 2017-05-14

## 2017-05-14 RX ADMIN — METHYLPREDNISOLONE SODIUM SUCCINATE 40 MG: 40 INJECTION, POWDER, FOR SOLUTION INTRAMUSCULAR; INTRAVENOUS at 21:50

## 2017-05-14 RX ADMIN — IPRATROPIUM BROMIDE AND ALBUTEROL SULFATE 3 ML: .5; 3 SOLUTION RESPIRATORY (INHALATION) at 01:51

## 2017-05-14 RX ADMIN — DIAZEPAM 5 MG: 5 TABLET ORAL at 05:43

## 2017-05-14 RX ADMIN — FAMOTIDINE 20 MG: 20 TABLET, FILM COATED ORAL at 09:36

## 2017-05-14 RX ADMIN — GUAIFENESIN 600 MG: 600 TABLET, EXTENDED RELEASE ORAL at 09:34

## 2017-05-14 RX ADMIN — BUPROPION HYDROCHLORIDE 150 MG: 150 TABLET, FILM COATED, EXTENDED RELEASE ORAL at 17:11

## 2017-05-14 RX ADMIN — CEFEPIME 1 G: 1 INJECTION, POWDER, FOR SOLUTION INTRAMUSCULAR; INTRAVENOUS at 17:12

## 2017-05-14 RX ADMIN — OXYCODONE HYDROCHLORIDE AND ACETAMINOPHEN 1 TABLET: 10; 325 TABLET ORAL at 17:03

## 2017-05-14 RX ADMIN — VANCOMYCIN HYDROCHLORIDE 2000 MG: 500 INJECTION, POWDER, LYOPHILIZED, FOR SOLUTION INTRAVENOUS at 11:56

## 2017-05-14 RX ADMIN — BUPROPION HYDROCHLORIDE 150 MG: 150 TABLET, FILM COATED, EXTENDED RELEASE ORAL at 09:36

## 2017-05-14 RX ADMIN — NYSTATIN 500000 UNITS: 500000 SUSPENSION ORAL at 23:16

## 2017-05-14 RX ADMIN — FAMOTIDINE 20 MG: 20 TABLET, FILM COATED ORAL at 21:49

## 2017-05-14 RX ADMIN — NYSTATIN 500000 UNITS: 500000 SUSPENSION ORAL at 13:54

## 2017-05-14 RX ADMIN — MORPHINE SULFATE 30 MG: 30 TABLET, EXTENDED RELEASE ORAL at 09:35

## 2017-05-14 RX ADMIN — Medication 1 CAPSULE: at 09:34

## 2017-05-14 RX ADMIN — ONDANSETRON 4 MG: 4 TABLET, FILM COATED ORAL at 17:15

## 2017-05-14 RX ADMIN — GUAIFENESIN 600 MG: 600 TABLET, EXTENDED RELEASE ORAL at 21:49

## 2017-05-14 RX ADMIN — IPRATROPIUM BROMIDE AND ALBUTEROL SULFATE 3 ML: .5; 3 SOLUTION RESPIRATORY (INHALATION) at 21:14

## 2017-05-14 RX ADMIN — METHYLPREDNISOLONE SODIUM SUCCINATE 80 MG: 125 INJECTION, POWDER, FOR SOLUTION INTRAMUSCULAR; INTRAVENOUS at 04:19

## 2017-05-14 RX ADMIN — VANCOMYCIN HYDROCHLORIDE 2000 MG: 500 INJECTION, POWDER, LYOPHILIZED, FOR SOLUTION INTRAVENOUS at 04:19

## 2017-05-14 RX ADMIN — ENOXAPARIN SODIUM 40 MG: 40 INJECTION SUBCUTANEOUS at 17:20

## 2017-05-14 RX ADMIN — IPRATROPIUM BROMIDE AND ALBUTEROL SULFATE 3 ML: .5; 3 SOLUTION RESPIRATORY (INHALATION) at 13:50

## 2017-05-14 RX ADMIN — OXYCODONE HYDROCHLORIDE AND ACETAMINOPHEN 1 TABLET: 10; 325 TABLET ORAL at 05:43

## 2017-05-14 RX ADMIN — IPRATROPIUM BROMIDE AND ALBUTEROL SULFATE 3 ML: .5; 3 SOLUTION RESPIRATORY (INHALATION) at 07:31

## 2017-05-14 RX ADMIN — MORPHINE SULFATE 30 MG: 30 TABLET, EXTENDED RELEASE ORAL at 21:49

## 2017-05-14 RX ADMIN — BRIMONIDINE TARTRATE, TIMOLOL MALEATE 1 DROP: 2; 5 SOLUTION/ DROPS OPHTHALMIC at 21:50

## 2017-05-14 RX ADMIN — BRIMONIDINE TARTRATE, TIMOLOL MALEATE 1 DROP: 2; 5 SOLUTION/ DROPS OPHTHALMIC at 09:34

## 2017-05-14 RX ADMIN — BUPROPION HYDROCHLORIDE 150 MG: 150 TABLET, FILM COATED, EXTENDED RELEASE ORAL at 21:49

## 2017-05-14 RX ADMIN — METHYLPREDNISOLONE SODIUM SUCCINATE 80 MG: 125 INJECTION, POWDER, FOR SOLUTION INTRAMUSCULAR; INTRAVENOUS at 11:45

## 2017-05-14 RX ADMIN — VANCOMYCIN HYDROCHLORIDE 2000 MG: 500 INJECTION, POWDER, LYOPHILIZED, FOR SOLUTION INTRAVENOUS at 21:49

## 2017-05-14 RX ADMIN — NYSTATIN 500000 UNITS: 500000 SUSPENSION ORAL at 18:04

## 2017-05-14 RX ADMIN — FUROSEMIDE 20 MG: 10 INJECTION, SOLUTION INTRAMUSCULAR; INTRAVENOUS at 14:54

## 2017-05-14 RX ADMIN — METOPROLOL SUCCINATE 50 MG: 50 TABLET, EXTENDED RELEASE ORAL at 09:34

## 2017-05-14 RX ADMIN — DIAZEPAM 5 MG: 5 TABLET ORAL at 18:04

## 2017-05-15 ENCOUNTER — APPOINTMENT (OUTPATIENT)
Dept: GENERAL RADIOLOGY | Facility: HOSPITAL | Age: 57
End: 2017-05-15

## 2017-05-15 PROBLEM — I87.8 POOR VENOUS ACCESS: Status: ACTIVE | Noted: 2017-05-15

## 2017-05-15 PROBLEM — R10.30 ABDOMINAL PAIN, LOWER: Status: RESOLVED | Noted: 2017-04-18 | Resolved: 2017-05-15

## 2017-05-15 PROBLEM — A49.02 MRSA INFECTION: Status: ACTIVE | Noted: 2017-05-15

## 2017-05-15 PROBLEM — J01.00 ACUTE MAXILLARY SINUSITIS: Status: RESOLVED | Noted: 2017-04-18 | Resolved: 2017-05-15

## 2017-05-15 PROBLEM — J96.01 ACUTE RESPIRATORY FAILURE WITH HYPOXIA (HCC): Status: ACTIVE | Noted: 2017-05-15

## 2017-05-15 PROBLEM — I05.9 ENDOCARDITIS OF MITRAL VALVE: Chronic | Status: ACTIVE | Noted: 2017-05-15

## 2017-05-15 PROBLEM — J18.9 HCAP (HEALTHCARE-ASSOCIATED PNEUMONIA): Chronic | Status: ACTIVE | Noted: 2017-05-15

## 2017-05-15 PROBLEM — Z86.19 PERSONAL HISTORY OF SEPSIS: Status: ACTIVE | Noted: 2017-04-18

## 2017-05-15 LAB
ALBUMIN SERPL-MCNC: 2.9 G/DL (ref 3.5–5)
ANION GAP SERPL CALCULATED.3IONS-SCNC: 11.5 MMOL/L
BASOPHILS # BLD AUTO: 0.05 10*3/MM3 (ref 0–0.2)
BASOPHILS NFR BLD AUTO: 0.3 % (ref 0–2.5)
BUN BLD-MCNC: 16 MG/DL (ref 7–20)
BUN/CREAT SERPL: 26.7 (ref 6.3–21.9)
CALCIUM SPEC-SCNC: 7.6 MG/DL (ref 8.4–10.2)
CHLORIDE SERPL-SCNC: 103 MMOL/L (ref 98–107)
CO2 SERPL-SCNC: 31 MMOL/L (ref 26–30)
CREAT BLD-MCNC: 0.6 MG/DL (ref 0.6–1.3)
DEPRECATED RDW RBC AUTO: 48.6 FL (ref 37–54)
EOSINOPHIL # BLD AUTO: 0 10*3/MM3 (ref 0–0.7)
EOSINOPHIL NFR BLD AUTO: 0 % (ref 0–7)
ERYTHROCYTE [DISTWIDTH] IN BLOOD BY AUTOMATED COUNT: 14 % (ref 11.5–14.5)
GFR SERPL CREATININE-BSD FRML MDRD: 140 ML/MIN/1.73
GLUCOSE BLD-MCNC: 128 MG/DL (ref 74–98)
HCT VFR BLD AUTO: 37.5 % (ref 42–52)
HGB BLD-MCNC: 11.8 G/DL (ref 14–18)
IMM GRANULOCYTES # BLD: 0.64 10*3/MM3 (ref 0–0.06)
IMM GRANULOCYTES NFR BLD: 3.5 % (ref 0–0.6)
LYMPHOCYTES # BLD AUTO: 0.94 10*3/MM3 (ref 0.6–3.4)
LYMPHOCYTES NFR BLD AUTO: 5.1 % (ref 10–50)
MAGNESIUM SERPL-MCNC: 2.3 MG/DL (ref 1.6–2.3)
MCH RBC QN AUTO: 29.6 PG (ref 27–31)
MCHC RBC AUTO-ENTMCNC: 31.5 G/DL (ref 30–37)
MCV RBC AUTO: 94.2 FL (ref 80–94)
MONOCYTES # BLD AUTO: 0.84 10*3/MM3 (ref 0–0.9)
MONOCYTES NFR BLD AUTO: 4.5 % (ref 0–12)
NEUTROPHILS # BLD AUTO: 16 10*3/MM3 (ref 2–6.9)
NEUTROPHILS NFR BLD AUTO: 86.6 % (ref 37–80)
NRBC BLD MANUAL-RTO: 0 /100 WBC (ref 0–0)
PHOSPHATE SERPL-MCNC: 2.9 MG/DL (ref 2.5–4.5)
PLATELET # BLD AUTO: 305 10*3/MM3 (ref 130–400)
PMV BLD AUTO: 10.1 FL (ref 6–12)
POTASSIUM BLD-SCNC: 4.5 MMOL/L (ref 3.5–5.1)
RBC # BLD AUTO: 3.98 10*6/MM3 (ref 4.7–6.1)
SODIUM BLD-SCNC: 141 MMOL/L (ref 137–145)
VANCOMYCIN TROUGH SERPL-MCNC: 15.38 MCG/ML (ref 5–15)
WBC NRBC COR # BLD: 18.47 10*3/MM3 (ref 4.8–10.8)

## 2017-05-15 PROCEDURE — 85025 COMPLETE CBC W/AUTO DIFF WBC: CPT | Performed by: INTERNAL MEDICINE

## 2017-05-15 PROCEDURE — 25010000002 CEFEPIME PER 500 MG: Performed by: FAMILY MEDICINE

## 2017-05-15 PROCEDURE — 25010000002 LEVOFLOXACIN PER 250 MG: Performed by: FAMILY MEDICINE

## 2017-05-15 PROCEDURE — 94799 UNLISTED PULMONARY SVC/PX: CPT

## 2017-05-15 PROCEDURE — 80202 ASSAY OF VANCOMYCIN: CPT | Performed by: INTERNAL MEDICINE

## 2017-05-15 PROCEDURE — 80069 RENAL FUNCTION PANEL: CPT | Performed by: INTERNAL MEDICINE

## 2017-05-15 PROCEDURE — 83735 ASSAY OF MAGNESIUM: CPT | Performed by: INTERNAL MEDICINE

## 2017-05-15 PROCEDURE — 25010000002 ENOXAPARIN PER 10 MG: Performed by: FAMILY MEDICINE

## 2017-05-15 PROCEDURE — 25010000002 METHYLPREDNISOLONE PER 40 MG: Performed by: INTERNAL MEDICINE

## 2017-05-15 PROCEDURE — 99255 IP/OBS CONSLTJ NEW/EST HI 80: CPT | Performed by: INTERNAL MEDICINE

## 2017-05-15 PROCEDURE — 71010 HC CHEST PA OR AP: CPT

## 2017-05-15 PROCEDURE — 93005 ELECTROCARDIOGRAM TRACING: CPT | Performed by: INTERNAL MEDICINE

## 2017-05-15 PROCEDURE — 99233 SBSQ HOSP IP/OBS HIGH 50: CPT | Performed by: FAMILY MEDICINE

## 2017-05-15 PROCEDURE — 25010000002 VANCOMYCIN PER 500 MG: Performed by: INTERNAL MEDICINE

## 2017-05-15 RX ADMIN — VANCOMYCIN HYDROCHLORIDE 2000 MG: 500 INJECTION, POWDER, LYOPHILIZED, FOR SOLUTION INTRAVENOUS at 12:17

## 2017-05-15 RX ADMIN — DIAZEPAM 5 MG: 5 TABLET ORAL at 05:00

## 2017-05-15 RX ADMIN — BUPROPION HYDROCHLORIDE 150 MG: 150 TABLET, FILM COATED, EXTENDED RELEASE ORAL at 16:59

## 2017-05-15 RX ADMIN — LEVOFLOXACIN 750 MG: 5 INJECTION, SOLUTION INTRAVENOUS at 22:21

## 2017-05-15 RX ADMIN — ENOXAPARIN SODIUM 40 MG: 40 INJECTION SUBCUTANEOUS at 17:00

## 2017-05-15 RX ADMIN — GUAIFENESIN 600 MG: 600 TABLET, EXTENDED RELEASE ORAL at 09:43

## 2017-05-15 RX ADMIN — IPRATROPIUM BROMIDE AND ALBUTEROL SULFATE 3 ML: .5; 3 SOLUTION RESPIRATORY (INHALATION) at 19:32

## 2017-05-15 RX ADMIN — Medication 1 CAPSULE: at 09:44

## 2017-05-15 RX ADMIN — NYSTATIN 500000 UNITS: 500000 SUSPENSION ORAL at 17:00

## 2017-05-15 RX ADMIN — BUPROPION HYDROCHLORIDE 150 MG: 150 TABLET, FILM COATED, EXTENDED RELEASE ORAL at 09:44

## 2017-05-15 RX ADMIN — METHYLPREDNISOLONE SODIUM SUCCINATE 40 MG: 40 INJECTION, POWDER, FOR SOLUTION INTRAMUSCULAR; INTRAVENOUS at 04:54

## 2017-05-15 RX ADMIN — GUAIFENESIN 600 MG: 600 TABLET, EXTENDED RELEASE ORAL at 20:11

## 2017-05-15 RX ADMIN — METOPROLOL SUCCINATE 50 MG: 50 TABLET, EXTENDED RELEASE ORAL at 09:44

## 2017-05-15 RX ADMIN — ONDANSETRON 4 MG: 4 TABLET, FILM COATED ORAL at 09:51

## 2017-05-15 RX ADMIN — VANCOMYCIN HYDROCHLORIDE 2000 MG: 500 INJECTION, POWDER, LYOPHILIZED, FOR SOLUTION INTRAVENOUS at 20:10

## 2017-05-15 RX ADMIN — CEFEPIME 1 G: 1 INJECTION, POWDER, FOR SOLUTION INTRAMUSCULAR; INTRAVENOUS at 01:52

## 2017-05-15 RX ADMIN — OXYCODONE HYDROCHLORIDE AND ACETAMINOPHEN 1 TABLET: 10; 325 TABLET ORAL at 23:17

## 2017-05-15 RX ADMIN — FAMOTIDINE 20 MG: 20 TABLET, FILM COATED ORAL at 09:44

## 2017-05-15 RX ADMIN — METHYLPREDNISOLONE SODIUM SUCCINATE 40 MG: 40 INJECTION, POWDER, FOR SOLUTION INTRAMUSCULAR; INTRAVENOUS at 12:17

## 2017-05-15 RX ADMIN — FAMOTIDINE 20 MG: 20 TABLET, FILM COATED ORAL at 20:11

## 2017-05-15 RX ADMIN — NYSTATIN 500000 UNITS: 500000 SUSPENSION ORAL at 20:11

## 2017-05-15 RX ADMIN — IPRATROPIUM BROMIDE AND ALBUTEROL SULFATE 3 ML: .5; 3 SOLUTION RESPIRATORY (INHALATION) at 13:17

## 2017-05-15 RX ADMIN — OXYCODONE HYDROCHLORIDE AND ACETAMINOPHEN 1 TABLET: 10; 325 TABLET ORAL at 15:19

## 2017-05-15 RX ADMIN — BUPROPION HYDROCHLORIDE 150 MG: 150 TABLET, FILM COATED, EXTENDED RELEASE ORAL at 20:11

## 2017-05-15 RX ADMIN — CEFEPIME 1 G: 1 INJECTION, POWDER, FOR SOLUTION INTRAMUSCULAR; INTRAVENOUS at 16:59

## 2017-05-15 RX ADMIN — MORPHINE SULFATE 30 MG: 30 TABLET, EXTENDED RELEASE ORAL at 20:11

## 2017-05-15 RX ADMIN — DIAZEPAM 5 MG: 5 TABLET ORAL at 17:00

## 2017-05-15 RX ADMIN — LEVOFLOXACIN 750 MG: 5 INJECTION, SOLUTION INTRAVENOUS at 00:14

## 2017-05-15 RX ADMIN — IPRATROPIUM BROMIDE AND ALBUTEROL SULFATE 3 ML: .5; 3 SOLUTION RESPIRATORY (INHALATION) at 07:35

## 2017-05-15 RX ADMIN — BRIMONIDINE TARTRATE, TIMOLOL MALEATE 1 DROP: 2; 5 SOLUTION/ DROPS OPHTHALMIC at 20:11

## 2017-05-15 RX ADMIN — METHYLPREDNISOLONE SODIUM SUCCINATE 40 MG: 40 INJECTION, POWDER, FOR SOLUTION INTRAMUSCULAR; INTRAVENOUS at 20:11

## 2017-05-15 RX ADMIN — NYSTATIN 500000 UNITS: 500000 SUSPENSION ORAL at 09:43

## 2017-05-15 RX ADMIN — OXYCODONE HYDROCHLORIDE AND ACETAMINOPHEN 1 TABLET: 10; 325 TABLET ORAL at 02:48

## 2017-05-15 RX ADMIN — VANCOMYCIN HYDROCHLORIDE 2000 MG: 500 INJECTION, POWDER, LYOPHILIZED, FOR SOLUTION INTRAVENOUS at 04:53

## 2017-05-15 RX ADMIN — CEFEPIME 1 G: 1 INJECTION, POWDER, FOR SOLUTION INTRAMUSCULAR; INTRAVENOUS at 11:03

## 2017-05-15 RX ADMIN — OXYCODONE HYDROCHLORIDE AND ACETAMINOPHEN 1 TABLET: 10; 325 TABLET ORAL at 09:51

## 2017-05-15 RX ADMIN — IPRATROPIUM BROMIDE AND ALBUTEROL SULFATE 3 ML: .5; 3 SOLUTION RESPIRATORY (INHALATION) at 01:45

## 2017-05-15 RX ADMIN — MORPHINE SULFATE 30 MG: 30 TABLET, EXTENDED RELEASE ORAL at 09:44

## 2017-05-15 RX ADMIN — BRIMONIDINE TARTRATE, TIMOLOL MALEATE 1 DROP: 2; 5 SOLUTION/ DROPS OPHTHALMIC at 09:51

## 2017-05-15 RX ADMIN — NYSTATIN 500000 UNITS: 500000 SUSPENSION ORAL at 12:17

## 2017-05-16 ENCOUNTER — APPOINTMENT (OUTPATIENT)
Dept: GENERAL RADIOLOGY | Facility: HOSPITAL | Age: 57
End: 2017-05-16

## 2017-05-16 VITALS
SYSTOLIC BLOOD PRESSURE: 146 MMHG | RESPIRATION RATE: 18 BRPM | DIASTOLIC BLOOD PRESSURE: 69 MMHG | BODY MASS INDEX: 32.64 KG/M2 | TEMPERATURE: 97.8 F | HEART RATE: 64 BPM | WEIGHT: 228 LBS | HEIGHT: 70 IN | OXYGEN SATURATION: 93 %

## 2017-05-16 LAB
BACTERIA SPEC AEROBE CULT: NORMAL
BACTERIA SPEC AEROBE CULT: NORMAL
BACTERIA SPEC RESP CULT: ABNORMAL
GRAM STN SPEC: ABNORMAL

## 2017-05-16 PROCEDURE — 25010000002 METHYLPREDNISOLONE PER 40 MG: Performed by: INTERNAL MEDICINE

## 2017-05-16 PROCEDURE — 25010000002 ONDANSETRON PER 1 MG: Performed by: FAMILY MEDICINE

## 2017-05-16 PROCEDURE — 87449 NOS EACH ORGANISM AG IA: CPT | Performed by: INTERNAL MEDICINE

## 2017-05-16 PROCEDURE — 87081 CULTURE SCREEN ONLY: CPT | Performed by: FAMILY MEDICINE

## 2017-05-16 PROCEDURE — 99238 HOSP IP/OBS DSCHRG MGMT 30/<: CPT | Performed by: FAMILY MEDICINE

## 2017-05-16 PROCEDURE — 99233 SBSQ HOSP IP/OBS HIGH 50: CPT | Performed by: FAMILY MEDICINE

## 2017-05-16 PROCEDURE — 71010 HC CHEST PA OR AP: CPT

## 2017-05-16 PROCEDURE — 25010000002 CEFEPIME PER 500 MG: Performed by: FAMILY MEDICINE

## 2017-05-16 PROCEDURE — 99233 SBSQ HOSP IP/OBS HIGH 50: CPT | Performed by: INTERNAL MEDICINE

## 2017-05-16 PROCEDURE — 25010000002 ENOXAPARIN PER 10 MG: Performed by: FAMILY MEDICINE

## 2017-05-16 PROCEDURE — 94799 UNLISTED PULMONARY SVC/PX: CPT

## 2017-05-16 PROCEDURE — 25010000002 VANCOMYCIN PER 500 MG: Performed by: INTERNAL MEDICINE

## 2017-05-16 RX ORDER — SODIUM CHLORIDE 0.9 % (FLUSH) 0.9 %
1-10 SYRINGE (ML) INJECTION AS NEEDED
Start: 2017-05-16 | End: 2017-06-06 | Stop reason: ALTCHOICE

## 2017-05-16 RX ORDER — LEVOFLOXACIN 5 MG/ML
500 INJECTION, SOLUTION INTRAVENOUS EVERY 24 HOURS
Status: DISCONTINUED | OUTPATIENT
Start: 2017-05-16 | End: 2017-05-17 | Stop reason: HOSPADM

## 2017-05-16 RX ORDER — ALUMINA, MAGNESIA, AND SIMETHICONE 2400; 2400; 240 MG/30ML; MG/30ML; MG/30ML
10 SUSPENSION ORAL EVERY 6 HOURS PRN
Refills: 0
Start: 2017-05-16 | End: 2017-08-18

## 2017-05-16 RX ORDER — METOPROLOL SUCCINATE 25 MG/1
25 TABLET, EXTENDED RELEASE ORAL
Status: DISCONTINUED | OUTPATIENT
Start: 2017-05-16 | End: 2017-05-17 | Stop reason: HOSPADM

## 2017-05-16 RX ORDER — ONDANSETRON 2 MG/ML
4 INJECTION INTRAMUSCULAR; INTRAVENOUS EVERY 6 HOURS PRN
Start: 2017-05-16 | End: 2017-06-01

## 2017-05-16 RX ORDER — SODIUM CHLORIDE 0.9 % (FLUSH) 0.9 %
10 SYRINGE (ML) INJECTION AS NEEDED
Start: 2017-05-16 | End: 2017-06-06 | Stop reason: ALTCHOICE

## 2017-05-16 RX ORDER — BISACODYL 10 MG
10 SUPPOSITORY, RECTAL RECTAL DAILY PRN
Refills: 0
Start: 2017-05-16 | End: 2017-07-10

## 2017-05-16 RX ORDER — ACETAMINOPHEN 325 MG/1
650 TABLET ORAL EVERY 4 HOURS PRN
Refills: 0
Start: 2017-05-16 | End: 2017-06-06 | Stop reason: ALTCHOICE

## 2017-05-16 RX ORDER — L.ACID,PARA/B.BIFIDUM/S.THERM 8B CELL
1 CAPSULE ORAL DAILY
Start: 2017-05-16 | End: 2017-07-10

## 2017-05-16 RX ORDER — IPRATROPIUM BROMIDE AND ALBUTEROL SULFATE 2.5; .5 MG/3ML; MG/3ML
3 SOLUTION RESPIRATORY (INHALATION) EVERY 4 HOURS PRN
Qty: 360 ML
Start: 2017-05-16 | End: 2017-06-01

## 2017-05-16 RX ORDER — FAMOTIDINE 20 MG/1
20 TABLET, FILM COATED ORAL 2 TIMES DAILY
Start: 2017-05-16 | End: 2017-06-01

## 2017-05-16 RX ORDER — LEVOFLOXACIN 5 MG/ML
500 INJECTION, SOLUTION INTRAVENOUS EVERY 24 HOURS
Refills: 0
Start: 2017-05-16 | End: 2017-06-01

## 2017-05-16 RX ORDER — METHYLPREDNISOLONE SODIUM SUCCINATE 40 MG/ML
40 INJECTION, POWDER, LYOPHILIZED, FOR SOLUTION INTRAMUSCULAR; INTRAVENOUS EVERY 8 HOURS
Start: 2017-05-16 | End: 2017-06-06 | Stop reason: ALTCHOICE

## 2017-05-16 RX ORDER — GUAIFENESIN 600 MG/1
600 TABLET, EXTENDED RELEASE ORAL 2 TIMES DAILY
Start: 2017-05-16 | End: 2017-06-01

## 2017-05-16 RX ADMIN — BUPROPION HYDROCHLORIDE 150 MG: 150 TABLET, FILM COATED, EXTENDED RELEASE ORAL at 16:35

## 2017-05-16 RX ADMIN — CEFEPIME 1 G: 1 INJECTION, POWDER, FOR SOLUTION INTRAMUSCULAR; INTRAVENOUS at 09:00

## 2017-05-16 RX ADMIN — Medication 1 CAPSULE: at 09:16

## 2017-05-16 RX ADMIN — BUPROPION HYDROCHLORIDE 150 MG: 150 TABLET, FILM COATED, EXTENDED RELEASE ORAL at 09:17

## 2017-05-16 RX ADMIN — MORPHINE SULFATE 30 MG: 30 TABLET, EXTENDED RELEASE ORAL at 20:57

## 2017-05-16 RX ADMIN — IPRATROPIUM BROMIDE AND ALBUTEROL SULFATE 3 ML: .5; 3 SOLUTION RESPIRATORY (INHALATION) at 00:24

## 2017-05-16 RX ADMIN — CEFEPIME 1 G: 1 INJECTION, POWDER, FOR SOLUTION INTRAMUSCULAR; INTRAVENOUS at 00:18

## 2017-05-16 RX ADMIN — METHYLPREDNISOLONE SODIUM SUCCINATE 40 MG: 40 INJECTION, POWDER, FOR SOLUTION INTRAMUSCULAR; INTRAVENOUS at 20:57

## 2017-05-16 RX ADMIN — FAMOTIDINE 20 MG: 20 TABLET, FILM COATED ORAL at 20:57

## 2017-05-16 RX ADMIN — VANCOMYCIN HYDROCHLORIDE 2000 MG: 500 INJECTION, POWDER, LYOPHILIZED, FOR SOLUTION INTRAVENOUS at 13:26

## 2017-05-16 RX ADMIN — GUAIFENESIN 600 MG: 600 TABLET, EXTENDED RELEASE ORAL at 09:16

## 2017-05-16 RX ADMIN — MORPHINE SULFATE 30 MG: 30 TABLET, EXTENDED RELEASE ORAL at 09:16

## 2017-05-16 RX ADMIN — OXYCODONE HYDROCHLORIDE AND ACETAMINOPHEN 1 TABLET: 10; 325 TABLET ORAL at 15:25

## 2017-05-16 RX ADMIN — VANCOMYCIN HYDROCHLORIDE 2000 MG: 500 INJECTION, POWDER, LYOPHILIZED, FOR SOLUTION INTRAVENOUS at 07:02

## 2017-05-16 RX ADMIN — BUPROPION HYDROCHLORIDE 150 MG: 150 TABLET, FILM COATED, EXTENDED RELEASE ORAL at 20:57

## 2017-05-16 RX ADMIN — ONDANSETRON 4 MG: 2 INJECTION INTRAMUSCULAR; INTRAVENOUS at 07:10

## 2017-05-16 RX ADMIN — DIAZEPAM 5 MG: 5 TABLET ORAL at 05:16

## 2017-05-16 RX ADMIN — METHYLPREDNISOLONE SODIUM SUCCINATE 40 MG: 40 INJECTION, POWDER, FOR SOLUTION INTRAMUSCULAR; INTRAVENOUS at 05:16

## 2017-05-16 RX ADMIN — METHYLPREDNISOLONE SODIUM SUCCINATE 40 MG: 40 INJECTION, POWDER, FOR SOLUTION INTRAMUSCULAR; INTRAVENOUS at 11:38

## 2017-05-16 RX ADMIN — METOPROLOL SUCCINATE 25 MG: 50 TABLET, EXTENDED RELEASE ORAL at 09:17

## 2017-05-16 RX ADMIN — BRIMONIDINE TARTRATE, TIMOLOL MALEATE 1 DROP: 2; 5 SOLUTION/ DROPS OPHTHALMIC at 09:22

## 2017-05-16 RX ADMIN — ENOXAPARIN SODIUM 40 MG: 40 INJECTION SUBCUTANEOUS at 16:34

## 2017-05-16 RX ADMIN — NYSTATIN 500000 UNITS: 500000 SUSPENSION ORAL at 18:32

## 2017-05-16 RX ADMIN — NYSTATIN 500000 UNITS: 500000 SUSPENSION ORAL at 11:38

## 2017-05-16 RX ADMIN — GUAIFENESIN 600 MG: 600 TABLET, EXTENDED RELEASE ORAL at 20:57

## 2017-05-16 RX ADMIN — DIAZEPAM 5 MG: 5 TABLET ORAL at 18:32

## 2017-05-16 RX ADMIN — NYSTATIN 500000 UNITS: 500000 SUSPENSION ORAL at 20:57

## 2017-05-16 RX ADMIN — NYSTATIN 500000 UNITS: 500000 SUSPENSION ORAL at 09:00

## 2017-05-16 RX ADMIN — FAMOTIDINE 20 MG: 20 TABLET, FILM COATED ORAL at 09:17

## 2017-05-16 RX ADMIN — CEFEPIME 1 G: 1 INJECTION, POWDER, FOR SOLUTION INTRAMUSCULAR; INTRAVENOUS at 16:35

## 2017-05-16 RX ADMIN — IPRATROPIUM BROMIDE AND ALBUTEROL SULFATE 3 ML: .5; 3 SOLUTION RESPIRATORY (INHALATION) at 07:13

## 2017-05-16 RX ADMIN — IPRATROPIUM BROMIDE AND ALBUTEROL SULFATE 3 ML: .5; 3 SOLUTION RESPIRATORY (INHALATION) at 13:52

## 2017-05-17 LAB — M PNEUMONIAE IGM ABS: <770 U/ML (ref 0–769)

## 2017-05-18 LAB
BACTERIA FLD CULT: NORMAL
Lab: NORMAL
ORGANISM ID: NORMAL
S PNEUM AG SPEC QL LA: NEGATIVE
SPECIMEN SOURCE: NORMAL

## 2017-05-19 ENCOUNTER — TELEPHONE (OUTPATIENT)
Dept: INTERNAL MEDICINE | Facility: CLINIC | Age: 57
End: 2017-05-19

## 2017-05-19 LAB — MRSA SPEC QL CULT: NO GROWTH

## 2017-05-30 ENCOUNTER — TELEPHONE (OUTPATIENT)
Dept: FAMILY MEDICINE CLINIC | Facility: CLINIC | Age: 57
End: 2017-05-30

## 2017-06-01 ENCOUNTER — OFFICE VISIT (OUTPATIENT)
Dept: FAMILY MEDICINE CLINIC | Facility: CLINIC | Age: 57
End: 2017-06-01

## 2017-06-01 VITALS
HEART RATE: 53 BPM | WEIGHT: 206 LBS | HEIGHT: 70 IN | SYSTOLIC BLOOD PRESSURE: 116 MMHG | DIASTOLIC BLOOD PRESSURE: 77 MMHG | TEMPERATURE: 98.4 F | OXYGEN SATURATION: 95 % | BODY MASS INDEX: 29.49 KG/M2

## 2017-06-01 DIAGNOSIS — I33.0 SUBACUTE BACTERIAL ENDOCARDITIS: Primary | ICD-10-CM

## 2017-06-01 PROCEDURE — 99213 OFFICE O/P EST LOW 20 MIN: CPT | Performed by: INTERNAL MEDICINE

## 2017-06-01 RX ORDER — ONDANSETRON 4 MG/1
4 TABLET, FILM COATED ORAL EVERY 8 HOURS PRN
Qty: 60 TABLET | Refills: 0 | Status: SHIPPED | OUTPATIENT
Start: 2017-06-01 | End: 2017-08-18

## 2017-06-01 NOTE — PROGRESS NOTES
Jack Meyer is a 56 y.o. male.     Chief Complaint   Patient presents with   • Follow-up     hospital follow up; patient is doing well; patient requesting prescription for zofran       History of Present Illness   Patient comes in for f/u s/p hospital discharge. He was readmitted with shortness of breath and was subsequently transferred to , where he underwent Aortic Valve replacement on 5/22. Patient was discharged on Daptomycin which he is on currently. He reports to feeling better, breathing well.     The following portions of the patient's history were reviewed and updated as appropriate: allergies, current medications, past family history, past medical history, past social history, past surgical history and problem list.    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     MRSA   • Gastric ulcer    • GERD (gastroesophageal reflux disease)    • Glaucoma    • Hypertension    • Lumbosacral disc disease    • Neurologic disorder    • Renal calculi    • Scoliosis    • Stroke     TIA - slurred speech, discoordinated       Past Surgical History:   Procedure Laterality Date   • ABDOMINAL SURGERY     • APPENDECTOMY     • BACK SURGERY     • EYE SURGERY     • HERNIA REPAIR  2011    ventral & hiatal w/ mesh   • LUMBAR FUSION  1999    L3-S1   • TONSILLECTOMY         Current Outpatient Prescriptions on File Prior to Visit   Medication Sig Dispense Refill   • acetaminophen (TYLENOL) 325 MG tablet Take 2 tablets by mouth Every 4 (Four) Hours As Needed for Mild Pain (1-3).  0   • aluminum-magnesium hydroxide-simethicone (MAALOX MAX) 400-400-40 MG/5ML suspension Take 10 mL by mouth Every 6 (Six) Hours As Needed for Indigestion or Heartburn.  0   • aspirin  MG tablet Take 325 mg by mouth Every 6 (Six) Hours As Needed.     • bisacodyl  (DULCOLAX) 10 MG suppository Insert 1 suppository into the rectum Daily As Needed for Constipation.  0   • brimonidine-timolol (COMBIGAN) 0.2-0.5 % ophthalmic solution 2 drops 1 (one) time. 2 drops in left eye once per day     • buPROPion SR (WELLBUTRIN SR) 150 MG 12 hr tablet Take 1 tablet by mouth 3 (Three) Times a Day. 90 tablet 5   • cetirizine (zyrTEC) 10 MG tablet Take 10 mg by mouth Daily.     • DAPTOmycin 800 mg in sodium chloride 0.9 % 50 mL Infuse 800 mg into a venous catheter Daily for 40 days. Indications: Bacteria in the Blood, Endocarditis 40 each 0   • diazePAM (VALIUM) 5 MG tablet Take 1 tablet by mouth Every 12 (Twelve) Hours. 6 tablet 0   • flunisolide (NASALIDE) 25 MCG/ACT (0.025%) solution nasal spray Inhale 1 spray Every 12 (Twelve) Hours. 1 bottle 5   • Fluticasone Furoate (ARNUITY ELLIPTA) 200 MCG/ACT aerosol powder  Inhale 1 puff Daily. Rinse mouth with water after use. 30 each 5   • lactobacillus acidophilus (RISAQUAD) capsule capsule Take 1 capsule by mouth Daily.     • methylPREDNISolone sodium succinate (SOLU-Medrol) 40 MG injection Infuse 1 mL into a venous catheter Every 8 (Eight) Hours.     • metoprolol succinate XL (TOPROL-XL) 50 MG 24 hr tablet Take 1 tablet by mouth Daily. 30 tablet 2   • nystatin (MYCOSTATIN) 146982 UNIT/ML suspension Take 5 mL by mouth 4 (Four) Times a Day.  0   • PROAIR  (90 BASE) MCG/ACT inhaler Inhale 2 puffs Every 6 (Six) Hours As Needed for wheezing or shortness of air. 1 inhaler 3   • sodium chloride 0.9 % flush Infuse 1-10 mL into a venous catheter As Needed for Line Care.     • sodium chloride 0.9 % flush Infuse 10 mL into a venous catheter As Needed for Line Care.     • [DISCONTINUED] amLODIPine-benazepril (LOTREL) 10-40 MG per capsule Take 1 capsule by mouth Daily.     • [DISCONTINUED] cefepime 1 g in sodium chloride 0.9 % 100 mL IVPB Infuse 1 g into a venous catheter Every 8 (Eight) Hours. Indications: Pneumonia  0   • [DISCONTINUED] enoxaparin  (LOVENOX) 40 MG/0.4ML solution syringe Inject 0.4 mL under the skin Daily. 11.2 mL    • [DISCONTINUED] famotidine (PEPCID) 20 MG tablet Take 1 tablet by mouth 2 (Two) Times a Day.     • [DISCONTINUED] guaiFENesin (MUCINEX) 600 MG 12 hr tablet Take 1 tablet by mouth 2 (Two) Times a Day.     • [DISCONTINUED] ipratropium-albuterol (DUONEB) 0.5-2.5 mg/mL nebulizer Take 3 mL by nebulization Every 4 (Four) Hours As Needed for Wheezing or Shortness of Air. 360 mL    • [DISCONTINUED] levoFLOXacin, LEVAQUIN, 500 mg/100 mL D5W, premix, (LEVAQUIN) 500 MG/100ML solution IVPB Infuse 100 mL into a venous catheter Daily. Indications: Pneumonia  0   • [DISCONTINUED] Morphine (MS CONTIN) 30 MG 12 hr tablet Take 1 tablet by mouth 2 (Two) Times a Day. 60 tablet 0   • [DISCONTINUED] ondansetron (ZOFRAN) 4 MG/2ML injection Infuse 2 mL into a venous catheter Every 6 (Six) Hours As Needed for Nausea or Vomiting.     • [DISCONTINUED] oxyCODONE-acetaminophen (PERCOCET)  MG per tablet Take 1/2 to 1 tab every 4-6 hours as needed 90 tablet 0   • [DISCONTINUED] vancomycin 2000 mg in sodium chloride 0.9% 500 mL IVPB Infuse 2,000 mg into a venous catheter Every 8 (Eight) Hours. Indications: Endocarditis       Current Facility-Administered Medications on File Prior to Visit   Medication Dose Route Frequency Provider Last Rate Last Dose   • Testosterone Cypionate (DEPOTESTOTERONE CYPIONATE) injection 300 mg  300 mg Intramuscular Q14 Days Angelita Lu MD   300 mg at 03/01/17 1239       Social History     Social History   • Marital status:      Spouse name: N/A   • Number of children: N/A   • Years of education: N/A     Occupational History   • Not on file.     Social History Main Topics   • Smoking status: Former Smoker     Packs/day: 0.25     Years: 20.00     Types: Cigarettes     Quit date: 8/18/2016   • Smokeless tobacco: Former User     Types: Chew   • Alcohol use 0.6 oz/week     1 Cans of beer per week      Comment: occassional  "  • Drug use: No   • Sexual activity: Yes     Partners: Female     Other Topics Concern   • Not on file     Social History Narrative    . Denies current tobacco or drug use or alcohol intake. Disabled and does not work.       Review of Systems   Constitutional: Negative for chills, fatigue and fever.   HENT: Negative for congestion, ear pain, rhinorrhea, sinus pressure and sore throat.    Eyes: Negative for visual disturbance.   Respiratory: Negative for cough, chest tightness, shortness of breath and wheezing.    Cardiovascular: Negative for chest pain, palpitations and leg swelling.   Gastrointestinal: Negative for abdominal pain, blood in stool, constipation, diarrhea, nausea and vomiting.   Endocrine: Negative for polydipsia and polyuria.   Genitourinary: Negative for dysuria and hematuria.   Musculoskeletal: Negative for back pain.   Skin: Negative for rash.   Neurological: Negative for dizziness, light-headedness, numbness and headaches.   Psychiatric/Behavioral: Negative for dysphoric mood and sleep disturbance. The patient is not nervous/anxious.        Objective   Blood pressure 116/77, pulse 53, temperature 98.4 °F (36.9 °C), height 70\" (177.8 cm), weight 206 lb (93.4 kg), SpO2 95 %.    Physical Exam   Constitutional: He is oriented to person, place, and time. He appears well-nourished. No distress.   HENT:   Head: Atraumatic.   Right Ear: External ear normal.   Left Ear: External ear normal.   Eyes: Conjunctivae are normal. Right eye exhibits no discharge. Left eye exhibits no discharge.   Cardiovascular: Normal rate and regular rhythm.    No murmur heard.  Pulmonary/Chest: Effort normal and breath sounds normal. He has no wheezes. He has no rales.   Abdominal: Soft. Bowel sounds are normal. He exhibits no distension. There is no tenderness.   Neurological: He is alert and oriented to person, place, and time.   Skin: He is not diaphoretic.   Left arm PICC line in place, clean with no discharge "   Psychiatric: He has a normal mood and affect.   Nursing note and vitals reviewed.    Assessment/Plan   Demi was seen today for follow-up.    Diagnoses and all orders for this visit:    Subacute bacterial endocarditis    Other orders  -     ondansetron (ZOFRAN) 4 MG tablet; Take 1 tablet by mouth Every 8 (Eight) Hours As Needed for Nausea or Vomiting.      Bacterial Aortic Valve MRSA endocarditis. Patient will continue 6 weeks of IV Daptomycin with end date 7/3/17.  Patient will f/u in a month.         Return in about 4 weeks (around 6/29/2017).    There are no Patient Instructions on file for this visit.

## 2017-06-06 ENCOUNTER — OFFICE VISIT (OUTPATIENT)
Dept: INTERNAL MEDICINE | Facility: CLINIC | Age: 57
End: 2017-06-06

## 2017-06-06 VITALS
OXYGEN SATURATION: 94 % | HEIGHT: 70 IN | HEART RATE: 56 BPM | SYSTOLIC BLOOD PRESSURE: 104 MMHG | BODY MASS INDEX: 29.58 KG/M2 | RESPIRATION RATE: 12 BRPM | DIASTOLIC BLOOD PRESSURE: 77 MMHG | WEIGHT: 206.6 LBS

## 2017-06-06 DIAGNOSIS — I05.9 ENDOCARDITIS OF MITRAL VALVE: Chronic | ICD-10-CM

## 2017-06-06 DIAGNOSIS — D62 ACUTE BLOOD LOSS ANEMIA: ICD-10-CM

## 2017-06-06 DIAGNOSIS — Z95.3 STATUS POST AORTIC VALVE REPLACEMENT WITH BIOPROSTHETIC VALVE: Primary | ICD-10-CM

## 2017-06-06 DIAGNOSIS — I78.8: ICD-10-CM

## 2017-06-06 DIAGNOSIS — I34.0 MITRAL VALVE REGURGITATION, INFECTIOUS: ICD-10-CM

## 2017-06-06 DIAGNOSIS — B99.9 MITRAL VALVE REGURGITATION, INFECTIOUS: ICD-10-CM

## 2017-06-06 DIAGNOSIS — I51.7 LEFT ATRIAL ENLARGEMENT: ICD-10-CM

## 2017-06-06 DIAGNOSIS — I96 WET GANGRENE (HCC): ICD-10-CM

## 2017-06-06 DIAGNOSIS — I48.0 PAROXYSMAL ATRIAL FIBRILLATION (HCC): ICD-10-CM

## 2017-06-06 PROBLEM — M75.42 IMPINGEMENT SYNDROME OF BOTH SHOULDERS: Status: ACTIVE | Noted: 2017-04-18

## 2017-06-06 PROBLEM — M75.41 IMPINGEMENT SYNDROME OF BOTH SHOULDERS: Status: ACTIVE | Noted: 2017-04-18

## 2017-06-06 PROCEDURE — 99214 OFFICE O/P EST MOD 30 MIN: CPT | Performed by: FAMILY MEDICINE

## 2017-06-06 RX ORDER — OXYCODONE HYDROCHLORIDE AND ACETAMINOPHEN 5; 325 MG/1; MG/1
TABLET ORAL EVERY 4 HOURS PRN
COMMUNITY
Start: 2017-05-28 | End: 2017-07-10

## 2017-06-06 RX ORDER — POTASSIUM CHLORIDE 20 MEQ/1
TABLET, EXTENDED RELEASE ORAL DAILY
COMMUNITY
Start: 2017-05-28 | End: 2017-06-13 | Stop reason: SDUPTHER

## 2017-06-06 RX ORDER — AMIODARONE HYDROCHLORIDE 200 MG/1
TABLET ORAL DAILY
COMMUNITY
Start: 2017-05-28 | End: 2017-11-06

## 2017-06-06 RX ORDER — FUROSEMIDE 40 MG/1
TABLET ORAL DAILY
COMMUNITY
Start: 2017-05-28 | End: 2017-07-10

## 2017-06-06 NOTE — PROGRESS NOTES
Hospital follow up after valve replacement.   He is on IV antibx via PICC line until 7-3-17. ([facility])  He is feeling ok since d/c. On O2 at home at night. 2 lpm NC.     Transitional Care Follow Up Visit  Subjective     Demi Meyer is a 56 y.o. male who presents for a transitional care management visit.    Within 48 business hours after discharge our office contacted him via telephone to coordinate his care and needs.      I reviewed and discussed the details of that call along with the discharge summary, hospital problems, inpatient lab results, inpatient diagnostic studies, and consultation reports with Demi.    No flowsheet data found.    History of Present Illness   Course During Hospital Stay:      AVR: Had open heart surgery - aortic valve replacement - 25 mm pig valve on 5/22/2017.  No blood products needed, 20 minute cold bypass perfusion.  Did well post-op, improved in exercise tolerance very quickly, pain much better than pre-op.  Needing only 1-2 low dose percocets daily.  He is on lasix daily, taking at 9 am - UK goal weight for him around 87.  He was sent home on O2, but is now weaned to just using it at night.  Intra op echo showed LA enlargement and mitral regurgitation, but aortic valve perfect and RVSP already improved.     A fib:  Post-op he was going in and out of Afib, was started on high dose amiodarone and sent home on low dose daily amiodarone, with goal of stopping it after 1 month.  He could feel himself going in and out while in the hospital, especially out as it was a bit painful.  Hasn't had the sensation since being home.        Janeway lesions: most are healed, but his right great toe is becoming painful.  No leakage from the toe, but mildly swollen.  Still on daptomycin for his endocarditis.           Review of Systems   Constitutional: Negative for chills, diaphoresis, fatigue and fever.   Respiratory: Negative for shortness of breath.    Cardiovascular: Positive for leg swelling.  "Negative for chest pain and palpitations.   Musculoskeletal: Positive for gait problem. Negative for joint swelling.   Neurological: Positive for weakness.       Objective   /77  Pulse 56  Resp 12  Ht 70\" (177.8 cm)  Wt 206 lb 9.6 oz (93.7 kg)  SpO2 94%  BMI 29.64 kg/m2    Physical Exam   Constitutional: He appears well-developed and well-nourished. No distress.   Cardiovascular: Normal rate and regular rhythm.    Murmur heard.   Decrescendo systolic murmur is present with a grade of 2/6   Murmur in L apex, no AV murmur    2+ pitting pre-tibial edema   Pulmonary/Chest: Effort normal and breath sounds normal.   Musculoskeletal:   WOUND CARE: Xeroform guaze cut out and placed on ulcerated area on toe. Non-adherant pad placed on top and toe wrapped in kerlex              Office Visit on 06/06/2017   Component Date Value Ref Range Status   • Glucose 06/07/2017 105* 74 - 98 mg/dL Final   • BUN 06/07/2017 16  7 - 20 mg/dL Final   • Creatinine 06/07/2017 0.90  0.60 - 1.30 mg/dL Final   • eGFR Non  Am 06/07/2017 88  >60 mL/min/1.73 Final    Comment: Abnormal estimated GFR should be followed by more specific  studies to confirm end stage chronic renal disease. The  equation used for calculation may not be accurate for  patients less than 19 years old, greater than 70 years old,  patients at extremes of weight, malnutrition, or with acute  renal dysfunction.     • eGFR  Am 06/07/2017 107  >60 mL/min/1.73 Final   • BUN/Creatinine Ratio 06/07/2017 17.8  6.3 - 21.9 Final   • Sodium 06/07/2017 139  137 - 145 mmol/L Final   • Potassium 06/07/2017 4.9  3.5 - 5.1 mmol/L Final   • Chloride 06/07/2017 100  98 - 107 mmol/L Final   • Total CO2 06/07/2017 28.0  26.0 - 30.0 mmol/L Final   • Calcium 06/07/2017 9.3  8.4 - 10.2 mg/dL Final   • Total Protein 06/07/2017 6.9  6.3 - 8.2 g/dL Final   • Albumin 06/07/2017 3.80  3.50 - 5.00 g/dL Final   • Globulin 06/07/2017 3.1  gm/dL Final   • A/G Ratio 06/07/2017 1.2  " 1.0 - 2.0 g/dL Final   • Total Bilirubin 06/07/2017 0.5  0.2 - 1.3 mg/dL Final   • Alkaline Phosphatase 06/07/2017 220* 38 - 126 U/L Final   • AST (SGOT) 06/07/2017 159* 15 - 46 U/L Final   • ALT (SGPT) 06/07/2017 164* 13 - 69 U/L Final   • Magnesium 06/07/2017 2.2  1.6 - 2.3 mg/dL Final   • WBC 06/07/2017 7.83  4.80 - 10.80 10*3/mm3 Final   • RBC 06/07/2017 3.89* 4.70 - 6.10 10*6/mm3 Final   • Hemoglobin 06/07/2017 11.2* 14.0 - 18.0 g/dL Final   • Hematocrit 06/07/2017 36.4* 42.0 - 52.0 % Final   • MCV 06/07/2017 93.6  80.0 - 94.0 fL Final   • MCH 06/07/2017 28.8  27.0 - 31.0 pg Final   • MCHC 06/07/2017 30.8  30.0 - 37.0 g/dL Final   • RDW 06/07/2017 15.0* 11.5 - 14.5 % Final   • Platelets 06/07/2017 502* 130 - 400 10*3/mm3 Final   • Neutrophil Rel % 06/07/2017 50.6  37.0 - 80.0 % Final   • Lymphocyte Rel % 06/07/2017 32.4  10.0 - 50.0 % Final   • Monocyte Rel % 06/07/2017 11.0  0.0 - 12.0 % Final   • Eosinophil Rel % 06/07/2017 2.9  0.0 - 7.0 % Final   • Basophil Rel % 06/07/2017 0.8  0.0 - 2.5 % Final   • Neutrophils Absolute 06/07/2017 3.96  2.00 - 6.90 10*3/mm3 Final   • Lymphocytes Absolute 06/07/2017 2.54  0.60 - 3.40 10*3/mm3 Final   • Monocytes Absolute 06/07/2017 0.86  0.00 - 0.90 10*3/mm3 Final   • Eosinophils Absolute 06/07/2017 0.23  0.00 - 0.70 10*3/mm3 Final   • Basophils Absolute 06/07/2017 0.06  0.00 - 0.20 10*3/mm3 Final   • Immature Granulocyte Rel % 06/07/2017 2.3* 0.0 - 0.6 % Final   • Immature Grans Absolute 06/07/2017 0.18* 0.00 - 0.06 10*3/mm3 Final   • nRBC 06/07/2017 0.0  0.0 - 0.0 /100 WBC Final   ]    Assessment/Plan   1. Status post aortic valve replacement with bioprosthetic valve  Much improved - will send lab results to CT surg, appt w/ them on 5/19  - Comprehensive Metabolic Panel  - Magnesium  - BNP  - CBC & Differential  - Ambulatory Referral to Podiatry    2. Paroxysmal atrial fibrillation  RRR today, cannot stay on amiodarone past one month, has lung disease already and cannot  risk fibrosis side effect. Will need to keep as dry as possible - advised taking lasix in afternoon instead of morning, should be more effective.  May have to increase if can't get this last 10 pounds weight off.   - Comprehensive Metabolic Panel  - Magnesium  - BNP  - CBC & Differential    3. Mitral valve regurgitation, infectious  Murmur now very light, should become mild-moderate as MV heals  - Comprehensive Metabolic Panel  - Magnesium  - BNP  - CBC & Differential    4. Wet gangrene  Not quite accurate dx but ICD 10 does not have Janeway lesions - not infected, but not healing appropriately either. Will need some level of debridement, wound care, ESSENTIAL to prevent osteomyelitis or re-infection of MV.    - Ambulatory Referral to Podiatry    5. Capillary thrombosis  Janeway lesion  - Ambulatory Referral to Podiatry    6. Acute blood loss anemia    - Comprehensive Metabolic Panel  - Magnesium  - BNP  - CBC & Differential    7. Left atrial enlargement    - Comprehensive Metabolic Panel  - Magnesium  - BNP  - CBC & Differential    8. Endocarditis of mitral valve    - Ambulatory Referral to Podiatry

## 2017-06-08 LAB
ALBUMIN SERPL-MCNC: 3.8 G/DL (ref 3.5–5)
ALBUMIN/GLOB SERPL: 1.2 G/DL (ref 1–2)
ALP SERPL-CCNC: 220 U/L (ref 38–126)
ALT SERPL-CCNC: 164 U/L (ref 13–69)
AST SERPL-CCNC: 159 U/L (ref 15–46)
BASOPHILS # BLD AUTO: 0.06 10*3/MM3 (ref 0–0.2)
BASOPHILS NFR BLD AUTO: 0.8 % (ref 0–2.5)
BILIRUB SERPL-MCNC: 0.5 MG/DL (ref 0.2–1.3)
BNP SERPL-MCNC: 266.4 PG/ML (ref 0–100)
BUN SERPL-MCNC: 16 MG/DL (ref 7–20)
BUN/CREAT SERPL: 17.8 (ref 6.3–21.9)
CALCIUM SERPL-MCNC: 9.3 MG/DL (ref 8.4–10.2)
CHLORIDE SERPL-SCNC: 100 MMOL/L (ref 98–107)
CO2 SERPL-SCNC: 28 MMOL/L (ref 26–30)
CREAT SERPL-MCNC: 0.9 MG/DL (ref 0.6–1.3)
EOSINOPHIL # BLD AUTO: 0.23 10*3/MM3 (ref 0–0.7)
EOSINOPHIL NFR BLD AUTO: 2.9 % (ref 0–7)
ERYTHROCYTE [DISTWIDTH] IN BLOOD BY AUTOMATED COUNT: 15 % (ref 11.5–14.5)
GLOBULIN SER CALC-MCNC: 3.1 GM/DL
GLUCOSE SERPL-MCNC: 105 MG/DL (ref 74–98)
HCT VFR BLD AUTO: 36.4 % (ref 42–52)
HGB BLD-MCNC: 11.2 G/DL (ref 14–18)
IMM GRANULOCYTES # BLD: 0.18 10*3/MM3 (ref 0–0.06)
IMM GRANULOCYTES NFR BLD: 2.3 % (ref 0–0.6)
LYMPHOCYTES # BLD AUTO: 2.54 10*3/MM3 (ref 0.6–3.4)
LYMPHOCYTES NFR BLD AUTO: 32.4 % (ref 10–50)
MAGNESIUM SERPL-MCNC: 2.2 MG/DL (ref 1.6–2.3)
MCH RBC QN AUTO: 28.8 PG (ref 27–31)
MCHC RBC AUTO-ENTMCNC: 30.8 G/DL (ref 30–37)
MCV RBC AUTO: 93.6 FL (ref 80–94)
MONOCYTES # BLD AUTO: 0.86 10*3/MM3 (ref 0–0.9)
MONOCYTES NFR BLD AUTO: 11 % (ref 0–12)
NEUTROPHILS # BLD AUTO: 3.96 10*3/MM3 (ref 2–6.9)
NEUTROPHILS NFR BLD AUTO: 50.6 % (ref 37–80)
NRBC BLD AUTO-RTO: 0 /100 WBC (ref 0–0)
PLATELET # BLD AUTO: 502 10*3/MM3 (ref 130–400)
POTASSIUM SERPL-SCNC: 4.9 MMOL/L (ref 3.5–5.1)
PROT SERPL-MCNC: 6.9 G/DL (ref 6.3–8.2)
RBC # BLD AUTO: 3.89 10*6/MM3 (ref 4.7–6.1)
SODIUM SERPL-SCNC: 139 MMOL/L (ref 137–145)
WBC # BLD AUTO: 7.83 10*3/MM3 (ref 4.8–10.8)

## 2017-06-13 ENCOUNTER — OFFICE VISIT (OUTPATIENT)
Dept: INTERNAL MEDICINE | Facility: CLINIC | Age: 57
End: 2017-06-13

## 2017-06-13 VITALS
BODY MASS INDEX: 29.72 KG/M2 | SYSTOLIC BLOOD PRESSURE: 130 MMHG | RESPIRATION RATE: 12 BRPM | DIASTOLIC BLOOD PRESSURE: 80 MMHG | HEART RATE: 55 BPM | WEIGHT: 207.6 LBS | HEIGHT: 70 IN | OXYGEN SATURATION: 97 %

## 2017-06-13 DIAGNOSIS — I10 ESSENTIAL HYPERTENSION: ICD-10-CM

## 2017-06-13 DIAGNOSIS — R79.89 ELEVATED LFTS: ICD-10-CM

## 2017-06-13 DIAGNOSIS — Z95.3 STATUS POST AORTIC VALVE REPLACEMENT WITH BIOPROSTHETIC VALVE: Primary | ICD-10-CM

## 2017-06-13 DIAGNOSIS — I78.8: ICD-10-CM

## 2017-06-13 DIAGNOSIS — I48.0 PAROXYSMAL ATRIAL FIBRILLATION (HCC): ICD-10-CM

## 2017-06-13 PROCEDURE — 99213 OFFICE O/P EST LOW 20 MIN: CPT | Performed by: FAMILY MEDICINE

## 2017-06-13 RX ORDER — BENAZEPRIL HYDROCHLORIDE 20 MG/1
20 TABLET ORAL DAILY
Qty: 30 TABLET | Refills: 1 | Status: SHIPPED | OUTPATIENT
Start: 2017-06-13 | End: 2017-07-10

## 2017-06-13 RX ORDER — POTASSIUM CHLORIDE 20 MEQ/1
20 TABLET, EXTENDED RELEASE ORAL DAILY PRN
Qty: 60 TABLET | Refills: 1 | Status: SHIPPED | OUTPATIENT
Start: 2017-06-13 | End: 2017-11-30 | Stop reason: SDUPTHER

## 2017-06-13 NOTE — PROGRESS NOTES
"One week follow up. He saw Podiatry Friday for right great toe. It was debrided, feels much better per pt. He has not started walking yet, middle of wound still a little sore.   Last night was the first night he did not use O2. Woke up with O2 @ 96%.  Go over lab results. He is still on Furosemide 40 mg qd.  Has appt with CT surgeon on 6-23-17.   Needs refill K+ 20 MEQ. Discuss if he needs to stay on it depending on lab result.     SUBJECTIVE: Demi Meyer is a 56 y.o. male seen for a follow up visit;    S/p AVR: energy improving, stable.  Not using O2 at night now.  No chest pain or SOB.      A fib: stable, no palpitations.  No dizziness or chest pain.  Mild edema in LE.     Toe pain: stable, not walking a lot.  Saw podiatry.  They aren't concerned about any infection.  He finishes abx first week of July.         The following portions of the patient's history were reviewed and updated as appropriate: current medications, past surgical history and problem list.    Review of Systems   Constitutional: Negative.    Respiratory: Negative.    Cardiovascular: Negative.    Musculoskeletal: Negative for joint swelling and myalgias.   Neurological: Negative.          OBJECTIVE:  /80  Pulse 55  Resp 12  Ht 70\" (177.8 cm)  Wt 207 lb 9.6 oz (94.2 kg)  SpO2 97%  BMI 29.79 kg/m2     Physical Exam   Constitutional: He appears well-developed and well-nourished. No distress.           ASSESSMENT:  1. Status post aortic valve replacement with bioprosthetic valve  Improving, continue lasix, recheck BNP  - potassium chloride (K-DUR,KLOR-CON) 20 MEQ CR tablet; Take 1 tablet by mouth Daily As Needed (with lasix).  Dispense: 60 tablet; Refill: 1  - Comprehensive Metabolic Panel  - BNP    2. Paroxysmal atrial fibrillation  stable  - potassium chloride (K-DUR,KLOR-CON) 20 MEQ CR tablet; Take 1 tablet by mouth Daily As Needed (with lasix).  Dispense: 60 tablet; Refill: 1  - Comprehensive Metabolic Panel  - BNP    3. Elevated " LFTs  recheck  - Comprehensive Metabolic Panel  - BNP    4. Capillary thrombosis  Of toe, seeing podiatry, may need foot MRI prior to d/c of abx    5. Essential hypertension  Worsening, will restart ace  - benazepril (LOTENSIN) 20 MG tablet; Take 1 tablet by mouth Daily.  Dispense: 30 tablet; Refill: 1      I, Angelita Lu MD, hereby attest that the medical record entry above accurately reflects signatures/notations that I made in my capacity as Angelita Lu MD when I treated and diagnosed the above patient.  I do hereby attest that his information is true, accurate, and complete to the best of my knowledge and I understand that any falsification, omission, or concealment of material fact may subject me to administrative, civil, or criminal liability.

## 2017-06-14 ENCOUNTER — TELEPHONE (OUTPATIENT)
Dept: INTERNAL MEDICINE | Facility: CLINIC | Age: 57
End: 2017-06-14

## 2017-06-14 LAB
ALBUMIN SERPL-MCNC: 4.2 G/DL (ref 3.5–5)
ALBUMIN/GLOB SERPL: 1.3 G/DL (ref 1–2)
ALP SERPL-CCNC: 137 U/L (ref 38–126)
ALT SERPL-CCNC: 45 U/L (ref 13–69)
AST SERPL-CCNC: 27 U/L (ref 15–46)
BILIRUB SERPL-MCNC: 0.4 MG/DL (ref 0.2–1.3)
BNP SERPL-MCNC: 112.9 PG/ML (ref 0–100)
BUN SERPL-MCNC: 21 MG/DL (ref 7–20)
BUN/CREAT SERPL: 19.1 (ref 6.3–21.9)
CALCIUM SERPL-MCNC: 9.5 MG/DL (ref 8.4–10.2)
CHLORIDE SERPL-SCNC: 100 MMOL/L (ref 98–107)
CO2 SERPL-SCNC: 26 MMOL/L (ref 26–30)
CREAT SERPL-MCNC: 1.1 MG/DL (ref 0.6–1.3)
GLOBULIN SER CALC-MCNC: 3.2 GM/DL
GLUCOSE SERPL-MCNC: 125 MG/DL (ref 74–98)
POTASSIUM SERPL-SCNC: 4.2 MMOL/L (ref 3.5–5.1)
PROT SERPL-MCNC: 7.4 G/DL (ref 6.3–8.2)
SODIUM SERPL-SCNC: 140 MMOL/L (ref 137–145)

## 2017-06-14 NOTE — TELEPHONE ENCOUNTER
----- Message from Devi Fox MA sent at 6/14/2017  5:18 PM EDT -----  Regarding: FW: Prescription Question  Contact: [phone]   Please advise.    ----- Message -----     From: Demi Meyer     Sent: 6/14/2017   8:23 AM       To: Earlene Garcia [facility]  Subject: Prescription Question                            Message is for Dr. Lu:    This is Demi Meyer, slightly more confused than normal.  I picked up Benazepril 20 mg tablets at the pharmacy with instructions to take one a day.  However, I was wondering which of my medications this replaces??    Also not sure, based on 6/13 lab results, whether to take Lasix once or twice a day.  I guess I'm simply asking what medications I should take and how often today through my return visit  next Wednesday, 6/21/17??    I forgot to listen to you and didn't bring my failsafe, Joan with me.    Thanks

## 2017-06-20 ENCOUNTER — TELEPHONE (OUTPATIENT)
Dept: INTERNAL MEDICINE | Facility: CLINIC | Age: 57
End: 2017-06-20

## 2017-06-20 NOTE — TELEPHONE ENCOUNTER
Misa from [facility] called. She saw pt yesterday morning. Heart rate was low, 44/45, irregular. He was c/o fluttering in chest. On Metoprolol 25 mg qd. She was wondering if this needed to be changed. They called Dr. Parrish's office but he has not est care with him yet.     She also wanted to know if we were pulling his PICC line or if they were? His last antibx dose is 7-1-17. She advised they would pull it after that dose if you wanted. She was just wondering b/c we were doing labs.

## 2017-06-20 NOTE — TELEPHONE ENCOUNTER
Per Dr. Lu, take 1/2 Metoprolol. Spoke with pt, he has already taken it, takes meds early. He doesn't really fel worse, hasn't been able to walk because of toe, behind doing rehab because of this. He thinks he's ready to start walking, will discuss at appt tomorrow.     He lost his voice since hospital. Sometimes he can talk loud, sometimes he can't yell at all. No pain or anything.

## 2017-06-21 ENCOUNTER — OFFICE VISIT (OUTPATIENT)
Dept: INTERNAL MEDICINE | Facility: CLINIC | Age: 57
End: 2017-06-21

## 2017-06-21 VITALS
HEIGHT: 70 IN | DIASTOLIC BLOOD PRESSURE: 70 MMHG | WEIGHT: 209.4 LBS | SYSTOLIC BLOOD PRESSURE: 102 MMHG | BODY MASS INDEX: 29.98 KG/M2 | HEART RATE: 44 BPM | RESPIRATION RATE: 12 BRPM | OXYGEN SATURATION: 98 %

## 2017-06-21 DIAGNOSIS — I48.0 PAROXYSMAL ATRIAL FIBRILLATION (HCC): Primary | ICD-10-CM

## 2017-06-21 DIAGNOSIS — I78.8: ICD-10-CM

## 2017-06-21 PROCEDURE — 93225 XTRNL ECG REC<48 HRS REC: CPT | Performed by: FAMILY MEDICINE

## 2017-06-21 PROCEDURE — 99213 OFFICE O/P EST LOW 20 MIN: CPT | Performed by: FAMILY MEDICINE

## 2017-06-21 RX ORDER — DIAZEPAM 5 MG/1
TABLET ORAL
COMMUNITY
Start: 2017-06-19

## 2017-06-22 LAB
BNP SERPL-MCNC: 131.8 PG/ML (ref 0–100)
BUN SERPL-MCNC: 13 MG/DL (ref 7–20)
BUN/CREAT SERPL: 14.4 (ref 6.3–21.9)
CALCIUM SERPL-MCNC: 9.7 MG/DL (ref 8.4–10.2)
CHLORIDE SERPL-SCNC: 100 MMOL/L (ref 98–107)
CO2 SERPL-SCNC: 28 MMOL/L (ref 26–30)
CREAT SERPL-MCNC: 0.9 MG/DL (ref 0.6–1.3)
GLUCOSE SERPL-MCNC: 106 MG/DL (ref 74–98)
POTASSIUM SERPL-SCNC: 5.1 MMOL/L (ref 3.5–5.1)
SODIUM SERPL-SCNC: 142 MMOL/L (ref 137–145)

## 2017-06-23 ENCOUNTER — CLINICAL SUPPORT (OUTPATIENT)
Dept: INTERNAL MEDICINE | Facility: CLINIC | Age: 57
End: 2017-06-23

## 2017-06-23 DIAGNOSIS — I48.0 PAROXYSMAL ATRIAL FIBRILLATION (HCC): ICD-10-CM

## 2017-06-23 PROCEDURE — 93227 XTRNL ECG REC<48 HR R&I: CPT | Performed by: FAMILY MEDICINE

## 2017-07-06 DIAGNOSIS — F41.9 ANXIETY: ICD-10-CM

## 2017-07-06 RX ORDER — BUPROPION HYDROCHLORIDE 150 MG/1
TABLET, EXTENDED RELEASE ORAL
Qty: 90 TABLET | Refills: 5 | Status: SHIPPED | OUTPATIENT
Start: 2017-07-06 | End: 2018-01-08 | Stop reason: SDUPTHER

## 2017-07-10 ENCOUNTER — OFFICE VISIT (OUTPATIENT)
Dept: FAMILY MEDICINE CLINIC | Facility: CLINIC | Age: 57
End: 2017-07-10

## 2017-07-10 VITALS
TEMPERATURE: 98.4 F | HEIGHT: 70 IN | HEART RATE: 55 BPM | BODY MASS INDEX: 30.78 KG/M2 | WEIGHT: 215 LBS | DIASTOLIC BLOOD PRESSURE: 91 MMHG | OXYGEN SATURATION: 95 % | SYSTOLIC BLOOD PRESSURE: 131 MMHG

## 2017-07-10 DIAGNOSIS — I35.8 ENDOCARDITIS OF AORTIC VALVE: Primary | ICD-10-CM

## 2017-07-10 PROCEDURE — 99213 OFFICE O/P EST LOW 20 MIN: CPT | Performed by: INTERNAL MEDICINE

## 2017-07-10 RX ORDER — AMLODIPINE BESYLATE AND BENAZEPRIL HYDROCHLORIDE 10; 40 MG/1; MG/1
1 CAPSULE ORAL DAILY
COMMUNITY
End: 2018-01-08 | Stop reason: ALTCHOICE

## 2017-07-10 RX ORDER — CARVEDILOL 12.5 MG/1
12.5 TABLET ORAL 2 TIMES DAILY WITH MEALS
COMMUNITY
End: 2018-01-08 | Stop reason: SDUPTHER

## 2017-07-10 RX ORDER — ONDANSETRON 4 MG/1
4 TABLET, FILM COATED ORAL EVERY 8 HOURS PRN
Qty: 60 TABLET | Refills: 0 | Status: CANCELLED | OUTPATIENT
Start: 2017-07-10

## 2017-07-10 RX ORDER — ASPIRIN 81 MG/1
81 TABLET ORAL DAILY
COMMUNITY

## 2017-07-10 RX ORDER — SPIRONOLACTONE 25 MG/1
25 TABLET ORAL DAILY
COMMUNITY
End: 2018-01-08 | Stop reason: SDUPTHER

## 2017-07-10 NOTE — PROGRESS NOTES
Subjective   Demi Meyer is a 56 y.o. male.     Chief Complaint   Patient presents with   • Follow-up     skin lesion       History of Present Illness   Patient is here for f/u. 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  He feels better. PICC line was removed on 7/3.    The following portions of the patient's history were reviewed and updated as appropriate: allergies, current medications, past family history, past medical history, past social history, past surgical history and problem list.    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     MRSA   • Gastric ulcer    • GERD (gastroesophageal reflux disease)    • Glaucoma    • Hypertension    • Lumbosacral disc disease    • Neurologic disorder    • Renal calculi    • Scoliosis    • Stroke     TIA - slurred speech, discoordinated       Past Surgical History:   Procedure Laterality Date   • ABDOMINAL SURGERY     • APPENDECTOMY     • BACK SURGERY     • EYE SURGERY     • HERNIA REPAIR  2011    ventral & hiatal w/ mesh   • LUMBAR FUSION  1999    L3-S1   • TONSILLECTOMY         Current Outpatient Prescriptions on File Prior to Visit   Medication Sig Dispense Refill   • aluminum-magnesium hydroxide-simethicone (MAALOX MAX) 400-400-40 MG/5ML suspension Take 10 mL by mouth Every 6 (Six) Hours As Needed for Indigestion or Heartburn.  0   • amiodarone (PACERONE) 200 MG tablet Daily. for one month     • brimonidine-timolol (COMBIGAN) 0.2-0.5 % ophthalmic solution 2 drops 1 (one) time. 2 drops in left eye once per day     • buPROPion SR (WELLBUTRIN SR) 150 MG 12 hr tablet TAKE ONE TABLET BY MOUTH THREE TIMES A DAY 90 tablet 5   • cetirizine (zyrTEC) 10 MG tablet Take 10 mg by mouth Daily.     • diazePAM (VALIUM) 5 MG tablet      • flunisolide  (NASALIDE) 25 MCG/ACT (0.025%) solution nasal spray Inhale 1 spray Every 12 (Twelve) Hours. 1 bottle 5   • Fluticasone Furoate (ARNUITY ELLIPTA) 200 MCG/ACT aerosol powder  Inhale 1 puff Daily. Rinse mouth with water after use. 30 each 5   • ondansetron (ZOFRAN) 4 MG tablet Take 1 tablet by mouth Every 8 (Eight) Hours As Needed for Nausea or Vomiting. 60 tablet 0   • potassium chloride (K-DUR,KLOR-CON) 20 MEQ CR tablet Take 1 tablet by mouth Daily As Needed (with lasix). 60 tablet 1   • [DISCONTINUED] benazepril (LOTENSIN) 20 MG tablet Take 1 tablet by mouth Daily. 30 tablet 1   • [DISCONTINUED] bisacodyl (DULCOLAX) 10 MG suppository Insert 1 suppository into the rectum Daily As Needed for Constipation.  0   • [DISCONTINUED] Docusate Calcium (STOOL SOFTENER PO) Take  by mouth As Needed.     • [DISCONTINUED] furosemide (LASIX) 40 MG tablet Daily.     • [DISCONTINUED] lactobacillus acidophilus (RISAQUAD) capsule capsule Take 1 capsule by mouth Daily.     • [DISCONTINUED] metoprolol tartrate (LOPRESSOR) 25 MG tablet 12.5 mg 2 (Two) Times a Day.     • [DISCONTINUED] oxyCODONE-acetaminophen (PERCOCET) 5-325 MG per tablet Every 4 (Four) Hours As Needed.     • [DISCONTINUED] PROAIR  (90 BASE) MCG/ACT inhaler Inhale 2 puffs Every 6 (Six) Hours As Needed for wheezing or shortness of air. 1 inhaler 3     Current Facility-Administered Medications on File Prior to Visit   Medication Dose Route Frequency Provider Last Rate Last Dose   • Testosterone Cypionate (DEPOTESTOTERONE CYPIONATE) injection 300 mg  300 mg Intramuscular Q14 Days Angelita Lu MD   300 mg at 03/01/17 1239       Social History     Social History   • Marital status:      Spouse name: N/A   • Number of children: N/A   • Years of education: N/A     Occupational History   • Not on file.     Social History Main Topics   • Smoking status: Former Smoker     Packs/day: 0.25     Years: 20.00     Types: Cigarettes     Quit date: 8/18/2016   • Smokeless  "tobacco: Former User     Types: Chew   • Alcohol use 0.6 oz/week     1 Cans of beer per week      Comment: occassional   • Drug use: No   • Sexual activity: Yes     Partners: Female     Other Topics Concern   • Not on file     Social History Narrative    . Denies current tobacco or drug use or alcohol intake. Disabled and does not work.       Review of Systems   Constitutional: Negative for chills, fatigue and fever.   HENT: Negative for congestion, ear pain, rhinorrhea, sinus pressure and sore throat.    Eyes: Negative for visual disturbance.   Respiratory: Negative for cough, chest tightness, shortness of breath and wheezing.    Cardiovascular: Negative for chest pain, palpitations and leg swelling.   Gastrointestinal: Negative for abdominal pain, blood in stool, constipation, diarrhea, nausea and vomiting.   Endocrine: Negative for polydipsia and polyuria.   Genitourinary: Negative for dysuria and hematuria.   Musculoskeletal: Negative for back pain.   Skin: Negative for rash.   Neurological: Negative for dizziness, light-headedness, numbness and headaches.   Psychiatric/Behavioral: Negative for dysphoric mood and sleep disturbance. The patient is not nervous/anxious.        Objective   Blood pressure 131/91, pulse 55, temperature 98.4 °F (36.9 °C), height 70\" (177.8 cm), weight 215 lb (97.5 kg), SpO2 95 %.    Physical Exam   Constitutional: He is oriented to person, place, and time. He appears well-nourished. No distress.   HENT:   Head: Atraumatic.   Right Ear: External ear normal.   Left Ear: External ear normal.   Eyes: Conjunctivae are normal. Right eye exhibits no discharge. Left eye exhibits no discharge.   Cardiovascular: Normal rate and regular rhythm.    No murmur heard.  Pulmonary/Chest: Effort normal and breath sounds normal. He has no wheezes. He has no rales.   Abdominal: Soft. Bowel sounds are normal. He exhibits no distension. There is no tenderness.   Neurological: He is alert and oriented " to person, place, and time.   Skin: No rash noted. He is not diaphoretic.   Psychiatric: He has a normal mood and affect.   Nursing note and vitals reviewed.    Assessment/Plan   Demi was seen today for follow-up.    Diagnoses and all orders for this visit:    Endocarditis of aortic valve    Other orders  -     ondansetron (ZOFRAN) 4 MG tablet; Take 1 tablet by mouth Every 8 (Eight) Hours As Needed for Nausea or Vomiting.        S/P completion of IV antibiotic therapy ( 6 weeks) with good clinical response.  Patient to f/u cardiology as scheduled.  F/U with ID as needed,         Return if symptoms worsen or fail to improve.    There are no Patient Instructions on file for this visit.

## 2017-07-12 ENCOUNTER — OUTSIDE FACILITY SERVICE (OUTPATIENT)
Dept: FAMILY MEDICINE CLINIC | Facility: CLINIC | Age: 57
End: 2017-07-12

## 2017-07-12 PROCEDURE — G0179 MD RECERTIFICATION HHA PT: HCPCS | Performed by: INTERNAL MEDICINE

## 2017-07-14 ENCOUNTER — TRANSCRIBE ORDERS (OUTPATIENT)
Dept: LAB | Facility: HOSPITAL | Age: 57
End: 2017-07-14

## 2017-07-14 ENCOUNTER — APPOINTMENT (OUTPATIENT)
Dept: LAB | Facility: HOSPITAL | Age: 57
End: 2017-07-14

## 2017-07-14 DIAGNOSIS — R79.82 CRP ELEVATED: Primary | ICD-10-CM

## 2017-07-14 LAB
ANION GAP SERPL CALCULATED.3IONS-SCNC: 9 MMOL/L (ref 3–11)
BUN BLD-MCNC: 22 MG/DL (ref 9–23)
BUN/CREAT SERPL: 24.4 (ref 7–25)
CALCIUM SPEC-SCNC: 9.8 MG/DL (ref 8.7–10.4)
CHLORIDE SERPL-SCNC: 104 MMOL/L (ref 99–109)
CO2 SERPL-SCNC: 23 MMOL/L (ref 20–31)
CREAT BLD-MCNC: 0.9 MG/DL (ref 0.6–1.3)
GFR SERPL CREATININE-BSD FRML MDRD: 88 ML/MIN/1.73
GLUCOSE BLD-MCNC: 125 MG/DL (ref 70–100)
POTASSIUM BLD-SCNC: 4.3 MMOL/L (ref 3.5–5.5)
SODIUM BLD-SCNC: 136 MMOL/L (ref 132–146)

## 2017-07-14 PROCEDURE — 80048 BASIC METABOLIC PNL TOTAL CA: CPT | Performed by: INTERNAL MEDICINE

## 2017-07-14 PROCEDURE — 36415 COLL VENOUS BLD VENIPUNCTURE: CPT | Performed by: INTERNAL MEDICINE

## 2017-08-18 ENCOUNTER — OFFICE VISIT (OUTPATIENT)
Dept: INTERNAL MEDICINE | Facility: CLINIC | Age: 57
End: 2017-08-18

## 2017-08-18 VITALS
DIASTOLIC BLOOD PRESSURE: 68 MMHG | HEART RATE: 60 BPM | RESPIRATION RATE: 16 BRPM | HEIGHT: 70 IN | BODY MASS INDEX: 31.67 KG/M2 | OXYGEN SATURATION: 98 % | WEIGHT: 221.19 LBS | TEMPERATURE: 96.9 F | SYSTOLIC BLOOD PRESSURE: 110 MMHG

## 2017-08-18 DIAGNOSIS — R21 RASH, SKIN: ICD-10-CM

## 2017-08-18 DIAGNOSIS — R23.3 ABNORMAL BRUISING: Primary | ICD-10-CM

## 2017-08-18 LAB
BASOPHILS # BLD AUTO: 0.04 10*3/MM3 (ref 0–0.2)
BASOPHILS NFR BLD AUTO: 0.5 % (ref 0–2.5)
EOSINOPHIL # BLD AUTO: 0.12 10*3/MM3 (ref 0–0.7)
EOSINOPHIL NFR BLD AUTO: 1.5 % (ref 0–7)
ERYTHROCYTE [DISTWIDTH] IN BLOOD BY AUTOMATED COUNT: 16.3 % (ref 11.5–14.5)
HCT VFR BLD AUTO: 48.9 % (ref 42–52)
HGB BLD-MCNC: 15.4 G/DL (ref 14–18)
IMM GRANULOCYTES # BLD: 0.05 10*3/MM3 (ref 0–0.06)
IMM GRANULOCYTES NFR BLD: 0.6 % (ref 0–0.6)
LYMPHOCYTES # BLD AUTO: 2.45 10*3/MM3 (ref 0.6–3.4)
LYMPHOCYTES NFR BLD AUTO: 29.7 % (ref 10–50)
MCH RBC QN AUTO: 28.1 PG (ref 27–31)
MCHC RBC AUTO-ENTMCNC: 31.5 G/DL (ref 30–37)
MCV RBC AUTO: 89.1 FL (ref 80–94)
MONOCYTES # BLD AUTO: 0.8 10*3/MM3 (ref 0–0.9)
MONOCYTES NFR BLD AUTO: 9.7 % (ref 0–12)
NEUTROPHILS # BLD AUTO: 4.8 10*3/MM3 (ref 2–6.9)
NEUTROPHILS NFR BLD AUTO: 58 % (ref 37–80)
NRBC BLD AUTO-RTO: 0 /100 WBC (ref 0–0)
PLATELET # BLD AUTO: 271 10*3/MM3 (ref 130–400)
RBC # BLD AUTO: 5.49 10*6/MM3 (ref 4.7–6.1)
WBC # BLD AUTO: 8.26 10*3/MM3 (ref 4.8–10.8)

## 2017-08-18 PROCEDURE — 99213 OFFICE O/P EST LOW 20 MIN: CPT | Performed by: NURSE PRACTITIONER

## 2017-08-18 NOTE — PROGRESS NOTES
Chief Complaint / Reason:      Chief Complaint   Patient presents with   • Rash     to left arm.        Subjective     HPI  Patient presents today with complaints of rash to left arm. He denies any injury or trauma, no changes with medications, foods, or household items. He does have bruising noted about the rash area and a bruise on right arm. He does not Remember injuring or hitting those areas.  He denies numbness or tingling.  He does take 81 mg of aspirin daily and has not noticed any abnormal bleeding.  He states that he has always had unexplained bruising.  He has a history of MRSA but denies fever or drainage from area. Patient denies itching or burning. He states that his wife wanted him to come in and make sure everything was okay. Patient states that his family had some skin issues such as being allergic to the sun and various other issues that were unexplained.   History taken from: patient    PMH/FH/Social History were reviewed and updated appropriately in the electronic medical record.     Review of Systems:   Review of Systems   Constitutional: Negative for fatigue, fever and unexpected weight change.   Respiratory: Negative.    Cardiovascular: Negative.    Gastrointestinal: Negative.    Skin: Positive for rash.   Hematological: Negative for adenopathy. Bruises/bleeds easily.     All other systems were reviewed and are negative.  Exceptions are noted in the subjective or above.      Objective     Vital Signs  Vitals:    08/18/17 0811   BP: 110/68   Pulse: 60   Resp: 16   Temp: 96.9 °F (36.1 °C)   SpO2: 98%       Body mass index is 31.74 kg/(m^2).    Physical Exam   Constitutional: He is oriented to person, place, and time. He appears well-developed and well-nourished. No distress.   HENT:   Head: Normocephalic and atraumatic.   Right Ear: External ear normal.   Left Ear: External ear normal.   Cardiovascular: Normal rate and regular rhythm.    No murmur heard.  Pulmonary/Chest: Effort normal and  breath sounds normal. He has no wheezes. He has no rales.   Abdominal: Soft. Bowel sounds are normal. He exhibits no distension. There is no tenderness.   Neurological: He is alert and oriented to person, place, and time.   Skin: Skin is warm and dry. Rash noted. He is not diaphoretic.   Rash noted on left arm along with some minimal bruising. Purplish red discoloration areas dispersed along forearm. Some areas are raised while others are flushed with skin.     Psychiatric: He has a normal mood and affect.   Nursing note and vitals reviewed.       Results Review:    I reviewed the patient's previous clinical results.     Medication Review:     Current Outpatient Prescriptions:   •  amiodarone (PACERONE) 200 MG tablet, Daily. for one month, Disp: , Rfl:   •  amLODIPine-benazepril (LOTREL) 10-40 MG per capsule, Take 1 capsule by mouth Daily., Disp: , Rfl:   •  aspirin 81 MG EC tablet, Take 81 mg by mouth Daily., Disp: , Rfl:   •  brimonidine-timolol (COMBIGAN) 0.2-0.5 % ophthalmic solution, 2 drops 1 (one) time. 2 drops in left eye once per day, Disp: , Rfl:   •  buPROPion SR (WELLBUTRIN SR) 150 MG 12 hr tablet, TAKE ONE TABLET BY MOUTH THREE TIMES A DAY, Disp: 90 tablet, Rfl: 5  •  carvedilol (COREG) 12.5 MG tablet, Take 12.5 mg by mouth 2 (Two) Times a Day With Meals. Taking 1/2 tab. bid, Disp: , Rfl:   •  cetirizine (zyrTEC) 10 MG tablet, Take 10 mg by mouth Daily., Disp: , Rfl:   •  diazePAM (VALIUM) 5 MG tablet, , Disp: , Rfl:   •  flunisolide (NASALIDE) 25 MCG/ACT (0.025%) solution nasal spray, Inhale 1 spray Every 12 (Twelve) Hours., Disp: 1 bottle, Rfl: 5  •  Fluticasone Furoate (ARNUITY ELLIPTA) 200 MCG/ACT aerosol powder , Inhale 1 puff Daily. Rinse mouth with water after use., Disp: 30 each, Rfl: 5  •  potassium chloride (K-DUR,KLOR-CON) 20 MEQ CR tablet, Take 1 tablet by mouth Daily As Needed (with lasix). (Patient taking differently: Take 20 mEq by mouth Daily As Needed (with lasix). Taking daily), Disp:  60 tablet, Rfl: 1  •  spironolactone (ALDACTONE) 25 MG tablet, Take 25 mg by mouth Daily., Disp: , Rfl:     Current Facility-Administered Medications:   •  Testosterone Cypionate (DEPOTESTOTERONE CYPIONATE) injection 300 mg, 300 mg, Intramuscular, Q14 Days, Angelita Lu MD, 300 mg at 03/01/17 1239    Assessment/Plan   Demi was seen today for rash.    Diagnoses and all orders for this visit:    Abnormal bruising  -     CBC & Differential  Closely monitor for bleeding or bruising.   Rash, skin  Avoid injuring area or scratching.   May use warm moist heat for discomfort or itching. Avoid overuse of medications and recommend watching and waiting.   Will check CBC.   Avoid changes with foods, medications, or household items    Follow up SCARLET Rehman, ZAKI  08/18/2017

## 2017-09-12 ENCOUNTER — TRANSCRIBE ORDERS (OUTPATIENT)
Dept: LAB | Facility: HOSPITAL | Age: 57
End: 2017-09-12

## 2017-09-12 ENCOUNTER — LAB (OUTPATIENT)
Dept: LAB | Facility: HOSPITAL | Age: 57
End: 2017-09-12
Attending: INTERNAL MEDICINE

## 2017-09-12 DIAGNOSIS — I38 ENDOCARDITIS, UNSPECIFIED CHRONICITY, UNSPECIFIED ENDOCARDITIS TYPE: ICD-10-CM

## 2017-09-12 DIAGNOSIS — D64.9 ANEMIA, UNSPECIFIED TYPE: Primary | ICD-10-CM

## 2017-09-12 DIAGNOSIS — R06.02 SOB (SHORTNESS OF BREATH): ICD-10-CM

## 2017-09-12 DIAGNOSIS — D64.9 ANEMIA, UNSPECIFIED TYPE: ICD-10-CM

## 2017-09-12 LAB
ALBUMIN SERPL-MCNC: 4.7 G/DL (ref 3.5–5)
ALBUMIN/GLOB SERPL: 1.5 G/DL (ref 1–2)
ALP SERPL-CCNC: 65 U/L (ref 38–126)
ALT SERPL W P-5'-P-CCNC: 50 U/L (ref 13–69)
ANION GAP SERPL CALCULATED.3IONS-SCNC: 15.8 MMOL/L
AST SERPL-CCNC: 26 U/L (ref 15–46)
BASOPHILS # BLD AUTO: 0.06 10*3/MM3 (ref 0–0.2)
BASOPHILS NFR BLD AUTO: 0.6 % (ref 0–2.5)
BILIRUB SERPL-MCNC: 0.3 MG/DL (ref 0.2–1.3)
BUN BLD-MCNC: 21 MG/DL (ref 7–20)
BUN/CREAT SERPL: 26.3 (ref 6.3–21.9)
CALCIUM SPEC-SCNC: 9.7 MG/DL (ref 8.4–10.2)
CHLORIDE SERPL-SCNC: 106 MMOL/L (ref 98–107)
CO2 SERPL-SCNC: 22 MMOL/L (ref 26–30)
CREAT BLD-MCNC: 0.8 MG/DL (ref 0.6–1.3)
DEPRECATED RDW RBC AUTO: 50.6 FL (ref 37–54)
EOSINOPHIL # BLD AUTO: 0.12 10*3/MM3 (ref 0–0.7)
EOSINOPHIL NFR BLD AUTO: 1.3 % (ref 0–7)
ERYTHROCYTE [DISTWIDTH] IN BLOOD BY AUTOMATED COUNT: 16.1 % (ref 11.5–14.5)
ERYTHROCYTE [SEDIMENTATION RATE] IN BLOOD: 10 MM/HR (ref 0–15)
GFR SERPL CREATININE-BSD FRML MDRD: 101 ML/MIN/1.73
GLOBULIN UR ELPH-MCNC: 3.1 GM/DL
GLUCOSE BLD-MCNC: 71 MG/DL (ref 74–98)
HCT VFR BLD AUTO: 46.3 % (ref 42–52)
HGB BLD-MCNC: 15.2 G/DL (ref 14–18)
HIGH SENSITIVE C-REACTIVE PROTEIN MG/L: 4.18 MG/L (ref 1–3)
IMM GRANULOCYTES # BLD: 0.06 10*3/MM3 (ref 0–0.06)
IMM GRANULOCYTES NFR BLD: 0.6 % (ref 0–0.6)
LYMPHOCYTES # BLD AUTO: 2.97 10*3/MM3 (ref 0.6–3.4)
LYMPHOCYTES NFR BLD AUTO: 31.3 % (ref 10–50)
MCH RBC QN AUTO: 28.3 PG (ref 27–31)
MCHC RBC AUTO-ENTMCNC: 32.8 G/DL (ref 30–37)
MCV RBC AUTO: 86.1 FL (ref 80–94)
MONOCYTES # BLD AUTO: 1.18 10*3/MM3 (ref 0–0.9)
MONOCYTES NFR BLD AUTO: 12.4 % (ref 0–12)
NEUTROPHILS # BLD AUTO: 5.09 10*3/MM3 (ref 2–6.9)
NEUTROPHILS NFR BLD AUTO: 53.8 % (ref 37–80)
NRBC BLD MANUAL-RTO: 0 /100 WBC (ref 0–0)
PLATELET # BLD AUTO: 266 10*3/MM3 (ref 130–400)
PMV BLD AUTO: 10.8 FL (ref 6–12)
POTASSIUM BLD-SCNC: 3.8 MMOL/L (ref 3.5–5.1)
PROT SERPL-MCNC: 7.8 G/DL (ref 6.3–8.2)
RBC # BLD AUTO: 5.38 10*6/MM3 (ref 4.7–6.1)
SODIUM BLD-SCNC: 140 MMOL/L (ref 137–145)
WBC NRBC COR # BLD: 9.48 10*3/MM3 (ref 4.8–10.8)

## 2017-09-12 PROCEDURE — 87081 CULTURE SCREEN ONLY: CPT | Performed by: INTERNAL MEDICINE

## 2017-09-12 PROCEDURE — 86141 C-REACTIVE PROTEIN HS: CPT | Performed by: INTERNAL MEDICINE

## 2017-09-12 PROCEDURE — 85651 RBC SED RATE NONAUTOMATED: CPT | Performed by: INTERNAL MEDICINE

## 2017-09-12 PROCEDURE — 36415 COLL VENOUS BLD VENIPUNCTURE: CPT

## 2017-09-12 PROCEDURE — 85025 COMPLETE CBC W/AUTO DIFF WBC: CPT | Performed by: INTERNAL MEDICINE

## 2017-09-12 PROCEDURE — 80053 COMPREHEN METABOLIC PANEL: CPT | Performed by: INTERNAL MEDICINE

## 2017-09-14 LAB — MRSA SPEC QL CULT: NORMAL

## 2017-09-18 ENCOUNTER — OFFICE VISIT (OUTPATIENT)
Dept: PULMONOLOGY | Facility: CLINIC | Age: 57
End: 2017-09-18

## 2017-09-18 VITALS
SYSTOLIC BLOOD PRESSURE: 119 MMHG | HEART RATE: 66 BPM | OXYGEN SATURATION: 95 % | BODY MASS INDEX: 32.21 KG/M2 | HEIGHT: 70 IN | WEIGHT: 225 LBS | RESPIRATION RATE: 16 BRPM | DIASTOLIC BLOOD PRESSURE: 82 MMHG

## 2017-09-18 DIAGNOSIS — R06.02 SHORTNESS OF BREATH: ICD-10-CM

## 2017-09-18 DIAGNOSIS — J45.40 MODERATE PERSISTENT ASTHMA WITHOUT COMPLICATION: ICD-10-CM

## 2017-09-18 DIAGNOSIS — R06.02 SHORTNESS OF BREATH: Primary | ICD-10-CM

## 2017-09-18 DIAGNOSIS — J30.89 OTHER ALLERGIC RHINITIS: ICD-10-CM

## 2017-09-18 PROCEDURE — 94060 EVALUATION OF WHEEZING: CPT | Performed by: INTERNAL MEDICINE

## 2017-09-18 PROCEDURE — 99214 OFFICE O/P EST MOD 30 MIN: CPT | Performed by: INTERNAL MEDICINE

## 2017-09-18 RX ORDER — CIPROFLOXACIN 250 MG/1
TABLET, FILM COATED ORAL
COMMUNITY
Start: 2017-09-11 | End: 2017-11-06

## 2017-09-18 NOTE — PROGRESS NOTES
"Chief Complaint   Patient presents with   • Follow-up     PFT   • Shortness of Breath         Subjective   Demi Meyer is a 56 y.o. male.     History of Present Illness   Patient comes in today to follow-up on asthma and allergic rhinitis.    The patient actually had a prolonged hospitalization for endocarditis and actually needed to undergo aortic valve replacement.    Since his discharge, he has noticed significant improvement in his symptoms.  He continues to use inhaled corticosteroid and has required rescue inhaler only once or twice a week or less.    He also reports improvement in symptoms of allergic rhinitis, although is using his nasal steroids once or twice a week?    The following portions of the patient's history were reviewed and updated as appropriate: allergies, current medications, past family history, past medical history, past social history and past surgical history.    Review of Systems   Constitutional: Positive for fatigue. Negative for chills and fever.   HENT: Positive for sneezing. Negative for sinus pressure and sore throat.    Respiratory: Positive for chest tightness and shortness of breath. Negative for cough and wheezing.    Cardiovascular: Positive for chest pain. Negative for palpitations and leg swelling.       Objective   Visit Vitals   • /82   • Pulse 66   • Resp 16   • Ht 70\" (177.8 cm)   • Wt 225 lb (102 kg)   • SpO2 95%   • BMI 32.28 kg/m2       Physical Exam   Constitutional: He is oriented to person, place, and time. He appears well-developed and well-nourished.   HENT:   Sinuses were non tender on palpation.  Nostril showed mild erythema.  Oropharynx was cobblestoned   Eyes: EOM are normal.   Neck: Neck supple. No JVD present.   Cardiovascular: Normal rate and regular rhythm.    Pulmonary/Chest: Effort normal and breath sounds normal.   Musculoskeletal:   Gait was normal.   Neurological: He is alert and oriented to person, place, and time.   Skin: Skin is warm and " dry.   Vitals reviewed.      Assessment/Plan   Demi was seen today for follow-up and shortness of breath.    Diagnoses and all orders for this visit:    Shortness of breath    Moderate persistent asthma without complication    Other allergic rhinitis         Return in about 6 months (around 3/18/2018) for Recheck.    DISCUSSION (if any):  I have reviewed the patient's last chest x-ray which showed bilateral infiltrates.  I will also reviewed echocardiogram that confirmed severe left atrial enlargement and moderate to severe aortic insufficiency. His discharge summary mentioned acute respiratory failure and endocarditis, along with the possibility of healthcare associated pneumonia.    Spirometry results were discussed with the patient.    ============================  ============================    His symptoms of moderate persistent asthma are under very good control and I made no changes.    Since his allergic rhinitis seems to be seasonal, I him to stop using Nasalide around Thanksgiving and to restart the nasal steroid only if he has recurrence of symptoms.      Dictated utilizing Dragon dictation.    This document was electronically signed by Carmen Butts MD September 18, 2017  11:16 AM

## 2017-09-20 RX ORDER — FLUNISOLIDE 0.25 MG/ML
SOLUTION NASAL
Qty: 25 ML | Refills: 5 | Status: SHIPPED | OUTPATIENT
Start: 2017-09-20 | End: 2018-05-17 | Stop reason: SDUPTHER

## 2017-10-23 RX ORDER — FLUTICASONE FUROATE 200 UG/1
POWDER RESPIRATORY (INHALATION)
Qty: 30 EACH | Refills: 5 | Status: SHIPPED | OUTPATIENT
Start: 2017-10-23 | End: 2018-03-29 | Stop reason: SDUPTHER

## 2017-11-06 ENCOUNTER — OFFICE VISIT (OUTPATIENT)
Dept: INTERNAL MEDICINE | Facility: CLINIC | Age: 57
End: 2017-11-06

## 2017-11-06 VITALS
SYSTOLIC BLOOD PRESSURE: 112 MMHG | HEART RATE: 72 BPM | BODY MASS INDEX: 32.35 KG/M2 | DIASTOLIC BLOOD PRESSURE: 80 MMHG | HEIGHT: 70 IN | WEIGHT: 226 LBS | OXYGEN SATURATION: 97 % | RESPIRATION RATE: 12 BRPM

## 2017-11-06 DIAGNOSIS — I10 ESSENTIAL HYPERTENSION: ICD-10-CM

## 2017-11-06 DIAGNOSIS — R79.89 LOW TESTOSTERONE: ICD-10-CM

## 2017-11-06 DIAGNOSIS — Z00.00 HEALTHCARE MAINTENANCE: ICD-10-CM

## 2017-11-06 DIAGNOSIS — M75.41 SUBACROMIAL IMPINGEMENT OF RIGHT SHOULDER: Primary | ICD-10-CM

## 2017-11-06 DIAGNOSIS — E78.00 HYPERCHOLESTEROLEMIA: ICD-10-CM

## 2017-11-06 DIAGNOSIS — R79.89 ELEVATED LFTS: ICD-10-CM

## 2017-11-06 PROCEDURE — 99214 OFFICE O/P EST MOD 30 MIN: CPT | Performed by: FAMILY MEDICINE

## 2017-11-06 RX ORDER — TIZANIDINE 4 MG/1
TABLET ORAL
Qty: 90 TABLET | Refills: 1 | Status: SHIPPED | OUTPATIENT
Start: 2017-11-06 | End: 2018-01-08 | Stop reason: SINTOL

## 2017-11-06 NOTE — PATIENT INSTRUCTIONS
mpingement Syndrome, Rotator Cuff, Bursitis With Rehab  Impingement syndrome is a condition that involves inflammation of the tendons of the rotator cuff and the subacromial bursa, that causes pain in the shoulder. The rotator cuff consists of four tendons and muscles that control much of the shoulder and upper arm function. The subacromial bursa is a fluid filled sac that helps reduce friction between the rotator cuff and one of the bones of the shoulder (acromion). Impingement syndrome is usually an overuse injury that causes swelling of the bursa (bursitis), swelling of the tendon (tendonitis), and/or a tear of the tendon (strain). Strains are classified into three categories. Grade 1 strains cause pain, but the tendon is not lengthened. Grade 2 strains include a lengthened ligament, due to the ligament being stretched or partially ruptured. With grade 2 strains there is still function, although the function may be decreased. Grade 3 strains include a complete tear of the tendon or muscle, and function is usually impaired.  SYMPTOMS   · Pain around the shoulder, often at the outer portion of the upper arm.  · Pain that gets worse with shoulder function, especially when reaching overhead or lifting.  · Sometimes, aching when not using the arm.  · Pain that wakes you up at night.  · Sometimes, tenderness, swelling, warmth, or redness over the affected area.  · Loss of strength.  · Limited motion of the shoulder, especially reaching behind the back (to the back pocket or to unhook bra) or across your body.  · Crackling sound (crepitation) when moving the arm.  · Biceps tendon pain and inflammation (in the front of the shoulder). Worse when bending the elbow or lifting.  CAUSES   Impingement syndrome is often an overuse injury, in which chronic (repetitive) motions cause the tendons or bursa to become inflamed. A strain occurs when a force is paced on the tendon or muscle that is greater than it can withstand. Common  mechanisms of injury include:  Stress from sudden increase in duration, frequency, or intensity of training.  · Direct hit (trauma) to the shoulder.  · Aging, erosion of the tendon with normal use.  · Bony bump on shoulder (acromial spur).  RISK INCREASES WITH:  · Contact sports (football, wrestling, boxing).  · Throwing sports (baseball, tennis, volleyball).  · Weightlifting and bodybuilding.  · Heavy labor.  · Previous injury to the rotator cuff, including impingement.  · Poor shoulder strength and flexibility.  · Failure to warm up properly before activity.  · Inadequate protective equipment.  · Old age.  · Bony bump on shoulder (acromial spur).  PREVENTION   · Warm up and stretch properly before activity.  · Allow for adequate recovery between workouts.  · Maintain physical fitness:    Strength, flexibility, and endurance.    Cardiovascular fitness.  · Learn and use proper exercise technique.  PROGNOSIS   If treated properly, impingement syndrome usually goes away within 6 weeks. Sometimes surgery is required.   RELATED COMPLICATIONS   · Longer healing time if not properly treated, or if not given enough time to heal.  · Recurring symptoms, that result in a chronic condition.  · Shoulder stiffness, frozen shoulder, or loss of motion.  · Rotator cuff tendon tear.  · Recurring symptoms, especially if activity is resumed too soon, with overuse, with a direct blow, or when using poor technique.  TREATMENT   Treatment first involves the use of ice and medicine, to reduce pain and inflammation. The use of strengthening and stretching exercises may help reduce pain with activity. These exercises may be performed at home or with a therapist. If non-surgical treatment is unsuccessful after more than 6 months, surgery may be advised. After surgery and rehabilitation, activity is usually possible in 3 months.   MEDICATION  · If pain medicine is needed, nonsteroidal anti-inflammatory medicines (aspirin and ibuprofen), or  other minor pain relievers (acetaminophen), are often advised.  · Do not take pain medicine for 7 days before surgery.  · Prescription pain relievers may be given, if your caregiver thinks they are needed. Use only as directed and only as much as you need.  · Corticosteroid injections may be given by your caregiver. These injections should be reserved for the most serious cases, because they may only be given a certain number of times.  HEAT AND COLD  · Cold treatment (icing) should be applied for 10 to 15 minutes every 2 to 3 hours for inflammation and pain, and immediately after activity that aggravates your symptoms. Use ice packs or an ice massage.  · Heat treatment may be used before performing stretching and strengthening activities prescribed by your caregiver, physical therapist, or . Use a heat pack or a warm water soak.  SEEK MEDICAL CARE IF:   · Symptoms get worse or do not improve in 4 to 6 weeks, despite treatment.  · New, unexplained symptoms develop. (Drugs used in treatment may produce side effects.)  EXERCISES   RANGE OF MOTION (ROM) AND STRETCHING EXERCISES - Impingement Syndrome (Rotator Cuff   Tendinitis, Bursitis)  These exercises may help you when beginning to rehabilitate your injury. Your symptoms may go away with or without further involvement from your physician, physical therapist or . While completing these exercises, remember:   · Restoring tissue flexibility helps normal motion to return to the joints. This allows healthier, less painful movement and activity.  · An effective stretch should be held for at least 30 seconds.  · A stretch should never be painful. You should only feel a gentle lengthening or release in the stretched tissue.  STRETCH - Flexion, Standing  · Stand with good posture. With an underhand  on your right / left hand, and an overhand  on the opposite hand, grasp a broomstick or cane so that your hands are a little more than  "shoulder width apart.  · Keeping your right / left elbow straight and shoulder muscles relaxed, push the stick with your opposite hand, to raise your right / left arm in front of your body and then overhead. Raise your arm until you feel a stretch in your right / left shoulder, but before you have increased shoulder pain.  · Try to avoid shrugging your right / left shoulder as your arm rises, by keeping your shoulder blade tucked down and toward your mid-back spine. Hold for 5-10 seconds.  · Slowly return to the starting position.  Repeat  3 times. Complete this exercise 2 times per day.  STRETCH - Abduction, Supine  · Lie on your back. With an underhand  on your right / left hand and an overhand  on the opposite hand, grasp a broomstick or cane so that your hands are a little more than shoulder width apart.  · Keeping your right / left elbow straight and your shoulder muscles relaxed, push the stick with your opposite hand, to raise your right / left arm out to the side of your body and then overhead. Raise your arm until you feel a stretch in your right / left shoulder, but before you have increased shoulder pain.  · Try to avoid shrugging your right / left shoulder as your arm rises, by keeping your shoulder blade tucked down and toward your mid-back spine. Hold for 5 to 10 seconds.  · Slowly return to the starting position.  Repeat 3-5  times. Complete this exercise 2 times per day.  ROM - Flexion, Active-Assisted  · Lie on your back. You may bend your knees for comfort.  · Grasp a broomstick or cane so your hands are about shoulder width apart. Your right / left hand should  the end of the stick, so that your hand is positioned \"thumbs-up,\" as if you were about to shake hands.  · Using your healthy arm to lead, raise your right / left arm overhead, until you feel a gentle stretch in your shoulder. Hold for 5-10 seconds.  · Use the stick to assist in returning your right / left arm to its starting " position.  Repeat 3-5  times. Complete this exercise 2 times per day.   ROM - Internal Rotation, Supine   · Lie on your back on a firm surface. Place your right / left elbow about 60 degrees away from your side. Elevate your elbow with a folded towel, so that the elbow and shoulder are the same height.  · Using a broomstick or cane and your strong arm, pull your right / left hand toward your body until you feel a gentle stretch, but no increase in your shoulder pain. Keep your shoulder and elbow in place throughout the exercise.  · Hold for 5-10 seconds. Slowly return to the starting position.  Repeat 3-5 times. Complete this exercise 2 times per day.  STRETCH - Internal Rotation  · Place your right / left hand behind your back, palm up.  · Throw a towel or belt over your opposite shoulder. Grasp the towel with your right / left hand.  · While keeping an upright posture, gently pull up on the towel, until you feel a stretch in the front of your right / left shoulder.  · Avoid shrugging your right / left shoulder as your arm rises, by keeping your shoulder blade tucked down and toward your mid-back spine.  · Hold for 5-10 seconds. Release the stretch, by lowering your healthy hand.  Repeat 3-5 times. Complete this exercise 2 times per day.  ROM - Internal Rotation   · Using an underhand , grasp a stick behind your back with both hands.  · While standing upright with good posture, slide the stick up your back until you feel a mild stretch in the front of your shoulder.  · Hold for 5-10 seconds. Slowly return to your starting position.  Repeat 3-5 times. Complete this exercise 2 times per day.   STRETCH - Posterior Shoulder Capsule   · Stand or sit with good posture. Grasp your right / left elbow and draw it across your chest, keeping it at the same height as your shoulder.  · Pull your elbow, so your upper arm comes in closer to your chest. Pull until you feel a gentle stretch in the back of your shoulder.  · Hold  for 5-10 seconds.  Repeat 3-5 times. Complete this exercise 2 times per day.    STRENGTHENING EXERCISES - Impingement Syndrome (Rotator Cuff Tendinitis, Bursitis)  These exercises may help you when beginning to rehabilitate your injury. They may resolve your symptoms with or without further involvement from your physician, physical therapist or . While completing these exercises, remember:  · Muscles can gain both the endurance and the strength needed for everyday activities through controlled exercises.  · Complete these exercises as instructed by your physician, physical therapist or . Increase the resistance and repetitions only as guided.  · You may experience muscle soreness or fatigue, but the pain or discomfort you are trying to eliminate should never worsen during these exercises. If this pain does get worse, stop and make sure you are following the directions exactly. If the pain is still present after adjustments, discontinue the exercise until you can discuss the trouble with your clinician.  · During your recovery, avoid activity or exercises which involve actions that place your injured hand or elbow above your head or behind your back or head. These positions stress the tissues which you are trying to heal.  STRENGTH - Scapular Depression and Adduction   · With good posture, sit on a firm chair. Support your arms in front of you, with pillows, arm rests, or on a table top. Have your elbows in line with the sides of your body.  · Gently draw your shoulder blades down and toward your mid-back spine. Gradually increase the tension, without tensing the muscles along the top of your shoulders and the back of your neck.  · Hold for 5-10 seconds. Slowly release the tension and relax your muscles completely before starting the next repetition.  · After you have practiced this exercise, remove the arm support and complete the exercise in standing as well as sitting  "position.  Repeat 3-5times. Complete this exercise 2 times per day.   STRENGTH - Shoulder Abductors, Isometric  · With good posture, stand or sit about 4-6 inches from a wall, with your right / left side facing the wall.  · Bend your right / left elbow. Gently press your right / left elbow into the wall. Increase the pressure gradually, until you are pressing as hard as you can, without shrugging your shoulder or increasing any shoulder discomfort.  · Hold for 5-10 seconds.  · Release the tension slowly. Relax your shoulder muscles completely before you begin the next repetition.  Repeat 3-5 times. Complete this exercise 2 times per day.   STRENGTH - External Rotators, Isometric  · Keep your right / left elbow at your side and bend it 90 degrees.  · Step into a door frame so that the outside of your right / left wrist can press against the door frame without your upper arm leaving your side.  · Gently press your right / left wrist into the door frame, as if you were trying to swing the back of your hand away from your stomach. Gradually increase the tension, until you are pressing as hard as you can, without shrugging your shoulder or increasing any shoulder discomfort.  · Hold for 5-10 seconds.  · Release the tension slowly. Relax your shoulder muscles completely before you begin the next repetition.  Repeat 3-5 times. Complete this exercise 2 times per day.   STRENGTH - Supraspinatus   · Stand or sit with good posture. Grasp a 1-3 pound weight, or an exercise band or tubing, so that your hand is \"thumbs-up,\" like you are shaking hands.  · Slowly lift your right / left arm in a \"V\" away from your thigh, diagonally into the space between your side and straight ahead. Lift your hand to shoulder height or as far as you can, without increasing any shoulder pain. At first, many people do not lift their hands above shoulder height.  · Avoid shrugging your right / left shoulder as your arm rises, by keeping your shoulder " blade tucked down and toward your mid-back spine.  · Hold for 5-10 seconds. Control the descent of your hand, as you slowly return to your starting position.  Repeat 3-5 times. Complete this exercise 2 times per day.   STRENGTH - External Rotators  · Secure a rubber exercise band or tubing to a fixed object (table, pole) so that it is at the same height as your right / left elbow when you are standing or sitting on a firm surface.  · Stand or sit so that the secured exercise band is at your uninjured side.  · Bend your right / left elbow 90 degrees. Place a folded towel or small pillow under your right / left arm, so that your elbow is a few inches away from your side.  · Keeping the tension on the exercise band, pull it away from your body, as if pivoting on your elbow. Be sure to keep your body steady, so that the movement is coming only from your rotating shoulder.  · Hold for 5-10 seconds. Release the tension in a controlled manner, as you return to the starting position.  Repeat 3-5 times. Complete this exercise 2 times per day.   STRENGTH - Internal Rotators   · Secure a rubber exercise band or tubing to a fixed object (table, pole) so that it is at the same height as your right / left elbow when you are standing or sitting on a firm surface.  · Stand or sit so that the secured exercise band is at your right / left side.  · Bend your elbow 90 degrees. Place a folded towel or small pillow under your right / left arm so that your elbow is a few inches away from your side.  · Keeping the tension on the exercise band, pull it across your body, toward your stomach. Be sure to keep your body steady, so that the movement is coming only from your rotating shoulder.  · Hold for 5-10 seconds. Release the tension in a controlled manner, as you return to the starting position.  Repeat 3-5 times. Complete this exercise 2 times per day.   STRENGTH - Scapular Protractors, Standing   · Stand arms length away from a wall.  Place your hands on the wall, keeping your elbows straight.  · Begin by dropping your shoulder blades down and toward your mid-back spine.  · To strengthen your protractors, keep your shoulder blades down, but slide them forward on your rib cage. It will feel as if you are lifting the back of your rib cage away from the wall. This is a subtle motion and can be challenging to complete. Ask your caregiver for further instruction, if you are not sure you are doing the exercise correctly.  · Hold for 5-10 seconds. Slowly return to the starting position, resting the muscles completely before starting the next repetition.  Repeat 3-5 times. Complete this exercise 2 times per day.  STRENGTH - Scapular Protractors, Supine  · Lie on your back on a firm surface. Extend your right / left arm straight into the air while holding a 1-3 pound weight in your hand.  · Keeping your head and back in place, lift your shoulder off the floor.  · Hold for 5-10 seconds. Slowly return to the starting position, and allow your muscles to relax completely before starting the next repetition.  Repeat 3-5 times. Complete this exercise 2 times per day.  STRENGTH - Scapular Protractors, Quadruped  · Get onto your hands and knees, with your shoulders directly over your hands (or as close as you can be, comfortably).  · Keeping your elbows locked, lift the back of your rib cage up into your shoulder blades, so your mid-back rounds out. Keep your neck muscles relaxed.  · Hold this position for 5-10 seconds. Slowly return to the starting position and allow your muscles to relax completely before starting the next repetition.  Repeat 3-5 times. Complete this exercise 2 times per day.   STRENGTH - Scapular Retractors  · Secure a rubber exercise band or tubing to a fixed object (table, pole), so that it is at the height of your shoulders when you are either standing, or sitting on a firm armless chair.  · With a palm down , grasp an end of the band  in each hand. Straighten your elbows and lift your hands straight in front of you, at shoulder height. Step back, away from the secured end of the band, until it becomes tense.  · Squeezing your shoulder blades together, draw your elbows back toward your sides, as you bend them. Keep your upper arms lifted away from your body throughout the exercise.  · Hold for 5-10 seconds. Slowly ease the tension on the band, as you reverse the directions and return to the starting position.  Repeat 3-5 times. Complete this exercise 2 times per day.  STRENGTH - Shoulder Extensors   · Secure a rubber exercise band or tubing to a fixed object (table, pole) so that it is at the height of your shoulders when you are either standing, or sitting on a firm armless chair.  · With a thumbs-up , grasp an end of the band in each hand. Straighten your elbows and lift your hands straight in front of you, at shoulder height. Step back, away from the secured end of the band, until it becomes tense.  · Squeezing your shoulder blades together, pull your hands down to the sides of your thighs. Do not allow your hands to go behind you.  · Hold for 5-10 seconds. Slowly ease the tension on the band, as you reverse the directions and return to the starting position.  Repeat 3-5 times. Complete this exercise 2 times per day.   STRENGTH - Scapular Retractors and External Rotators   · Secure a rubber exercise band or tubing to a fixed object (table, pole) so that it is at the height as your shoulders, when you are either standing, or sitting on a firm armless chair.  · With a palm down , grasp an end of the band in each hand. Bend your elbows 90 degrees and lift your elbows to shoulder height, at your sides. Step back, away from the secured end of the band, until it becomes tense.  · Squeezing your shoulder blades together, rotate your shoulders so that your upper arms and elbows remain stationary, but your fists travel upward to head  height.  · Hold for 5-10 seconds. Slowly ease the tension on the band, as you reverse the directions and return to the starting position.  Repeat 3-5 times. Complete this exercise 2 times per day.   STRENGTH - Scapular Retractors and External Rotators, Rowing   · Secure a rubber exercise band or tubing to a fixed object (table, pole) so that it is at the height of your shoulders, when you are either standing, or sitting on a firm armless chair.  · With a palm down , grasp an end of the band in each hand. Straighten your elbows and lift your hands straight in front of you, at shoulder height. Step back, away from the secured end of the band, until it becomes tense.  · Step 1: Squeeze your shoulder blades together. Bending your elbows, draw your hands to your chest, as if you are rowing a boat. At the end of this motion, your hands and elbow should be at shoulder height and your elbows should be out to your sides.  · Step 2: Rotate your shoulders, to raise your hands above your head. Your forearms should be vertical and your upper arms should be horizontal.  · Hold for 5-10 seconds. Slowly ease the tension on the band, as you reverse the directions and return to the starting position.  Repeat 3-5 times. Complete this exercise 2 times per day.   STRENGTH - Scapular Depressors  · Find a sturdy chair without wheels, such as a dining room chair.  · Keeping your feet on the floor, and your hands on the chair arms, lift your bottom up from the seat, and lock your elbows.  · Keeping your elbows straight, allow gravity to pull your body weight down. Your shoulders will rise toward your ears.  · Raise your body against gravity by drawing your shoulder blades down your back, shortening the distance between your shoulders and ears. Although your feet should always maintain contact with the floor, your feet should progressively support less body weight, as you get stronger.  · Hold for 5-10 seconds. In a controlled and slow  manner, lower your body weight to begin the next repetition.  Repeat 3-5 times. Complete this exercise 2 times per day.      This information is not intended to replace advice given to you by your health care provider. Make sure you discuss any questions you have with your health care provider.

## 2017-11-06 NOTE — PROGRESS NOTES
Right neck and shoulder pain    SUBJECTIVE: Demi Meyer is a 56 y.o. male seen for a follow up visit;    Shoulder Pain  Patient complains of right shoulder pain. The symptoms began a week ago. Aggravating factors: no known event. Pain is located between the neck and shoulder and around the acromioclavicular (AC) joint. Discomfort is described as aching and throbbing. Symptoms are exacerbated by overhead movements. Evaluation to date: none. Therapy to date includes: nothing specific.  Different than pain from last year, different in nature from during infection.  Also having neck pain along right side of neck.         Fatigue: feels pretty good when he gets up, but within about 15 minutes he feels tired and over-worked.        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  He has Essential hypertension; Hypogonadism in male; Mild intermittent asthma without complication; Paroxysmal atrial fibrillation; and Status post aortic valve replacement with bioprosthetic valve on his pertinent problem list.  He  has a past surgical history that includes Appendectomy; Eye surgery; Lumbar fusion (1999); Tonsillectomy; Hernia repair (2011); Back surgery; and Abdominal surgery.  His family history includes Alcohol abuse in his brother and maternal grandfather; Asthma in his sister; COPD in his maternal grandfather; Cancer in his brother, maternal grandfather, mother, and paternal grandmother; Diabetes in his father and mother; Esophageal cancer in his paternal grandmother; Gout in his other; Heart attack in his father; Hyperlipidemia in his other;  "Hypertension in his father, maternal grandfather, and mother; Irritable bowel syndrome in his father and sister; Lung cancer in his maternal grandfather; No Known Problems in his brother, maternal grandmother, maternal uncle, maternal uncle, and paternal aunt; Prostate cancer (age of onset: 61) in his brother; Rheumatic fever in his other; Stomach cancer in his maternal aunt.  He  reports that he quit smoking about 15 months ago. His smoking use included Cigarettes. He has a 5.00 pack-year smoking history. He has quit using smokeless tobacco. His smokeless tobacco use included Chew. He reports that he drinks about 0.6 oz of alcohol per week  He reports that he does not use illicit drugs.  He has a current medication list which includes the following prescription(s): amlodipine-benazepril, arnuity ellipta, aspirin, brimonidine-timolol, bupropion sr, carvedilol, cetirizine, diazepam, flunisolide, potassium chloride, spironolactone, sulfamethoxazole-trimethoprim, and tizanidine, and the following Facility-Administered Medications: testosterone cypionate..    Review of Systems   Constitutional: Positive for fatigue. Negative for chills and fever.        Tires more easily than he did prior to surgery   Respiratory: Negative for shortness of breath.    Cardiovascular: Negative.    Neurological: Negative for dizziness and light-headedness.         OBJECTIVE:  /80  Pulse 72  Resp 12  Ht 70\" (177.8 cm)  Wt 226 lb (103 kg)  SpO2 97%  BMI 32.43 kg/m2     Physical Exam   Constitutional: He appears well-developed and well-nourished. No distress.   Right Shoulder Exam     Tenderness   The patient is experiencing tenderness in the AC joint, acromion.    Range of Motion   Normal right shoulder ROM    Muscle Strength   Normal right shoulder strength    Tests   Drop Arm:        Negative    Comments:  Positive empty can (supraspinatus)  positive Maharaj (subacromial)  positive Cross arm (AC joint)  positive Lift-off " (subscapularis)  negative infraspinatus  negative teres minor                ASSESSMENT:   Diagnosis Plan   1. Subacromial impingement of right shoulder  tiZANidine (ZANAFLEX) 4 MG tablet   2. Healthcare maintenance  Comprehensive Metabolic Panel    Lipid Panel    Hemoglobin A1c    Vitamin D 25 Hydroxy    TSH    Ferritin    Iron Profile    Vitamin B12   3. Elevated LFTs  Comprehensive Metabolic Panel    CBC (No Diff)   4. Low testosterone  Testosterone   5. Essential hypertension  Comprehensive Metabolic Panel    Lipid Panel    Hemoglobin A1c   6. Hypercholesterolemia  Comprehensive Metabolic Panel    Lipid Panel     Physical therapy stretching and strengthening exercises given to patient and several demonstrated to patient.  Recommended twice daily exercises, if not improving will send to physical therapist.        Return in about 1 month (around 12/6/2017) for Annual.              I, Angelita Lu MD, hereby attest that the medical record entry above accurately reflects signatures/notations that I made in my capacity as Angelita Lu MD when I treated and diagnosed the above patient.  I do hereby attest that his information is true, accurate, and complete to the best of my knowledge and I understand that any falsification, omission, or concealment of material fact may subject me to administrative, civil, or criminal liability.

## 2017-11-07 LAB
25(OH)D3+25(OH)D2 SERPL-MCNC: 23.1 NG/ML
ALBUMIN SERPL-MCNC: 4.4 G/DL (ref 3.5–5)
ALBUMIN/GLOB SERPL: 1.6 G/DL (ref 1–2)
ALP SERPL-CCNC: 78 U/L (ref 38–126)
ALT SERPL-CCNC: 46 U/L (ref 13–69)
AST SERPL-CCNC: 32 U/L (ref 15–46)
BILIRUB SERPL-MCNC: 0.5 MG/DL (ref 0.2–1.3)
BUN SERPL-MCNC: 19 MG/DL (ref 7–20)
BUN/CREAT SERPL: 21.1 (ref 6.3–21.9)
CALCIUM SERPL-MCNC: 9.5 MG/DL (ref 8.4–10.2)
CHLORIDE SERPL-SCNC: 107 MMOL/L (ref 98–107)
CHOLEST SERPL-MCNC: 165 MG/DL (ref 0–199)
CO2 SERPL-SCNC: 22 MMOL/L (ref 26–30)
CREAT SERPL-MCNC: 0.9 MG/DL (ref 0.6–1.3)
ERYTHROCYTE [DISTWIDTH] IN BLOOD BY AUTOMATED COUNT: 14.9 % (ref 11.5–14.5)
FERRITIN SERPL-MCNC: 331 NG/ML (ref 17.9–464)
GFR SERPLBLD CREATININE-BSD FMLA CKD-EPI: 107 ML/MIN/1.73
GFR SERPLBLD CREATININE-BSD FMLA CKD-EPI: 88 ML/MIN/1.73
GLOBULIN SER CALC-MCNC: 2.8 GM/DL
GLUCOSE SERPL-MCNC: 123 MG/DL (ref 74–98)
HBA1C MFR BLD: 6 %
HCT VFR BLD AUTO: 43.3 % (ref 42–52)
HDLC SERPL-MCNC: 36 MG/DL (ref 40–60)
HGB BLD-MCNC: 14.6 G/DL (ref 14–18)
IRON SATN MFR SERPL: 29 % (ref 11–46)
IRON SERPL-MCNC: 104 MCG/DL (ref 37–181)
LDLC SERPL CALC-MCNC: 82 MG/DL (ref 0–99)
MCH RBC QN AUTO: 30.4 PG (ref 27–31)
MCHC RBC AUTO-ENTMCNC: 33.7 G/DL (ref 30–37)
MCV RBC AUTO: 90.2 FL (ref 80–94)
PLATELET # BLD AUTO: 221 10*3/MM3 (ref 130–400)
POTASSIUM SERPL-SCNC: 4.4 MMOL/L (ref 3.5–5.1)
PROT SERPL-MCNC: 7.2 G/DL (ref 6.3–8.2)
RBC # BLD AUTO: 4.8 10*6/MM3 (ref 4.7–6.1)
SODIUM SERPL-SCNC: 142 MMOL/L (ref 137–145)
TESTOST SERPL-MCNC: 274 NG/DL (ref 264–916)
TIBC SERPL-MCNC: 357 MCG/DL (ref 261–497)
TRIGL SERPL-MCNC: 237 MG/DL
TSH SERPL DL<=0.005 MIU/L-ACNC: 2.48 MIU/ML (ref 0.47–4.68)
UIBC SERPL-MCNC: 253 MCG/DL
VIT B12 SERPL-MCNC: 606 PG/ML (ref 239–931)
VLDLC SERPL CALC-MCNC: 47.4 MG/DL
WBC # BLD AUTO: 8.41 10*3/MM3 (ref 4.8–10.8)

## 2017-11-15 ENCOUNTER — TELEPHONE (OUTPATIENT)
Dept: INTERNAL MEDICINE | Facility: CLINIC | Age: 57
End: 2017-11-15

## 2017-11-16 RX ORDER — SULFAMETHOXAZOLE AND TRIMETHOPRIM 800; 160 MG/1; MG/1
1 TABLET ORAL 2 TIMES DAILY
Qty: 20 TABLET | Refills: 0 | Status: SHIPPED | OUTPATIENT
Start: 2017-11-16 | End: 2018-01-08

## 2017-11-30 DIAGNOSIS — I48.0 PAROXYSMAL ATRIAL FIBRILLATION (HCC): ICD-10-CM

## 2017-11-30 DIAGNOSIS — Z95.3 STATUS POST AORTIC VALVE REPLACEMENT WITH BIOPROSTHETIC VALVE: ICD-10-CM

## 2017-11-30 RX ORDER — POTASSIUM CHLORIDE 20 MEQ/1
TABLET, EXTENDED RELEASE ORAL
Qty: 60 TABLET | Refills: 1 | Status: SHIPPED | OUTPATIENT
Start: 2017-11-30 | End: 2018-01-08

## 2017-12-26 ENCOUNTER — TRANSCRIBE ORDERS (OUTPATIENT)
Dept: LAB | Facility: HOSPITAL | Age: 57
End: 2017-12-26

## 2017-12-26 ENCOUNTER — LAB (OUTPATIENT)
Dept: LAB | Facility: HOSPITAL | Age: 57
End: 2017-12-26
Attending: INTERNAL MEDICINE

## 2017-12-26 DIAGNOSIS — N18.1 CRF (CHRONIC RENAL FAILURE), STAGE 1: ICD-10-CM

## 2017-12-26 DIAGNOSIS — R07.9 CHEST PAIN, UNSPECIFIED TYPE: ICD-10-CM

## 2017-12-26 DIAGNOSIS — R07.9 CHEST PAIN, UNSPECIFIED TYPE: Primary | ICD-10-CM

## 2017-12-26 LAB
ALBUMIN SERPL-MCNC: 4.9 G/DL (ref 3.5–5)
ALBUMIN/GLOB SERPL: 1.6 G/DL (ref 1–2)
ALP SERPL-CCNC: 79 U/L (ref 38–126)
ALT SERPL W P-5'-P-CCNC: 41 U/L (ref 13–69)
ANION GAP SERPL CALCULATED.3IONS-SCNC: 14.2 MMOL/L
AST SERPL-CCNC: 26 U/L (ref 15–46)
BASOPHILS # BLD AUTO: 0.03 10*3/MM3 (ref 0–0.2)
BASOPHILS NFR BLD AUTO: 0.3 % (ref 0–2.5)
BILIRUB SERPL-MCNC: 0.4 MG/DL (ref 0.2–1.3)
BUN BLD-MCNC: 23 MG/DL (ref 7–20)
BUN/CREAT SERPL: 25.6 (ref 6.3–21.9)
CALCIUM SPEC-SCNC: 10.1 MG/DL (ref 8.4–10.2)
CHLORIDE SERPL-SCNC: 106 MMOL/L (ref 98–107)
CO2 SERPL-SCNC: 26 MMOL/L (ref 26–30)
CREAT BLD-MCNC: 0.9 MG/DL (ref 0.6–1.3)
CRP SERPL-MCNC: 0.7 MG/DL (ref 0–1)
DEPRECATED RDW RBC AUTO: 43.9 FL (ref 37–54)
EOSINOPHIL # BLD AUTO: 0.09 10*3/MM3 (ref 0–0.7)
EOSINOPHIL NFR BLD AUTO: 0.9 % (ref 0–7)
ERYTHROCYTE [DISTWIDTH] IN BLOOD BY AUTOMATED COUNT: 13.1 % (ref 11.5–14.5)
ERYTHROCYTE [SEDIMENTATION RATE] IN BLOOD: 10 MM/HR (ref 0–15)
GFR SERPL CREATININE-BSD FRML MDRD: 88 ML/MIN/1.73
GLOBULIN UR ELPH-MCNC: 3.1 GM/DL
GLUCOSE BLD-MCNC: 83 MG/DL (ref 74–98)
HCT VFR BLD AUTO: 43.5 % (ref 42–52)
HGB BLD-MCNC: 14.6 G/DL (ref 14–18)
IMM GRANULOCYTES # BLD: 0.09 10*3/MM3 (ref 0–0.06)
IMM GRANULOCYTES NFR BLD: 0.9 % (ref 0–0.6)
LYMPHOCYTES # BLD AUTO: 1.98 10*3/MM3 (ref 0.6–3.4)
LYMPHOCYTES NFR BLD AUTO: 20.7 % (ref 10–50)
MCH RBC QN AUTO: 30.8 PG (ref 27–31)
MCHC RBC AUTO-ENTMCNC: 33.6 G/DL (ref 30–37)
MCV RBC AUTO: 91.8 FL (ref 80–94)
MONOCYTES # BLD AUTO: 0.67 10*3/MM3 (ref 0–0.9)
MONOCYTES NFR BLD AUTO: 7 % (ref 0–12)
NEUTROPHILS # BLD AUTO: 6.71 10*3/MM3 (ref 2–6.9)
NEUTROPHILS NFR BLD AUTO: 70.2 % (ref 37–80)
NRBC BLD MANUAL-RTO: 0 /100 WBC (ref 0–0)
PLATELET # BLD AUTO: 238 10*3/MM3 (ref 130–400)
PMV BLD AUTO: 10.2 FL (ref 6–12)
POTASSIUM BLD-SCNC: 4.2 MMOL/L (ref 3.5–5.1)
PROT SERPL-MCNC: 8 G/DL (ref 6.3–8.2)
RBC # BLD AUTO: 4.74 10*6/MM3 (ref 4.7–6.1)
SODIUM BLD-SCNC: 142 MMOL/L (ref 137–145)
TROPONIN I SERPL-MCNC: <0.012 NG/ML (ref 0–0.03)
TSH SERPL DL<=0.05 MIU/L-ACNC: 3.04 MIU/ML (ref 0.47–4.68)
WBC NRBC COR # BLD: 9.57 10*3/MM3 (ref 4.8–10.8)

## 2017-12-26 PROCEDURE — 84484 ASSAY OF TROPONIN QUANT: CPT

## 2017-12-26 PROCEDURE — 36415 COLL VENOUS BLD VENIPUNCTURE: CPT

## 2017-12-26 PROCEDURE — 80053 COMPREHEN METABOLIC PANEL: CPT

## 2017-12-26 PROCEDURE — 86140 C-REACTIVE PROTEIN: CPT

## 2017-12-26 PROCEDURE — 85651 RBC SED RATE NONAUTOMATED: CPT

## 2017-12-26 PROCEDURE — 84443 ASSAY THYROID STIM HORMONE: CPT

## 2017-12-26 PROCEDURE — 85025 COMPLETE CBC W/AUTO DIFF WBC: CPT

## 2018-01-08 ENCOUNTER — OFFICE VISIT (OUTPATIENT)
Dept: INTERNAL MEDICINE | Facility: CLINIC | Age: 58
End: 2018-01-08

## 2018-01-08 VITALS
HEIGHT: 70 IN | WEIGHT: 228.4 LBS | OXYGEN SATURATION: 98 % | SYSTOLIC BLOOD PRESSURE: 122 MMHG | HEART RATE: 82 BPM | BODY MASS INDEX: 32.7 KG/M2 | DIASTOLIC BLOOD PRESSURE: 82 MMHG | RESPIRATION RATE: 12 BRPM

## 2018-01-08 DIAGNOSIS — J45.20 MILD INTERMITTENT ASTHMA WITHOUT COMPLICATION: ICD-10-CM

## 2018-01-08 DIAGNOSIS — I10 ESSENTIAL HYPERTENSION: ICD-10-CM

## 2018-01-08 DIAGNOSIS — F41.9 ANXIETY: ICD-10-CM

## 2018-01-08 DIAGNOSIS — G89.29 CHRONIC BILATERAL LOW BACK PAIN WITHOUT SCIATICA: ICD-10-CM

## 2018-01-08 DIAGNOSIS — E29.1 HYPOGONADISM IN MALE: ICD-10-CM

## 2018-01-08 DIAGNOSIS — Z95.3 STATUS POST AORTIC VALVE REPLACEMENT WITH BIOPROSTHETIC VALVE: Primary | ICD-10-CM

## 2018-01-08 DIAGNOSIS — M16.12 PRIMARY OSTEOARTHRITIS OF LEFT HIP: ICD-10-CM

## 2018-01-08 DIAGNOSIS — R11.0 NAUSEA: ICD-10-CM

## 2018-01-08 DIAGNOSIS — G89.4 CHRONIC PAIN SYNDROME: ICD-10-CM

## 2018-01-08 DIAGNOSIS — H40.2222 CHRONIC PRIMARY ANGLE-CLOSURE GLAUCOMA OF LEFT EYE, MODERATE STAGE: ICD-10-CM

## 2018-01-08 DIAGNOSIS — F41.8 DEPRESSION WITH ANXIETY: ICD-10-CM

## 2018-01-08 DIAGNOSIS — M54.50 CHRONIC BILATERAL LOW BACK PAIN WITHOUT SCIATICA: ICD-10-CM

## 2018-01-08 DIAGNOSIS — Z12.5 PROSTATE CANCER SCREENING: ICD-10-CM

## 2018-01-08 PROBLEM — I48.0 PAROXYSMAL ATRIAL FIBRILLATION (HCC): Status: RESOLVED | Noted: 2017-06-06 | Resolved: 2018-01-08

## 2018-01-08 PROBLEM — Z86.19 PERSONAL HISTORY OF SEPSIS: Status: RESOLVED | Noted: 2017-04-18 | Resolved: 2018-01-08

## 2018-01-08 PROBLEM — I05.9 ENDOCARDITIS OF MITRAL VALVE: Chronic | Status: RESOLVED | Noted: 2017-05-15 | Resolved: 2018-01-08

## 2018-01-08 PROBLEM — A49.02 MRSA INFECTION: Status: RESOLVED | Noted: 2017-05-15 | Resolved: 2018-01-08

## 2018-01-08 PROCEDURE — 99396 PREV VISIT EST AGE 40-64: CPT | Performed by: FAMILY MEDICINE

## 2018-01-08 RX ORDER — AMLODIPINE BESYLATE AND BENAZEPRIL HYDROCHLORIDE 5; 20 MG/1; MG/1
1 CAPSULE ORAL 2 TIMES DAILY
Qty: 60 CAPSULE | Refills: 5
Start: 2018-01-08

## 2018-01-08 RX ORDER — ALBUTEROL SULFATE 90 UG/1
2 AEROSOL, METERED RESPIRATORY (INHALATION) EVERY 6 HOURS PRN
Qty: 1 INHALER | Refills: 2 | Status: SHIPPED | OUTPATIENT
Start: 2018-01-08 | End: 2018-06-06 | Stop reason: SDUPTHER

## 2018-01-08 RX ORDER — BUPROPION HYDROCHLORIDE 150 MG/1
150 TABLET, EXTENDED RELEASE ORAL 2 TIMES DAILY
Qty: 60 TABLET | Refills: 2 | Status: SHIPPED | OUTPATIENT
Start: 2018-01-08

## 2018-01-08 RX ORDER — CARVEDILOL 12.5 MG/1
12.5 TABLET ORAL 2 TIMES DAILY WITH MEALS
Qty: 60 TABLET | Refills: 5
Start: 2018-01-08

## 2018-01-08 RX ORDER — ONDANSETRON 4 MG/1
4 TABLET, FILM COATED ORAL DAILY PRN
Qty: 30 TABLET | Refills: 2 | Status: SHIPPED | OUTPATIENT
Start: 2018-01-08

## 2018-01-08 RX ORDER — SPIRONOLACTONE 25 MG/1
25 TABLET ORAL DAILY
Qty: 30 TABLET | Refills: 5 | Status: SHIPPED | OUTPATIENT
Start: 2018-01-08 | End: 2018-07-12 | Stop reason: SDUPTHER

## 2018-01-08 RX ORDER — INFLUENZA A VIRUS A/SINGAPORE/GP1908/2015 IVR-180A (H1N1) ANTIGEN (PROPIOLACTONE INACTIVATED), INFLUENZA A VIRUS A/SINGAPORE/INFIMH-16-0019/2016 IVR-186 (H3N2) ANTIGEN (PROPIOLACTONE INACTIVATED) AND INFLUENZA B VIRUS B/MARYLAND/15/2016 ANTIGEN (PROPIOLACTONE INACTIVATED) 15; 15; 15 UG/.5ML; UG/.5ML; UG/.5ML
INJECTION, SUSPENSION INTRAMUSCULAR
COMMUNITY
Start: 2017-11-06 | End: 2018-01-08

## 2018-01-08 RX ORDER — AMLODIPINE BESYLATE AND BENAZEPRIL HYDROCHLORIDE 5; 20 MG/1; MG/1
CAPSULE ORAL
COMMUNITY
Start: 2017-12-26 | End: 2018-01-08 | Stop reason: SDUPTHER

## 2018-01-08 NOTE — PATIENT INSTRUCTIONS
"Food Choices to Lower Your Triglycerides  Triglycerides are a type of fat in your blood. High levels of triglycerides can increase the risk of heart disease and stroke. If your triglyceride levels are high, the foods you eat and your eating habits are very important. Choosing the right foods can help lower your triglycerides.   WHAT GENERAL GUIDELINES DO I NEED TO FOLLOW?  · Lose weight if you are overweight.    · Limit or avoid alcohol.    · Fill one half of your plate with vegetables and green salads.    · Limit fruit to two servings a day. Choose fruit instead of juice.    · Make one fourth of your plate whole grains. Look for the word \"whole\" as the first word in the ingredient list.  · Fill one fourth of your plate with lean protein foods.  · Enjoy fatty fish (such as salmon, mackerel, sardines, and tuna) three times a week.    · Choose healthy fats.    · Limit foods high in starch and sugar.  · Eat more home-cooked food and less restaurant, buffet, and fast food.  · Limit fried foods.  · Cook foods using methods other than frying.  · Limit saturated fats.  · Check ingredient lists to avoid foods with partially hydrogenated oils (trans fats) in them.  WHAT FOODS CAN I EAT?   Grains  Whole grains, such as whole wheat or whole grain breads, crackers, cereals, and pasta. Unsweetened oatmeal, bulgur, barley, quinoa, or brown rice. Corn or whole wheat flour tortillas.   Vegetables  Fresh or frozen vegetables (raw, steamed, roasted, or grilled). Green salads.  Fruits  All fresh, canned (in natural juice), or frozen fruits.  Meat and Other Protein Products  Ground beef (85% or leaner), grass-fed beef, or beef trimmed of fat. Skinless chicken or turkey. Ground chicken or turkey. Pork trimmed of fat. All fish and seafood. Eggs. Dried beans, peas, or lentils. Unsalted nuts or seeds. Unsalted canned or dry beans.  Dairy  Low-fat dairy products, such as skim or 1% milk, 2% or reduced-fat cheeses, low-fat ricotta or cottage " cheese, or plain low-fat yogurt.  Fats and Oils  Tub margarines without trans fats. Light or reduced-fat mayonnaise and salad dressings. Avocado. Safflower, olive, or canola oils. Natural peanut or almond butter.  The items listed above may not be a complete list of recommended foods or beverages. Contact your dietitian for more options.  WHAT FOODS ARE NOT RECOMMENDED?   Grains  White bread. White pasta. White rice. Cornbread. Bagels, pastries, and croissants. Crackers that contain trans fat.  Vegetables  White potatoes. Corn. Creamed or fried vegetables. Vegetables in a cheese sauce.  Fruits  Dried fruits. Canned fruit in light or heavy syrup. Fruit juice.  Meat and Other Protein Products  Fatty cuts of meat. Ribs, chicken wings, carbajal, sausage, bologna, salami, chitterlings, fatback, hot dogs, bratwurst, and packaged luncheon meats.  Dairy  Whole or 2% milk, cream, half-and-half, and cream cheese. Whole-fat or sweetened yogurt. Full-fat cheeses. Nondairy creamers and whipped toppings. Processed cheese, cheese spreads, or cheese curds.  Sweets and Desserts  Corn syrup, sugars, honey, and molasses. Candy. Jam and jelly. Syrup. Sweetened cereals. Cookies, pies, cakes, donuts, muffins, and ice cream.  Fats and Oils  Butter, stick margarine, lard, shortening, ghee, or carbajal fat. Coconut, palm kernel, or palm oils.  Beverages  Alcohol. Sweetened drinks (such as sodas, lemonade, and fruit drinks or punches).  The items listed above may not be a complete list of foods and beverages to avoid. Contact your dietitian for more information.     This information is not intended to replace advice given to you by your health care provider. Make sure you discuss any questions you have with your health care provider.     Document Released: 10/05/2005 Document Revised: 01/08/2016 Document Reviewed: 10/22/2014  [employer] Interactive Patient Education ©2016 [employer]

## 2018-01-08 NOTE — PROGRESS NOTES
Physical. He had been doing well, getting his steps in , had started walking daily. Dec 11th, he started feeling bad. Saw Dr. Parrish. He had been taking Carvedilol 1/2 tab BID instead of whole tab BID. He had been taking Amlodipine-Benazepril 10-40 at bedtime as directed. Now he is taking a 5-20 BID. The first day he started taking Carvedilol one tab BID, he started feeling a lot better, then started having cold sx's.   Would like rx for Zofran to have on hand if he needs it.   Discuss getting started back on testosterone     Subjective   Demi Meyer is a 57 y.o. male and is here for a comprehensive physical exam. The patient reports no problems.    Fatigue: concerned his testosterone is low, more irritability, fatigue, low libido.  Hasn't been on replacement since his surgery.  No ED.      Do you take any herbs or supplements that were not prescribed by a doctor? no  Are you taking calcium supplements? N/A  Are you taking aspirin daily? Yes     History:  Patient receives prostate care here: Yes  Date last prostate exam: 12/2016  Date last PSA: 12/2016  He reports No decrease in urinary stream,  No nocturia, No dribbling, No hesitancy.       reports that he currently engages in sexual activity and has had female partners.    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  He has Essential hypertension; Hypogonadism in male; Mild intermittent asthma without complication; and Status post  aortic valve replacement with bioprosthetic valve on his pertinent problem list.  He  has a past surgical history that includes Appendectomy; Eye surgery; Lumbar fusion (1999); Tonsillectomy; Hernia repair (2011); Back surgery; and Abdominal surgery.  His family history includes Alcohol abuse in his brother and maternal grandfather; Asthma in his sister; COPD in his maternal grandfather; Cancer in his brother, maternal grandfather, mother, and paternal grandmother; Diabetes in his father and mother; Esophageal cancer in his paternal grandmother; Gout in his other; Heart attack in his father; Hyperlipidemia in his other; Hypertension in his father, maternal grandfather, and mother; Irritable bowel syndrome in his father and sister; Lung cancer in his maternal grandfather; No Known Problems in his brother, maternal grandmother, maternal uncle, maternal uncle, and paternal aunt; Prostate cancer (age of onset: 61) in his brother; Rheumatic fever in his other; Stomach cancer in his maternal aunt.  He  reports that he quit smoking about 16 months ago. His smoking use included Cigarettes. He has a 5.00 pack-year smoking history. He has quit using smokeless tobacco. His smokeless tobacco use included Chew. He reports that he drinks about 0.6 oz of alcohol per week  He reports that he does not use illicit drugs.  Current Outpatient Prescriptions   Medication Sig Dispense Refill   • amLODIPine-benazepril (LOTREL 5-20) 5-20 MG per capsule Take 1 capsule by mouth 2 (Two) Times a Day. 60 capsule 5   • ARNUITY ELLIPTA 200 MCG/ACT aerosol powder  INHALE 1 PUFF DAILY. RINSE MOUTH WITH WATER AFTER USE. 30 each 5   • aspirin 81 MG EC tablet Take 81 mg by mouth Daily.     • Brinzolamide-Brimonidine (SIMBRINZA OP) Apply 1 drop to eye 2 (Two) Times a Day.     • Brinzolamide-Brimonidine 1-0.2 % suspension Apply  to eye. One drop each eye every 12 hours     • buPROPion SR (WELLBUTRIN SR) 150 MG 12 hr tablet Take 1 tablet by mouth 2 (Two)  "Times a Day. 60 tablet 2   • carvedilol (COREG) 12.5 MG tablet Take 1 tablet by mouth 2 (Two) Times a Day With Meals. 60 tablet 5   • cetirizine (zyrTEC) 10 MG tablet Take 10 mg by mouth Daily.     • flunisolide (NASALIDE) 25 MCG/ACT (0.025%) solution nasal spray INHALE 1 SPRAY EVERY 12 (TWELVE) HOURS. 25 mL 5   • spironolactone (ALDACTONE) 25 MG tablet Take 1 tablet by mouth Daily. 30 tablet 5   • albuterol (PROVENTIL HFA;VENTOLIN HFA) 108 (90 Base) MCG/ACT inhaler Inhale 2 puffs Every 6 (Six) Hours As Needed for Wheezing or Shortness of Air. 1 inhaler 2   • diazePAM (VALIUM) 5 MG tablet        No current facility-administered medications for this visit.    .    Review of Systems  Do you have pain that bothers you in your daily life? yes    Review of Systems   Constitutional: Positive for fatigue.   HENT: Positive for congestion, postnasal drip and rhinorrhea.    Eyes: Negative.    Respiratory: Positive for cough.    Cardiovascular: Negative.    Gastrointestinal: Negative.    Endocrine: Negative.    Genitourinary: Negative.    Musculoskeletal: Negative.    Skin: Negative.    Allergic/Immunologic: Negative.    Neurological: Negative.    Hematological: Negative.    Psychiatric/Behavioral: Positive for dysphoric mood.         Objective   /82  Pulse 82  Resp 12  Ht 177.8 cm (70\")  Wt 104 kg (228 lb 6.4 oz)  SpO2 98%  BMI 32.77 kg/m2    Physical Exam   Constitutional: He is oriented to person, place, and time. He appears well-developed and well-nourished. No distress.   HENT:   Head: Normocephalic and atraumatic.   Right Ear: Tympanic membrane, external ear and ear canal normal.   Left Ear: Tympanic membrane, external ear and ear canal normal.   Nose: No rhinorrhea.   Mouth/Throat: Uvula is midline, oropharynx is clear and moist and mucous membranes are normal. No posterior oropharyngeal erythema. No tonsillar exudate.   Eyes: Conjunctivae and lids are normal. Pupils are equal, round, and reactive to light. " Right eye exhibits no discharge. Left eye exhibits no discharge. No scleral icterus.   Neck: Trachea normal, normal range of motion and phonation normal. Neck supple. No thyroid mass and no thyromegaly present.   Cardiovascular: Normal rate, regular rhythm and normal heart sounds.    No murmur heard.  S1 click at LUSB   Pulmonary/Chest: Effort normal and breath sounds normal.   Abdominal: Soft. Normal appearance. There is no splenomegaly or hepatomegaly. There is no tenderness. No hernia.   Musculoskeletal: Normal range of motion. He exhibits no edema, tenderness or deformity.   Lymphadenopathy:        Head (right side): No submental, no submandibular, no preauricular and no posterior auricular adenopathy present.        Head (left side): No submental, no submandibular, no preauricular and no posterior auricular adenopathy present.     He has no cervical adenopathy.        Right: No supraclavicular adenopathy present.        Left: No supraclavicular adenopathy present.   Neurological: He is alert and oriented to person, place, and time. He has normal strength.   Reflex Scores:       Patellar reflexes are 2+ on the right side and 2+ on the left side.  Skin: Skin is warm and dry. No rash noted. No pallor.        Psychiatric: He has a normal mood and affect. His behavior is normal. Judgment and thought content normal.          Assessment/Plan   Healthy male exam.      1.    Diagnosis Plan   1. Status post aortic valve replacement with bioprosthetic valve  amLODIPine-benazepril (LOTREL 5-20) 5-20 MG per capsule    carvedilol (COREG) 12.5 MG tablet    spironolactone (ALDACTONE) 25 MG tablet   2. Essential hypertension  amLODIPine-benazepril (LOTREL 5-20) 5-20 MG per capsule    carvedilol (COREG) 12.5 MG tablet    spironolactone (ALDACTONE) 25 MG tablet   3. Mild intermittent asthma without complication  albuterol (PROVENTIL HFA;VENTOLIN HFA) 108 (90 Base) MCG/ACT inhaler   4. Chronic primary angle-closure glaucoma of  left eye, moderate stage  Brinzolamide-Brimonidine 1-0.2 % suspension   5. Depression with anxiety  buPROPion SR (WELLBUTRIN SR) 150 MG 12 hr tablet   6. Chronic pain syndrome     7. Chronic bilateral low back pain without sciatica     8. Primary osteoarthritis of left hip     9. Anxiety     10. Hypogonadism in male  Testosterone, Free, Total   11. Prostate cancer screening  PSA Screen       2. Patient Counseling:  --Nutrition: Stressed importance of moderation in sodium/caffeine intake, saturated fat and cholesterol, caloric balance, sufficient intake of fresh fruits, vegetables, fiber, calcium, iron,   --Discussed the issue of calcium supplement, and the daily use of baby aspirin if applicable.  --Exercise: Stressed the importance of regular exercise.   --Substance Abuse: Discussed cessation/primary prevention of tobacco (if applicable, alcohol, or other drug use (if applicable); driving or other dangerous activities under the influence; availability of treatment for abuse.    --Sexuality: Discussed sexually transmitted diseases, partner selection, use of condoms, avoidance of unintended pregnancy  and contraceptive alternatives.   --Injury prevention: Discussed safety belts, safety helmets, smoke detector, smoking near bedding or upholstery.   --Dental health: Discussed importance of regular tooth brushing, flossing, and dental visits.  --Immunizations reviewed.  --Discussed benefits of screening colonoscopy (if applicable).  --After hours service discussed with patient    3. Discussed the patient's BMI with him.  The BMI is above average; no BMI management plan is appropriate  4. Return in about 3 months (around 4/8/2018).      Will try clomid for testosterone replacement if affordable, if not resume biweekly shots.

## 2018-01-09 LAB
PSA SERPL-MCNC: 0.29 NG/ML (ref 0.06–4)
TESTOST FREE SERPL-MCNC: 7.5 PG/ML (ref 7.2–24)
TESTOST SERPL-MCNC: 345 NG/DL (ref 264–916)

## 2018-01-31 RX ORDER — TESTOSTERONE 12.5 MG/1.25G
GEL TOPICAL DAILY
Status: CANCELLED | OUTPATIENT
Start: 2018-01-31

## 2018-02-05 DIAGNOSIS — E29.1 HYPOGONADISM IN MALE: Primary | ICD-10-CM

## 2018-02-05 RX ORDER — TESTOSTERONE 16.2 MG/G
2 GEL TRANSDERMAL DAILY
Qty: 75 G | Refills: 1 | OUTPATIENT
Start: 2018-02-05 | End: 2018-05-25 | Stop reason: SDUPTHER

## 2018-02-15 ENCOUNTER — TELEPHONE (OUTPATIENT)
Dept: INTERNAL MEDICINE | Facility: CLINIC | Age: 58
End: 2018-02-15

## 2018-02-15 NOTE — TELEPHONE ENCOUNTER
Patient called stating that he is needing a PA on his androgel.    He states that he has been waiting on this for several days.

## 2018-02-28 NOTE — TELEPHONE ENCOUNTER
[facility] Appreals Line  Fax: [phone]    Patient states that he was told by [facility] that Dr. Lu can fax a letter of medical necessity for Androgel.

## 2018-02-28 NOTE — TELEPHONE ENCOUNTER
Patient is contacting the insurance company to see why that it took so long to get an answer to the PA request. Since this was denied (Per Ebony), he wants to know if there is anything else that Dr Lu recommends. He would like a return call.    [phone]

## 2018-03-07 ENCOUNTER — PRIOR AUTHORIZATION (OUTPATIENT)
Dept: INTERNAL MEDICINE | Facility: CLINIC | Age: 58
End: 2018-03-07

## 2018-03-07 ENCOUNTER — TELEPHONE (OUTPATIENT)
Dept: INTERNAL MEDICINE | Facility: CLINIC | Age: 58
End: 2018-03-07

## 2018-03-29 RX ORDER — FLUTICASONE FUROATE 200 UG/1
POWDER RESPIRATORY (INHALATION)
Qty: 30 EACH | Refills: 5 | Status: SHIPPED | OUTPATIENT
Start: 2018-03-29 | End: 2018-04-26

## 2018-04-26 RX ORDER — FLUTICASONE FUROATE 200 UG/1
POWDER RESPIRATORY (INHALATION)
Qty: 30 EACH | Refills: 5 | Status: SHIPPED | OUTPATIENT
Start: 2018-04-26 | End: 2018-06-06 | Stop reason: SDUPTHER

## 2018-05-18 RX ORDER — FLUNISOLIDE 0.25 MG/ML
SOLUTION NASAL
Qty: 25 ML | Refills: 5 | Status: SHIPPED | OUTPATIENT
Start: 2018-05-18 | End: 2018-12-10 | Stop reason: SDUPTHER

## 2018-05-25 DIAGNOSIS — E29.1 HYPOGONADISM IN MALE: ICD-10-CM

## 2018-05-25 RX ORDER — TESTOSTERONE 16.2 MG/G
2 GEL TRANSDERMAL DAILY
Qty: 75 G | Refills: 1 | Status: SHIPPED | OUTPATIENT
Start: 2018-05-25 | End: 2018-08-02 | Stop reason: SDUPTHER

## 2018-06-06 ENCOUNTER — OFFICE VISIT (OUTPATIENT)
Dept: PULMONOLOGY | Facility: CLINIC | Age: 58
End: 2018-06-06

## 2018-06-06 VITALS
OXYGEN SATURATION: 97 % | BODY MASS INDEX: 32.21 KG/M2 | DIASTOLIC BLOOD PRESSURE: 80 MMHG | HEIGHT: 70 IN | SYSTOLIC BLOOD PRESSURE: 124 MMHG | RESPIRATION RATE: 16 BRPM | HEART RATE: 61 BPM | WEIGHT: 225 LBS

## 2018-06-06 DIAGNOSIS — J30.89 OTHER ALLERGIC RHINITIS: ICD-10-CM

## 2018-06-06 DIAGNOSIS — R06.02 SHORTNESS OF BREATH: Primary | ICD-10-CM

## 2018-06-06 DIAGNOSIS — J45.40 MODERATE PERSISTENT ASTHMA WITHOUT COMPLICATION: ICD-10-CM

## 2018-06-06 PROCEDURE — 99214 OFFICE O/P EST MOD 30 MIN: CPT | Performed by: NURSE PRACTITIONER

## 2018-06-06 RX ORDER — ALBUTEROL SULFATE 90 UG/1
2 AEROSOL, METERED RESPIRATORY (INHALATION) EVERY 6 HOURS PRN
Qty: 1 INHALER | Refills: 2 | Status: SHIPPED | OUTPATIENT
Start: 2018-06-06 | End: 2018-06-06

## 2018-06-06 RX ORDER — ALBUTEROL SULFATE 90 UG/1
2 AEROSOL, METERED RESPIRATORY (INHALATION) EVERY 4 HOURS PRN
Qty: 1 INHALER | Refills: 5 | Status: SHIPPED | OUTPATIENT
Start: 2018-06-06

## 2018-06-06 NOTE — PROGRESS NOTES
"Chief Complaint   Patient presents with   • Follow-up   • Shortness of Breath         Subjective   Demi Meyer is a 57 y.o. male.     History of Present Illness   The patient comes in today for follow-up of shortness of breath, asthma, and allergic rhinitis.    He has been using Arnuity daily and has done well with this medication.  However he states the medication is expensive and he may need a different alternative.    He has not needed to use his rescue inhaler at all.    He reports that he has been sneezing some and coughing while doing yard work and other outdoor activities.  He does use Nasalide at least 1 time a day.    The following portions of the patient's history were reviewed and updated as appropriate: allergies, current medications, past family history, past medical history, past social history and past surgical history.    Review of Systems   HENT: Positive for sinus pressure. Negative for sneezing and sore throat.    Respiratory: Positive for shortness of breath. Negative for cough and wheezing.        Objective   Visit Vitals  /80   Pulse 61   Resp 16   Ht 177.8 cm (70\")   Wt 102 kg (225 lb)   SpO2 97%   BMI 32.28 kg/m²     Physical Exam   Constitutional: He is oriented to person, place, and time. He appears well-developed and well-nourished.   HENT:   Head: Normocephalic and atraumatic.   Crowded oropharynx.    Eyes: EOM are normal.   Pulmonary/Chest: Effort normal and breath sounds normal. No respiratory distress. He has no wheezes.   Musculoskeletal:   Gait was normal.   Neurological: He is alert and oriented to person, place, and time.   Psychiatric: He has a normal mood and affect.   Vitals reviewed.          Assessment/Plan   Demi was seen today for follow-up and shortness of breath.    Diagnoses and all orders for this visit:    Shortness of breath    Moderate persistent asthma without complication    Other allergic rhinitis    Mild intermittent asthma without complication  -     " albuterol (PROVENTIL HFA;VENTOLIN HFA) 108 (90 Base) MCG/ACT inhaler; Inhale 2 puffs Every 6 (Six) Hours As Needed for Wheezing or Shortness of Air.    Other orders  -     Fluticasone Furoate (ARNUITY ELLIPTA) 200 MCG/ACT aerosol powder ; Inhale 1 puff Daily.           Return in about 6 months (around 12/6/2018) for Recheck, For Dr. Butts.    DISCUSSION (if any):  Overall his asthma symptoms seem to be controlled with Arnuity.  Since he has commercial insurance I have given him a coupon for this medication.  I will try to keep him on this medication if possible due to increased compliance since it is only once a day and he has responded well to the medication and not needed the rescue inhaler since he began using it.    He definitely seems to have uncontrolled allergic rhinitis.  I have asked him to use Nasalide twice a day and continue cetirizine daily.  I have asked him to let me know if this sneezing or coughing worsens.  This seems to be triggered only when he is outside.  If the symptoms continue or worsen he will need an IgE/RAST possibly.  In reviewing his labs, and his absolute eosinophils have been as high as 120 in the past so this may need to be rechecked as well.    Dictated utilizing Dragon dictation.    This document was electronically signed by ZAKI Natarajan June 6, 2018  12:18 PM

## 2018-06-19 ENCOUNTER — OFFICE VISIT (OUTPATIENT)
Dept: INTERNAL MEDICINE | Facility: CLINIC | Age: 58
End: 2018-06-19

## 2018-06-19 VITALS
TEMPERATURE: 98.4 F | WEIGHT: 222 LBS | HEART RATE: 65 BPM | DIASTOLIC BLOOD PRESSURE: 70 MMHG | OXYGEN SATURATION: 98 % | BODY MASS INDEX: 31.78 KG/M2 | SYSTOLIC BLOOD PRESSURE: 110 MMHG | HEIGHT: 70 IN

## 2018-06-19 DIAGNOSIS — K52.9 GASTROENTERITIS, ACUTE: ICD-10-CM

## 2018-06-19 DIAGNOSIS — R50.9 FEVER, UNSPECIFIED FEVER CAUSE: Primary | ICD-10-CM

## 2018-06-19 DIAGNOSIS — E29.1 HYPOGONADISM IN MALE: ICD-10-CM

## 2018-06-19 DIAGNOSIS — R30.0 DYSURIA: ICD-10-CM

## 2018-06-19 DIAGNOSIS — E03.8 SUBCLINICAL HYPOTHYROIDISM: ICD-10-CM

## 2018-06-19 LAB
BILIRUB BLD-MCNC: ABNORMAL MG/DL
CLARITY, POC: CLEAR
COLOR UR: ABNORMAL
GLUCOSE UR STRIP-MCNC: NEGATIVE MG/DL
KETONES UR QL: NEGATIVE
LEUKOCYTE EST, POC: ABNORMAL
NITRITE UR-MCNC: NEGATIVE MG/ML
PH UR: 5 [PH] (ref 5–8)
PROT UR STRIP-MCNC: ABNORMAL MG/DL
RBC # UR STRIP: NEGATIVE /UL
SP GR UR: 1.02 (ref 1–1.03)
UROBILINOGEN UR QL: NORMAL

## 2018-06-19 PROCEDURE — 99214 OFFICE O/P EST MOD 30 MIN: CPT | Performed by: FAMILY MEDICINE

## 2018-06-19 PROCEDURE — 81003 URINALYSIS AUTO W/O SCOPE: CPT | Performed by: FAMILY MEDICINE

## 2018-06-21 LAB
BACTERIA UR CULT: NORMAL
BACTERIA UR CULT: NORMAL

## 2018-06-30 LAB
ALBUMIN SERPL-MCNC: 4.4 G/DL (ref 3.5–5)
ALBUMIN/GLOB SERPL: 1.6 G/DL (ref 1–2)
ALP SERPL-CCNC: 82 U/L (ref 38–126)
ALT SERPL-CCNC: 25 U/L (ref 13–69)
AST SERPL-CCNC: 22 U/L (ref 15–46)
BILIRUB SERPL-MCNC: 0.5 MG/DL (ref 0.2–1.3)
BUN SERPL-MCNC: 21 MG/DL (ref 7–20)
BUN/CREAT SERPL: 23.3 (ref 6.3–21.9)
CALCIUM SERPL-MCNC: 9.8 MG/DL (ref 8.4–10.2)
CHLORIDE SERPL-SCNC: 103 MMOL/L (ref 98–107)
CO2 SERPL-SCNC: 21 MMOL/L (ref 26–30)
CREAT SERPL-MCNC: 0.9 MG/DL (ref 0.6–1.3)
ERYTHROCYTE [DISTWIDTH] IN BLOOD BY AUTOMATED COUNT: 13.5 % (ref 11.5–14.5)
GLOBULIN SER CALC-MCNC: 2.8 GM/DL
GLUCOSE SERPL-MCNC: 114 MG/DL (ref 74–98)
HCT VFR BLD AUTO: 44.1 % (ref 42–52)
HGB BLD-MCNC: 14.7 G/DL (ref 14–18)
MCH RBC QN AUTO: 30.1 PG (ref 27–31)
MCHC RBC AUTO-ENTMCNC: 33.3 G/DL (ref 30–37)
MCV RBC AUTO: 90.4 FL (ref 80–94)
PLATELET # BLD AUTO: 336 10*3/MM3 (ref 130–400)
POTASSIUM SERPL-SCNC: 4.8 MMOL/L (ref 3.5–5.1)
PROT SERPL-MCNC: 7.2 G/DL (ref 6.3–8.2)
RBC # BLD AUTO: 4.88 10*6/MM3 (ref 4.7–6.1)
SODIUM SERPL-SCNC: 138 MMOL/L (ref 137–145)
T3 SERPL-MCNC: 114 NG/DL (ref 71–180)
T4 FREE SERPL-MCNC: 1.15 NG/DL (ref 0.78–2.19)
TESTOST FREE SERPL-MCNC: 9.9 PG/ML (ref 7.2–24)
TESTOST SERPL-MCNC: 369 NG/DL (ref 264–916)
TSH SERPL DL<=0.005 MIU/L-ACNC: 3.17 MIU/ML (ref 0.47–4.68)
WBC # BLD AUTO: 11.4 10*3/MM3 (ref 4.8–10.8)

## 2018-07-12 DIAGNOSIS — I10 ESSENTIAL HYPERTENSION: ICD-10-CM

## 2018-07-12 DIAGNOSIS — Z95.3 STATUS POST AORTIC VALVE REPLACEMENT WITH BIOPROSTHETIC VALVE: ICD-10-CM

## 2018-07-12 RX ORDER — SPIRONOLACTONE 25 MG/1
TABLET ORAL
Qty: 30 TABLET | Refills: 5 | Status: SHIPPED | OUTPATIENT
Start: 2018-07-12

## 2018-08-02 DIAGNOSIS — E29.1 HYPOGONADISM IN MALE: ICD-10-CM

## 2018-08-02 RX ORDER — TESTOSTERONE 16.2 MG/G
2 GEL TRANSDERMAL DAILY
Qty: 75 G | Refills: 1 | Status: SHIPPED | OUTPATIENT
Start: 2018-08-02

## 2018-11-30 ENCOUNTER — TRANSCRIBE ORDERS (OUTPATIENT)
Dept: ADMINISTRATIVE | Facility: HOSPITAL | Age: 58
End: 2018-11-30

## 2018-11-30 ENCOUNTER — LAB (OUTPATIENT)
Dept: LAB | Facility: HOSPITAL | Age: 58
End: 2018-11-30

## 2018-11-30 ENCOUNTER — HOSPITAL ENCOUNTER (EMERGENCY)
Facility: HOSPITAL | Age: 58
End: 2018-11-30

## 2018-11-30 DIAGNOSIS — I10 ESSENTIAL HYPERTENSION, MALIGNANT: Primary | ICD-10-CM

## 2018-11-30 DIAGNOSIS — E29.1 3-OXO-5 ALPHA-STEROID DELTA 4-DEHYDROGENASE DEFICIENCY: ICD-10-CM

## 2018-11-30 DIAGNOSIS — E55.9 VITAMIN D DEFICIENCY: ICD-10-CM

## 2018-11-30 LAB
25(OH)D3 SERPL-MCNC: 46.7 NG/ML
ALBUMIN SERPL-MCNC: 5.1 G/DL (ref 3.5–5)
ALBUMIN/GLOB SERPL: 1.5 G/DL (ref 1–2)
ALP SERPL-CCNC: 74 U/L (ref 38–126)
ALT SERPL W P-5'-P-CCNC: 36 U/L (ref 13–69)
ANION GAP SERPL CALCULATED.3IONS-SCNC: 14.5 MMOL/L (ref 10–20)
AST SERPL-CCNC: 28 U/L (ref 15–46)
BASOPHILS # BLD AUTO: 0.06 10*3/MM3 (ref 0–0.2)
BASOPHILS NFR BLD AUTO: 0.6 % (ref 0–2.5)
BILIRUB SERPL-MCNC: 0.8 MG/DL (ref 0.2–1.3)
BUN BLD-MCNC: 19 MG/DL (ref 7–20)
BUN/CREAT SERPL: 15.8 (ref 6.3–21.9)
CALCIUM SPEC-SCNC: 9.5 MG/DL (ref 8.4–10.2)
CHLORIDE SERPL-SCNC: 103 MMOL/L (ref 98–107)
CO2 SERPL-SCNC: 26 MMOL/L (ref 26–30)
CREAT BLD-MCNC: 1.2 MG/DL (ref 0.6–1.3)
DEPRECATED RDW RBC AUTO: 45.2 FL (ref 37–54)
EOSINOPHIL # BLD AUTO: 0.11 10*3/MM3 (ref 0–0.7)
EOSINOPHIL NFR BLD AUTO: 1.2 % (ref 0–7)
ERYTHROCYTE [DISTWIDTH] IN BLOOD BY AUTOMATED COUNT: 13.7 % (ref 11.5–14.5)
GFR SERPL CREATININE-BSD FRML MDRD: 63 ML/MIN/1.73
GLOBULIN UR ELPH-MCNC: 3.5 GM/DL
GLUCOSE BLD-MCNC: 136 MG/DL (ref 74–98)
HCT VFR BLD AUTO: 49.8 % (ref 42–52)
HGB BLD-MCNC: 16.5 G/DL (ref 14–18)
IMM GRANULOCYTES # BLD: 0.05 10*3/MM3 (ref 0–0.06)
IMM GRANULOCYTES NFR BLD: 0.5 % (ref 0–0.6)
LYMPHOCYTES # BLD AUTO: 1.99 10*3/MM3 (ref 0.6–3.4)
LYMPHOCYTES NFR BLD AUTO: 21.4 % (ref 10–50)
MCH RBC QN AUTO: 29.8 PG (ref 27–31)
MCHC RBC AUTO-ENTMCNC: 33.1 G/DL (ref 30–37)
MCV RBC AUTO: 90.1 FL (ref 80–94)
MONOCYTES # BLD AUTO: 0.7 10*3/MM3 (ref 0–0.9)
MONOCYTES NFR BLD AUTO: 7.5 % (ref 0–12)
NEUTROPHILS # BLD AUTO: 6.4 10*3/MM3 (ref 2–6.9)
NEUTROPHILS NFR BLD AUTO: 68.8 % (ref 37–80)
NRBC BLD MANUAL-RTO: 0 /100 WBC (ref 0–0)
PLATELET # BLD AUTO: 296 10*3/MM3 (ref 130–400)
PMV BLD AUTO: 10.5 FL (ref 6–12)
POTASSIUM BLD-SCNC: 4.5 MMOL/L (ref 3.5–5.1)
PROT SERPL-MCNC: 8.6 G/DL (ref 6.3–8.2)
RBC # BLD AUTO: 5.53 10*6/MM3 (ref 4.7–6.1)
SODIUM BLD-SCNC: 139 MMOL/L (ref 137–145)
WBC NRBC COR # BLD: 9.31 10*3/MM3 (ref 4.8–10.8)

## 2018-11-30 PROCEDURE — 80053 COMPREHEN METABOLIC PANEL: CPT | Performed by: FAMILY MEDICINE

## 2018-11-30 PROCEDURE — 84402 ASSAY OF FREE TESTOSTERONE: CPT

## 2018-11-30 PROCEDURE — 82306 VITAMIN D 25 HYDROXY: CPT | Performed by: FAMILY MEDICINE

## 2018-11-30 PROCEDURE — 36415 COLL VENOUS BLD VENIPUNCTURE: CPT | Performed by: FAMILY MEDICINE

## 2018-11-30 PROCEDURE — 85025 COMPLETE CBC W/AUTO DIFF WBC: CPT | Performed by: FAMILY MEDICINE

## 2018-11-30 PROCEDURE — 82670 ASSAY OF TOTAL ESTRADIOL: CPT

## 2018-11-30 PROCEDURE — 84403 ASSAY OF TOTAL TESTOSTERONE: CPT

## 2018-12-01 LAB
ESTRADIOL SERPL HS-MCNC: 37 PG/ML (ref 7.6–42.6)
TESTOST FREE SERPL-MCNC: 14.4 PG/ML (ref 7.2–24)
TESTOST SERPL-MCNC: 532 NG/DL (ref 264–916)

## 2018-12-10 ENCOUNTER — OFFICE VISIT (OUTPATIENT)
Dept: PULMONOLOGY | Facility: CLINIC | Age: 58
End: 2018-12-10

## 2018-12-10 VITALS
OXYGEN SATURATION: 98 % | DIASTOLIC BLOOD PRESSURE: 78 MMHG | BODY MASS INDEX: 32.35 KG/M2 | HEART RATE: 68 BPM | RESPIRATION RATE: 17 BRPM | WEIGHT: 226 LBS | SYSTOLIC BLOOD PRESSURE: 110 MMHG | HEIGHT: 70 IN

## 2018-12-10 DIAGNOSIS — J30.89 OTHER ALLERGIC RHINITIS: ICD-10-CM

## 2018-12-10 DIAGNOSIS — J45.30 MILD PERSISTENT ASTHMA WITHOUT COMPLICATION: ICD-10-CM

## 2018-12-10 DIAGNOSIS — R06.02 SHORTNESS OF BREATH: Primary | ICD-10-CM

## 2018-12-10 DIAGNOSIS — E66.9 OBESITY (BMI 30-39.9): ICD-10-CM

## 2018-12-10 PROCEDURE — 95012 NITRIC OXIDE EXP GAS DETER: CPT | Performed by: INTERNAL MEDICINE

## 2018-12-10 PROCEDURE — 99214 OFFICE O/P EST MOD 30 MIN: CPT | Performed by: INTERNAL MEDICINE

## 2018-12-10 RX ORDER — FLUNISOLIDE 0.25 MG/ML
1 SOLUTION NASAL EVERY 12 HOURS
Qty: 1 BOTTLE | Refills: 11 | Status: SHIPPED | OUTPATIENT
Start: 2018-12-10

## 2018-12-10 RX ORDER — MONTELUKAST SODIUM 10 MG/1
10 TABLET ORAL NIGHTLY
Qty: 30 TABLET | Refills: 5 | Status: SHIPPED | OUTPATIENT
Start: 2018-12-10

## 2018-12-10 NOTE — PROGRESS NOTES
"Chief Complaint   Patient presents with   • Follow-up   • Shortness of Breath         Subjective   Demi Meyer is a 58 y.o. male.     History of Present Illness   Patient comes in today for follow up of asthma, allergic rhinitis and shortness of breath. Patient does not report any recent exacerbations requiring emergency room visits or hospitalizations.    Patient is using the rescue inhalers minimally. he is compliant with pulmonary medicines, as prescribed.    His symptoms of allergic rhinitis are under decent control with nasal sprays.    The following portions of the patient's history were reviewed and updated as appropriate: allergies, current medications, past family history, past medical history, past social history and past surgical history.    Review of Systems   Constitutional: Negative for chills and fever.   HENT: Negative for sinus pressure and sore throat.    Respiratory: Positive for shortness of breath. Negative for cough and wheezing.    Psychiatric/Behavioral: Negative for sleep disturbance.       Objective   Visit Vitals  /78   Pulse 68   Resp 17   Ht 177.8 cm (70\")   Wt 103 kg (226 lb)   SpO2 98%   BMI 32.43 kg/m²       Physical Exam   Constitutional: He is oriented to person, place, and time. He appears well-developed and well-nourished.   HENT:   Nostril showed no erythema.  Oropharynx was clear.    Eyes: EOM are normal.   Neck: Neck supple. No JVD present.   Cardiovascular: Normal rate and regular rhythm.   Pulmonary/Chest: Effort normal and breath sounds normal.   Musculoskeletal:   Gait was normal.   Neurological: He is alert and oriented to person, place, and time.   Skin: Skin is warm and dry.   Vitals reviewed.        Assessment/Plan   Demi was seen today for follow-up and shortness of breath.    Diagnoses and all orders for this visit:    Shortness of breath  -     Nitric Oxide  -     Peak Flow    Other allergic rhinitis    Mild persistent asthma without complication  -     " Nitric Oxide  -     Peak Flow    Obesity (BMI 30-39.9)    Other orders  -     montelukast (SINGULAIR) 10 MG tablet; Take 1 tablet by mouth Every Night.  -     flunisolide (NASALIDE) 25 MCG/ACT (0.025%) solution nasal spray; Inhale 1 spray Every 12 (Twelve) Hours.           Return in about 7 months (around 7/10/2019) for Recheck, For [place].    DISCUSSION (if any):  Patient's symptoms seem to be under good control with the current regimen.    I have made changes to the medications and discontinued Arnuity.     Will start Singulair 10 mg PO QHS.    Discussed with the patient in great detail the need for compliance with medications.    Side effects of prescribed medications discussed with the patient.    I have discussed asthma action plan with him.    The patient was asked to call this office if the symptoms worsen.    Continue Nasalide as prescribed.     FeNO level was 22 today.    Peak flow was 650 LPM today.      Dictated utilizing Dragon dictation.    This document was electronically signed by Carmen Butts MD December 10, 2018  10:56 AM

## 2023-10-13 NOTE — TELEPHONE ENCOUNTER
Called pt to see how he is feeling since d/c from [facility]. Got a fast busy signal when I called.    Sotyktu Counseling:  I discussed the most common side effects of Sotyktu including: common cold, sore throat, sinus infections, cold sores, canker sores, folliculitis, and acne.? I also discussed more serious side effects of Sotyktu including but not limited to: serious allergic reactions; increased risk for infections such as TB; cancers such as lymphomas; rhabdomyolysis and elevated CPK; and elevated triglycerides and liver enzymes.?